# Patient Record
Sex: FEMALE | Race: WHITE | NOT HISPANIC OR LATINO
[De-identification: names, ages, dates, MRNs, and addresses within clinical notes are randomized per-mention and may not be internally consistent; named-entity substitution may affect disease eponyms.]

---

## 2017-01-09 ENCOUNTER — APPOINTMENT (OUTPATIENT)
Dept: CV DIAGNOSITCS | Facility: HOSPITAL | Age: 77
End: 2017-01-09

## 2017-01-10 ENCOUNTER — OUTPATIENT (OUTPATIENT)
Dept: OUTPATIENT SERVICES | Facility: HOSPITAL | Age: 77
LOS: 1 days | End: 2017-01-10
Payer: COMMERCIAL

## 2017-01-10 ENCOUNTER — APPOINTMENT (OUTPATIENT)
Dept: CV DIAGNOSITCS | Facility: HOSPITAL | Age: 77
End: 2017-01-10

## 2017-01-10 DIAGNOSIS — I05.9 RHEUMATIC MITRAL VALVE DISEASE, UNSPECIFIED: ICD-10-CM

## 2017-01-10 PROCEDURE — 93306 TTE W/DOPPLER COMPLETE: CPT | Mod: 26

## 2017-01-10 PROCEDURE — 93306 TTE W/DOPPLER COMPLETE: CPT

## 2017-01-10 PROCEDURE — 93312 ECHO TRANSESOPHAGEAL: CPT | Mod: 26

## 2017-01-10 PROCEDURE — 93312 ECHO TRANSESOPHAGEAL: CPT

## 2017-02-01 ENCOUNTER — RECORD ABSTRACTING (OUTPATIENT)
Age: 77
End: 2017-02-01

## 2017-02-01 DIAGNOSIS — Z86.79 PERSONAL HISTORY OF OTHER DISEASES OF THE CIRCULATORY SYSTEM: ICD-10-CM

## 2017-02-01 DIAGNOSIS — R53.83 OTHER FATIGUE: ICD-10-CM

## 2017-02-07 ENCOUNTER — APPOINTMENT (OUTPATIENT)
Dept: CARDIOTHORACIC SURGERY | Facility: CLINIC | Age: 77
End: 2017-02-07

## 2017-02-07 VITALS
BODY MASS INDEX: 21.66 KG/M2 | SYSTOLIC BLOOD PRESSURE: 146 MMHG | HEART RATE: 90 BPM | HEIGHT: 65 IN | DIASTOLIC BLOOD PRESSURE: 74 MMHG | RESPIRATION RATE: 16 BRPM | OXYGEN SATURATION: 98 % | WEIGHT: 130 LBS

## 2017-02-07 RX ORDER — FLUOCINONIDE 0.05 %
0.05 GEL (GRAM) TOPICAL
Refills: 0 | Status: DISCONTINUED | COMMUNITY
End: 2017-02-07

## 2017-07-07 ENCOUNTER — APPOINTMENT (OUTPATIENT)
Dept: CARDIOTHORACIC SURGERY | Facility: CLINIC | Age: 77
End: 2017-07-07

## 2017-07-07 VITALS
RESPIRATION RATE: 16 BRPM | OXYGEN SATURATION: 97 % | SYSTOLIC BLOOD PRESSURE: 134 MMHG | WEIGHT: 130 LBS | DIASTOLIC BLOOD PRESSURE: 76 MMHG | BODY MASS INDEX: 21.66 KG/M2 | HEIGHT: 65 IN | HEART RATE: 88 BPM

## 2017-07-20 ENCOUNTER — OUTPATIENT (OUTPATIENT)
Dept: OUTPATIENT SERVICES | Facility: HOSPITAL | Age: 77
LOS: 1 days | End: 2017-07-20
Payer: COMMERCIAL

## 2017-07-20 VITALS
TEMPERATURE: 98 F | HEIGHT: 65 IN | OXYGEN SATURATION: 100 % | DIASTOLIC BLOOD PRESSURE: 76 MMHG | WEIGHT: 111.99 LBS | HEART RATE: 81 BPM | SYSTOLIC BLOOD PRESSURE: 158 MMHG | RESPIRATION RATE: 16 BRPM

## 2017-07-20 DIAGNOSIS — I34.0 NONRHEUMATIC MITRAL (VALVE) INSUFFICIENCY: ICD-10-CM

## 2017-07-20 LAB
ALBUMIN SERPL ELPH-MCNC: 4.5 G/DL — SIGNIFICANT CHANGE UP (ref 3.3–5)
ALP SERPL-CCNC: 68 U/L — SIGNIFICANT CHANGE UP (ref 40–120)
ALT FLD-CCNC: 22 U/L RC — SIGNIFICANT CHANGE UP (ref 10–45)
ANION GAP SERPL CALC-SCNC: 12 MMOL/L — SIGNIFICANT CHANGE UP (ref 5–17)
AST SERPL-CCNC: 32 U/L — SIGNIFICANT CHANGE UP (ref 10–40)
BILIRUB SERPL-MCNC: 1.1 MG/DL — SIGNIFICANT CHANGE UP (ref 0.2–1.2)
BUN SERPL-MCNC: 22 MG/DL — SIGNIFICANT CHANGE UP (ref 7–23)
CALCIUM SERPL-MCNC: 10 MG/DL — SIGNIFICANT CHANGE UP (ref 8.4–10.5)
CHLORIDE SERPL-SCNC: 101 MMOL/L — SIGNIFICANT CHANGE UP (ref 96–108)
CO2 SERPL-SCNC: 27 MMOL/L — SIGNIFICANT CHANGE UP (ref 22–31)
CREAT SERPL-MCNC: 1.03 MG/DL — SIGNIFICANT CHANGE UP (ref 0.5–1.3)
GLUCOSE SERPL-MCNC: 106 MG/DL — HIGH (ref 70–99)
HCT VFR BLD CALC: 44.2 % — SIGNIFICANT CHANGE UP (ref 34.5–45)
HGB BLD-MCNC: 14.7 G/DL — SIGNIFICANT CHANGE UP (ref 11.5–15.5)
MCHC RBC-ENTMCNC: 30 PG — SIGNIFICANT CHANGE UP (ref 27–34)
MCHC RBC-ENTMCNC: 33.2 GM/DL — SIGNIFICANT CHANGE UP (ref 32–36)
MCV RBC AUTO: 90.4 FL — SIGNIFICANT CHANGE UP (ref 80–100)
PLATELET # BLD AUTO: 163 K/UL — SIGNIFICANT CHANGE UP (ref 150–400)
POTASSIUM SERPL-MCNC: 4 MMOL/L — SIGNIFICANT CHANGE UP (ref 3.5–5.3)
POTASSIUM SERPL-SCNC: 4 MMOL/L — SIGNIFICANT CHANGE UP (ref 3.5–5.3)
PROT SERPL-MCNC: 7.9 G/DL — SIGNIFICANT CHANGE UP (ref 6–8.3)
RBC # BLD: 4.89 M/UL — SIGNIFICANT CHANGE UP (ref 3.8–5.2)
RBC # FLD: 12.6 % — SIGNIFICANT CHANGE UP (ref 10.3–14.5)
SODIUM SERPL-SCNC: 140 MMOL/L — SIGNIFICANT CHANGE UP (ref 135–145)
WBC # BLD: 9.6 K/UL — SIGNIFICANT CHANGE UP (ref 3.8–10.5)
WBC # FLD AUTO: 9.6 K/UL — SIGNIFICANT CHANGE UP (ref 3.8–10.5)

## 2017-07-20 PROCEDURE — 93460 R&L HRT ART/VENTRICLE ANGIO: CPT

## 2017-07-20 PROCEDURE — C1887: CPT

## 2017-07-20 PROCEDURE — C1769: CPT

## 2017-07-20 PROCEDURE — C1817: CPT

## 2017-07-20 PROCEDURE — 93460 R&L HRT ART/VENTRICLE ANGIO: CPT | Mod: 26,GC

## 2017-07-20 PROCEDURE — 93010 ELECTROCARDIOGRAM REPORT: CPT

## 2017-07-20 PROCEDURE — 80053 COMPREHEN METABOLIC PANEL: CPT

## 2017-07-20 PROCEDURE — 85027 COMPLETE CBC AUTOMATED: CPT

## 2017-07-20 PROCEDURE — C1894: CPT

## 2017-07-20 PROCEDURE — 93005 ELECTROCARDIOGRAM TRACING: CPT

## 2017-07-20 NOTE — H&P CARDIOLOGY - HISTORY OF PRESENT ILLNESS
75 y/o female with HTN, AS diagnostic cath 2013 revealed CHIQUITA .94 with normal coronaries (not a candidate at that time for surgery) and mild to moderate MR with Rheumatic fever as a child s/p MVR (bovine) 2007 with Dr. Kinney, CKD (unknown stage), pHTN, skin cancer seen and evaluated Dr. Amber Guerrero for shortness of breath with fatigue gradually over the last 6 months to a year.  Pt s/p ANITA revealing there is left ventricular outflow obstruction that maybe partially related to the strut of the bioprosthetic mitral valve, however, more likely is secondary to a subaortic membrane. A 0.3 cm echodensity is seen protruding into the LVOT from the basal septum consistent with a subaortic membrane. The presence of a subaortic membrane would explain the elevated velocity (and gradient) seen in presence of an aortic valve that is not severely stenotic.  Pt referred to Dr. Nirmal CTS for consult and presents today for cardiac cath R & L for further evaluation.  Pt denies symptoms at rest.        < from: Cardiac Cath Lab (10.04.13 @ 16:41) >  VENTRICLES: There were no left ventricular global or regional wall motion  abnormalities. EF calculated by contrast ventriculography was 70 %.  VALVES: AORTIC VALVE: The aortic valve was evaluated by hemodynamic  measurement, left ventriculography, and aortography. The aortic annulus  was normal sized. The aortic valve appeared to be tricuspid. The aortic  valve leaflets exhibited moderate calcification. There was moderate to  severe aortic stenosis. There was trivial (less than 1+) aortic  insufficiency. MITRAL VALVE: The mitral valve was evaluated by hemodynamic0  measurements and left ventriculography. There was mild mitral stenosis.  The mitral valve exhibited mild regurgitation. A bioprosthetic valve was  observed in the mitral position.  CORONARY VESSELS: The coronary circulation is co-dominant.  LM:   --  LM: Normal.  LAD:   --  LAD: Normal.  CX:   --  Circumflex: Normal.  RCA:   --  RCA: Normal.  AORTA: The thoracic aorta appeared normal. The root exhibited normal size.  COMPLICATIONS: There were no complications.  SUMMARY:  HEMODYNAMICS: Hemodynamic assessment demonstrates moderate systemic  hypertension.  DIAGNOSTIC IMPRESSIONS: 72 year old female with a history of Mitral Valve  disease and bioprosthetic MVR who presents for cardiac catheterization due  to chest pain and Aortic Valve stenosis on echocardiogram. Right and left  heart catheterization are performed today, along with Coronary  angiography. Coronary angiography revealed normal vessels with no  signifcant CAD. Right heard catheterization revealed normal Pulmonary  Artery pressures. There is mild to moderate Mitral Valve stenosis, which  is probably appropriate for the bioprosthetic valve. There is moderate to  severe Aortic Stenosis with an Aortic Valve Area of 0.94 square cm. There  is mild Aortic Insufficiency. LV function is preserved with LVEF = 70%.  The patient is not a candidate for Aortic Valve Replacement and does not  require Coronary Revascularization. She had moderate Hypertension with a  25 mmHg discrepancy between the cuff pressure and central Aortic pressure.  The patient will require further titration of antihypertensive medications  to a target systolic BP of 100 mmHg. She will be followed closely as an  outpatient.    < end of copied text >    < from: Transesophageal Echocardiogram (01.10.17 @ 13:02) >  Conclusions:  1. Bioprosthetic mitral valve replacement. Mild-moderate  mitral regurgitation. Peak mitral valve gradient equals 18  mm Hg, mean transmitral valve gradient equals 7 mm Hg (at  HR 70-80 bpm), which is elevated even in the setting of a  bioprosthetic mitral valve replacement.  2. Calcified trileaflet aortic valve with decreased  opening. Peak transaortic valve gradient equals 52 mm Hg,  mean transaortic valve gradient equals 31 mm Hg. 2D  planimetered aortic valve area is 1.8 cm2 consistent with  mild aortic stenosis.  Mild-moderate aortic regurgitation.  3. Peak left ventricular outflow tract gradient equals 10  mm Hg, mean gradient is equal to 5 mm Hg. There is left  ventricular outflow obstruction that maybe partially  related to the strut of the bioprosthetic mitral valve,  however, more likely is secondary to a subaortic membrane.  A 0.3 cm echodensity is seen protruding into the LVOT from  the basal septum consistent with a subaortic membrane. The  presence of a subaortic membrane would explain the elevated  velocity (and gradient) seen in presence of an aortic valve  that is not severely stenotic.    < end of copied text >

## 2017-07-20 NOTE — H&P CARDIOLOGY - PMH
Aortic stenosis    Fibroid    HTN (hypertension), benign    Mitral valve disease    Ovarian cyst    Rheumatic fever  at 11 years of age

## 2017-07-24 ENCOUNTER — RECORD ABSTRACTING (OUTPATIENT)
Age: 77
End: 2017-07-24

## 2017-08-09 ENCOUNTER — TRANSCRIPTION ENCOUNTER (OUTPATIENT)
Age: 77
End: 2017-08-09

## 2017-08-10 ENCOUNTER — OUTPATIENT (OUTPATIENT)
Dept: OUTPATIENT SERVICES | Facility: HOSPITAL | Age: 77
LOS: 1 days | End: 2017-08-10
Payer: COMMERCIAL

## 2017-08-10 ENCOUNTER — APPOINTMENT (OUTPATIENT)
Dept: PULMONOLOGY | Facility: CLINIC | Age: 77
End: 2017-08-10
Payer: COMMERCIAL

## 2017-08-10 VITALS
DIASTOLIC BLOOD PRESSURE: 70 MMHG | HEART RATE: 80 BPM | TEMPERATURE: 97 F | HEIGHT: 64 IN | WEIGHT: 114.64 LBS | SYSTOLIC BLOOD PRESSURE: 128 MMHG | RESPIRATION RATE: 16 BRPM

## 2017-08-10 DIAGNOSIS — Z01.818 ENCOUNTER FOR OTHER PREPROCEDURAL EXAMINATION: ICD-10-CM

## 2017-08-10 DIAGNOSIS — R06.02 SHORTNESS OF BREATH: ICD-10-CM

## 2017-08-10 DIAGNOSIS — I42.2 OTHER HYPERTROPHIC CARDIOMYOPATHY: ICD-10-CM

## 2017-08-10 DIAGNOSIS — C44.91 BASAL CELL CARCINOMA OF SKIN, UNSPECIFIED: Chronic | ICD-10-CM

## 2017-08-10 LAB
ALBUMIN SERPL ELPH-MCNC: 4.5 G/DL — SIGNIFICANT CHANGE UP (ref 3.3–5.2)
ALP SERPL-CCNC: 66 U/L — SIGNIFICANT CHANGE UP (ref 40–120)
ALT FLD-CCNC: 20 U/L — SIGNIFICANT CHANGE UP
ANION GAP SERPL CALC-SCNC: 15 MMOL/L — SIGNIFICANT CHANGE UP (ref 5–17)
APPEARANCE UR: CLEAR — SIGNIFICANT CHANGE UP
APTT BLD: 28.4 SEC — SIGNIFICANT CHANGE UP (ref 27.5–37.4)
AST SERPL-CCNC: 27 U/L — SIGNIFICANT CHANGE UP
BILIRUB SERPL-MCNC: 1 MG/DL — SIGNIFICANT CHANGE UP (ref 0.4–2)
BILIRUB UR-MCNC: NEGATIVE — SIGNIFICANT CHANGE UP
BLD GP AB SCN SERPL QL: SIGNIFICANT CHANGE UP
BUN SERPL-MCNC: 18 MG/DL — SIGNIFICANT CHANGE UP (ref 8–20)
CALCIUM SERPL-MCNC: 9.8 MG/DL — SIGNIFICANT CHANGE UP (ref 8.6–10.2)
CHLORIDE SERPL-SCNC: 97 MMOL/L — LOW (ref 98–107)
CO2 SERPL-SCNC: 26 MMOL/L — SIGNIFICANT CHANGE UP (ref 22–29)
COLOR SPEC: YELLOW — SIGNIFICANT CHANGE UP
CREAT SERPL-MCNC: 0.83 MG/DL — SIGNIFICANT CHANGE UP (ref 0.5–1.3)
DIFF PNL FLD: ABNORMAL
EPI CELLS # UR: SIGNIFICANT CHANGE UP
GLUCOSE SERPL-MCNC: 105 MG/DL — SIGNIFICANT CHANGE UP (ref 70–115)
GLUCOSE UR QL: NEGATIVE MG/DL — SIGNIFICANT CHANGE UP
HBA1C BLD-MCNC: 5.2 % — SIGNIFICANT CHANGE UP (ref 4–5.6)
HCT VFR BLD CALC: 42 % — SIGNIFICANT CHANGE UP (ref 37–47)
HGB BLD-MCNC: 13.8 G/DL — SIGNIFICANT CHANGE UP (ref 12–16)
INR BLD: 1.03 RATIO — SIGNIFICANT CHANGE UP (ref 0.88–1.16)
KETONES UR-MCNC: NEGATIVE — SIGNIFICANT CHANGE UP
LEUKOCYTE ESTERASE UR-ACNC: ABNORMAL
MCHC RBC-ENTMCNC: 28.7 PG — SIGNIFICANT CHANGE UP (ref 27–31)
MCHC RBC-ENTMCNC: 32.9 G/DL — SIGNIFICANT CHANGE UP (ref 32–36)
MCV RBC AUTO: 87.3 FL — SIGNIFICANT CHANGE UP (ref 81–99)
MRSA PCR RESULT.: SIGNIFICANT CHANGE UP
NITRITE UR-MCNC: NEGATIVE — SIGNIFICANT CHANGE UP
NT-PROBNP SERPL-SCNC: 1160 PG/ML — HIGH (ref 0–300)
PH UR: 7 — SIGNIFICANT CHANGE UP (ref 5–8)
PLATELET # BLD AUTO: 243 K/UL — SIGNIFICANT CHANGE UP (ref 150–400)
POTASSIUM SERPL-MCNC: 4.8 MMOL/L — SIGNIFICANT CHANGE UP (ref 3.5–5.3)
POTASSIUM SERPL-SCNC: 4.8 MMOL/L — SIGNIFICANT CHANGE UP (ref 3.5–5.3)
PREALB SERPL-MCNC: 23 MG/DL — SIGNIFICANT CHANGE UP (ref 18–38)
PROT SERPL-MCNC: 7.8 G/DL — SIGNIFICANT CHANGE UP (ref 6.6–8.7)
PROT UR-MCNC: NEGATIVE MG/DL — SIGNIFICANT CHANGE UP
PROTHROM AB SERPL-ACNC: 11.3 SEC — SIGNIFICANT CHANGE UP (ref 9.8–12.7)
RBC # BLD: 4.81 M/UL — SIGNIFICANT CHANGE UP (ref 4.4–5.2)
RBC # FLD: 14.4 % — SIGNIFICANT CHANGE UP (ref 11–15.6)
S AUREUS DNA NOSE QL NAA+PROBE: SIGNIFICANT CHANGE UP
SODIUM SERPL-SCNC: 138 MMOL/L — SIGNIFICANT CHANGE UP (ref 135–145)
SP GR SPEC: 1 — LOW (ref 1.01–1.02)
T3 SERPL-MCNC: 102 NG/DL — SIGNIFICANT CHANGE UP (ref 80–200)
T4 AB SER-ACNC: 7.6 UG/DL — SIGNIFICANT CHANGE UP (ref 4.5–12)
TSH SERPL-MCNC: 0.98 UIU/ML — SIGNIFICANT CHANGE UP (ref 0.27–4.2)
TYPE + AB SCN PNL BLD: SIGNIFICANT CHANGE UP
UROBILINOGEN FLD QL: NEGATIVE MG/DL — SIGNIFICANT CHANGE UP
WBC # BLD: 9 K/UL — SIGNIFICANT CHANGE UP (ref 4.8–10.8)
WBC # FLD AUTO: 9 K/UL — SIGNIFICANT CHANGE UP (ref 4.8–10.8)
WBC UR QL: SIGNIFICANT CHANGE UP

## 2017-08-10 PROCEDURE — 93306 TTE W/DOPPLER COMPLETE: CPT

## 2017-08-10 PROCEDURE — 94727 GAS DIL/WSHOT DETER LNG VOL: CPT

## 2017-08-10 PROCEDURE — 94664 DEMO&/EVAL PT USE INHALER: CPT | Mod: 59

## 2017-08-10 PROCEDURE — 93880 EXTRACRANIAL BILAT STUDY: CPT | Mod: 26

## 2017-08-10 PROCEDURE — 94060 EVALUATION OF WHEEZING: CPT

## 2017-08-10 PROCEDURE — 85018 HEMOGLOBIN: CPT | Mod: QW

## 2017-08-10 PROCEDURE — 71020: CPT | Mod: 26

## 2017-08-10 PROCEDURE — 93880 EXTRACRANIAL BILAT STUDY: CPT

## 2017-08-10 PROCEDURE — 93010 ELECTROCARDIOGRAM REPORT: CPT

## 2017-08-10 PROCEDURE — 94729 DIFFUSING CAPACITY: CPT

## 2017-08-10 PROCEDURE — 93306 TTE W/DOPPLER COMPLETE: CPT | Mod: 26

## 2017-08-10 RX ORDER — CEFUROXIME AXETIL 250 MG
1500 TABLET ORAL ONCE
Qty: 0 | Refills: 0 | Status: DISCONTINUED | OUTPATIENT
Start: 2017-08-24 | End: 2017-08-24

## 2017-08-10 NOTE — H&P PST ADULT - NEGATIVE BREAST SYMPTOMS
no breast lump L/no breast tenderness L/no nipple discharge L/no breast lump R/no breast tenderness R/no nipple discharge R

## 2017-08-10 NOTE — H&P PST ADULT - PMH
A-fib  2007 coverted 1/2008  Aortic stenosis    BCC (basal cell carcinoma of skin)    Fibroid    HTN (hypertension), benign    Mitral valve disease    Ovarian cyst    Rheumatic fever  at 11 years of age

## 2017-08-10 NOTE — H&P PST ADULT - NEGATIVE FEMALE-SPECIFIC SYMPTOMS
no spotting/no irregular menses/no amenorrhea/no vaginal discharge/no menorrhagia/no abnormal vaginal bleeding/no pelvic pain/not applicable/no dysmenorrhea

## 2017-08-10 NOTE — H&P PST ADULT - RS GEN PE MLT RESP DETAILS PC
no wheezes/no rales/breath sounds equal/airway patent/no chest wall tenderness/good air movement/clear to auscultation bilaterally/no intercostal retractions/normal/respirations non-labored/no rhonchi/no subcutaneous emphysema

## 2017-08-10 NOTE — H&P PST ADULT - NEGATIVE ENMT SYMPTOMS
no nasal obstruction/no post-nasal discharge/no sinus symptoms/no hearing difficulty/no recurrent cold sores/no gum bleeding/no abnormal taste sensation/no throat pain/no dry mouth/no nose bleeds/no tinnitus/no nasal congestion/no ear pain/no dysphagia/no vertigo/no nasal discharge

## 2017-08-10 NOTE — H&P PST ADULT - NEGATIVE SKIN SYMPTOMS
no itching/no change in size/color of mole/no pitted nails/no hair loss/no tumor/no brittle nails/no dryness/no rash

## 2017-08-10 NOTE — H&P PST ADULT - NEGATIVE MUSCULOSKELETAL SYMPTOMS
no joint swelling/no myalgia/no back pain/no leg pain L/no arm pain L/no arm pain R/no leg pain R/no stiffness/no arthralgia/no muscle cramps/no muscle weakness/no arthritis/no neck pain

## 2017-08-10 NOTE — H&P PST ADULT - NEGATIVE GENERAL GENITOURINARY SYMPTOMS
normal urinary frequency/no flank pain R/no dysuria/no urinary hesitancy/normal libido/no renal colic/no urine discoloration/no hematuria/no flank pain L/no incontinence/no gas in urine/no bladder infections

## 2017-08-10 NOTE — H&P PST ADULT - NEGATIVE GASTROINTESTINAL SYMPTOMS
no abdominal pain/no steatorrhea/no constipation/no hematochezia/no hiccoughs/no diarrhea/no jaundice/no change in bowel habits/no nausea/no melena/no vomiting/no flatulence

## 2017-08-10 NOTE — H&P PST ADULT - NEGATIVE NEUROLOGICAL SYMPTOMS
no paresthesias/no focal seizures/no transient paralysis/no weakness/no headache/no confusion/no vertigo/no syncope/no generalized seizures/no tremors/no difficulty walking/no loss of sensation/no loss of consciousness/no hemiparesis/no facial palsy

## 2017-08-10 NOTE — H&P PST ADULT - GASTROINTESTINAL DETAILS
nontender/no guarding/no masses palpable/bowel sounds normal/normal/soft/no rigidity/no bruit/no organomegaly/no distention/no rebound tenderness

## 2017-08-10 NOTE — H&P PST ADULT - NEGATIVE OPHTHALMOLOGIC SYMPTOMS
no loss of vision R/no pain L/no discharge L/no pain R/no irritation L/no lacrimation L/no diplopia/no photophobia/no blurred vision L/no blurred vision R/no scleral injection L/no scleral injection R/no lacrimation R/no discharge R/no irritation R/no loss of vision L

## 2017-08-10 NOTE — H&P PST ADULT - NEUROLOGICAL DETAILS
sensation intact/deep reflexes intact/alert and oriented x 3/normal strength/cranial nerves intact/superficial reflexes intact/responds to pain/responds to verbal commands/no spontaneous movement

## 2017-08-10 NOTE — H&P PST ADULT - HISTORY OF PRESENT ILLNESS
77 y/o female with CAD and previous mitral valve replacement in 2007 now has new on set SOBOE and fatigue , angiogram showed sever subvalvular stenosis and hypertrophied septum patient now presents for reoperative sternotomy septal myomectomy possible mitral valve repair

## 2017-08-10 NOTE — PATIENT PROFILE ADULT. - PSH
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

## 2017-08-10 NOTE — H&P PST ADULT - NEGATIVE GENERAL SYMPTOMS
no fatigue/no polydipsia/no polyphagia/no polyuria/no malaise/no chills/no weight gain/no weight loss/no fever/no sweating/no anorexia

## 2017-08-10 NOTE — H&P PST ADULT - NEGATIVE CARDIOVASCULAR SYMPTOMS
no claudication/no orthopnea/no dyspnea on exertion/no chest pain/no paroxysmal nocturnal dyspnea/no peripheral edema

## 2017-08-10 NOTE — H&P PST ADULT - NSANTHOSAYNRD_GEN_A_CORE
No. STACY screening performed.  STOP BANG Legend: 0-2 = LOW Risk; 3-4 = INTERMEDIATE Risk; 5-8 = HIGH Risk

## 2017-08-10 NOTE — H&P PST ADULT - NEGATIVE MALE-SPECIFIC SYMPTOMS
Normal vision: sees adequately in most situations; can see medication labels, newsprint not applicable

## 2017-08-10 NOTE — H&P PST ADULT - MUSCULOSKELETAL
details… detailed exam ROM intact/no joint swelling/no calf tenderness/no joint warmth/normal strength/no joint erythema/normal

## 2017-08-10 NOTE — H&P PST ADULT - NEGATIVE PSYCHIATRIC SYMPTOMS
no suicidal ideation/no agitation/no memory loss/no mood swings/no visual hallucinations/no insomnia/no paranoia/no hyperactivity/no anxiety/no auditory hallucinations/no depression

## 2017-08-11 LAB
CULTURE RESULTS: SIGNIFICANT CHANGE UP
SPECIMEN SOURCE: SIGNIFICANT CHANGE UP

## 2017-08-24 ENCOUNTER — APPOINTMENT (OUTPATIENT)
Dept: THORACIC SURGERY | Facility: HOSPITAL | Age: 77
End: 2017-08-24

## 2017-08-24 ENCOUNTER — APPOINTMENT (OUTPATIENT)
Dept: CARDIOTHORACIC SURGERY | Facility: HOSPITAL | Age: 77
End: 2017-08-24
Payer: COMMERCIAL

## 2017-08-24 ENCOUNTER — RESULT REVIEW (OUTPATIENT)
Age: 77
End: 2017-08-24

## 2017-08-24 ENCOUNTER — INPATIENT (INPATIENT)
Facility: HOSPITAL | Age: 77
LOS: 8 days | Discharge: HOME | DRG: 220 | End: 2017-09-02
Attending: THORACIC SURGERY (CARDIOTHORACIC VASCULAR SURGERY) | Admitting: THORACIC SURGERY (CARDIOTHORACIC VASCULAR SURGERY)
Payer: COMMERCIAL

## 2017-08-24 VITALS
SYSTOLIC BLOOD PRESSURE: 152 MMHG | DIASTOLIC BLOOD PRESSURE: 64 MMHG | HEART RATE: 74 BPM | WEIGHT: 114.64 LBS | OXYGEN SATURATION: 98 % | TEMPERATURE: 98 F | HEIGHT: 64 IN | RESPIRATION RATE: 16 BRPM

## 2017-08-24 DIAGNOSIS — I42.2 OTHER HYPERTROPHIC CARDIOMYOPATHY: ICD-10-CM

## 2017-08-24 DIAGNOSIS — C44.91 BASAL CELL CARCINOMA OF SKIN, UNSPECIFIED: Chronic | ICD-10-CM

## 2017-08-24 LAB
ABO RH CONFIRMATION: SIGNIFICANT CHANGE UP
ALBUMIN SERPL ELPH-MCNC: 2.9 G/DL — LOW (ref 3.3–5.2)
ALP SERPL-CCNC: 43 U/L — SIGNIFICANT CHANGE UP (ref 40–120)
ALT FLD-CCNC: 23 U/L — SIGNIFICANT CHANGE UP
ANION GAP SERPL CALC-SCNC: 15 MMOL/L — SIGNIFICANT CHANGE UP (ref 5–17)
APTT BLD: 37.8 SEC — HIGH (ref 27.5–37.4)
AST SERPL-CCNC: 67 U/L — HIGH
BASE EXCESS BLDV CALC-SCNC: -5.7 MMOL/L — LOW (ref -3–3)
BILIRUB SERPL-MCNC: 1.9 MG/DL — SIGNIFICANT CHANGE UP (ref 0.4–2)
BUN SERPL-MCNC: 11 MG/DL — SIGNIFICANT CHANGE UP (ref 8–20)
CA-I SERPL-SCNC: 1.11 MMOL/L — LOW (ref 1.12–1.3)
CALCIUM SERPL-MCNC: 7.7 MG/DL — LOW (ref 8.6–10.2)
CHLORIDE BLDV-SCNC: 116 MMOL/L — HIGH (ref 98–106)
CHLORIDE SERPL-SCNC: 109 MMOL/L — HIGH (ref 98–107)
CK MB CFR SERPL CALC: 65 NG/ML — HIGH (ref 0–6.7)
CK SERPL-CCNC: 446 U/L — HIGH (ref 25–170)
CO2 SERPL-SCNC: 21 MMOL/L — LOW (ref 22–29)
CREAT SERPL-MCNC: 0.55 MG/DL — SIGNIFICANT CHANGE UP (ref 0.5–1.3)
GAS PNL BLDA: SIGNIFICANT CHANGE UP
GAS PNL BLDV: 142 MMOL/L — SIGNIFICANT CHANGE UP (ref 136–146)
GAS PNL BLDV: SIGNIFICANT CHANGE UP
GAS PNL BLDV: SIGNIFICANT CHANGE UP
GLUCOSE BLDV-MCNC: 110 MG/DL — HIGH (ref 70–99)
GLUCOSE SERPL-MCNC: 113 MG/DL — SIGNIFICANT CHANGE UP (ref 70–115)
HCO3 BLDV-SCNC: 19 MMOL/L — LOW (ref 20–26)
HCT VFR BLD CALC: 39.8 % — SIGNIFICANT CHANGE UP (ref 37–47)
HCT VFR BLDA CALC: 34 — LOW (ref 39–50)
HGB BLD CALC-MCNC: 10.9 G/DL — LOW (ref 11.5–15.5)
HGB BLD-MCNC: 13.5 G/DL — SIGNIFICANT CHANGE UP (ref 12–16)
INR BLD: 1.32 RATIO — HIGH (ref 0.88–1.16)
LACTATE BLDV-MCNC: 2.7 MMOL/L — HIGH (ref 0.5–2)
MAGNESIUM SERPL-MCNC: 2.9 MG/DL — HIGH (ref 1.6–2.6)
MCHC RBC-ENTMCNC: 28.7 PG — SIGNIFICANT CHANGE UP (ref 27–31)
MCHC RBC-ENTMCNC: 33.9 G/DL — SIGNIFICANT CHANGE UP (ref 32–36)
MCV RBC AUTO: 84.7 FL — SIGNIFICANT CHANGE UP (ref 81–99)
OTHER CELLS CSF MANUAL: 12 ML/DL — LOW (ref 18–22)
PCO2 BLDV: 50 MMHG — SIGNIFICANT CHANGE UP (ref 35–50)
PH BLDV: 7.25 — LOW (ref 7.35–7.45)
PLATELET # BLD AUTO: 102 K/UL — LOW (ref 150–400)
PO2 BLDV: 43 MMHG — SIGNIFICANT CHANGE UP (ref 25–45)
POTASSIUM BLDV-SCNC: 4.2 MMOL/L — SIGNIFICANT CHANGE UP (ref 3.4–4.5)
POTASSIUM SERPL-MCNC: 3.6 MMOL/L — SIGNIFICANT CHANGE UP (ref 3.5–5.3)
POTASSIUM SERPL-SCNC: 3.6 MMOL/L — SIGNIFICANT CHANGE UP (ref 3.5–5.3)
PROT SERPL-MCNC: 4.8 G/DL — LOW (ref 6.6–8.7)
PROTHROM AB SERPL-ACNC: 14.6 SEC — HIGH (ref 9.8–12.7)
RBC # BLD: 4.7 M/UL — SIGNIFICANT CHANGE UP (ref 4.4–5.2)
RBC # FLD: 14.2 % — SIGNIFICANT CHANGE UP (ref 11–15.6)
SAO2 % BLDV: 78 % — SIGNIFICANT CHANGE UP (ref 67–88)
SODIUM SERPL-SCNC: 145 MMOL/L — SIGNIFICANT CHANGE UP (ref 135–145)
TROPONIN T SERPL-MCNC: 2.32 NG/ML — HIGH (ref 0–0.06)
WBC # BLD: 16.6 K/UL — HIGH (ref 4.8–10.8)
WBC # FLD AUTO: 16.6 K/UL — HIGH (ref 4.8–10.8)

## 2017-08-24 PROCEDURE — 93010 ELECTROCARDIOGRAM REPORT: CPT

## 2017-08-24 PROCEDURE — 85730 THROMBOPLASTIN TIME PARTIAL: CPT

## 2017-08-24 PROCEDURE — 84443 ASSAY THYROID STIM HORMONE: CPT

## 2017-08-24 PROCEDURE — G0463: CPT

## 2017-08-24 PROCEDURE — 88300 SURGICAL PATH GROSS: CPT | Mod: 26,59

## 2017-08-24 PROCEDURE — 87086 URINE CULTURE/COLONY COUNT: CPT

## 2017-08-24 PROCEDURE — 84134 ASSAY OF PREALBUMIN: CPT

## 2017-08-24 PROCEDURE — 86920 COMPATIBILITY TEST SPIN: CPT

## 2017-08-24 PROCEDURE — 93005 ELECTROCARDIOGRAM TRACING: CPT

## 2017-08-24 PROCEDURE — 33530 CORONARY ARTERY BYPASS/REOP: CPT

## 2017-08-24 PROCEDURE — 88307 TISSUE EXAM BY PATHOLOGIST: CPT | Mod: 26

## 2017-08-24 PROCEDURE — 83880 ASSAY OF NATRIURETIC PEPTIDE: CPT

## 2017-08-24 PROCEDURE — 87641 MR-STAPH DNA AMP PROBE: CPT

## 2017-08-24 PROCEDURE — 86901 BLOOD TYPING SEROLOGIC RH(D): CPT

## 2017-08-24 PROCEDURE — 85027 COMPLETE CBC AUTOMATED: CPT

## 2017-08-24 PROCEDURE — 83036 HEMOGLOBIN GLYCOSYLATED A1C: CPT

## 2017-08-24 PROCEDURE — 88305 TISSUE EXAM BY PATHOLOGIST: CPT | Mod: 26

## 2017-08-24 PROCEDURE — 85610 PROTHROMBIN TIME: CPT

## 2017-08-24 PROCEDURE — 84480 ASSAY TRIIODOTHYRONINE (T3): CPT

## 2017-08-24 PROCEDURE — 80053 COMPREHEN METABOLIC PANEL: CPT

## 2017-08-24 PROCEDURE — 71046 X-RAY EXAM CHEST 2 VIEWS: CPT

## 2017-08-24 PROCEDURE — 33430 REPLACEMENT OF MITRAL VALVE: CPT

## 2017-08-24 PROCEDURE — 84436 ASSAY OF TOTAL THYROXINE: CPT

## 2017-08-24 PROCEDURE — 86900 BLOOD TYPING SEROLOGIC ABO: CPT

## 2017-08-24 PROCEDURE — 86850 RBC ANTIBODY SCREEN: CPT

## 2017-08-24 PROCEDURE — 36415 COLL VENOUS BLD VENIPUNCTURE: CPT

## 2017-08-24 PROCEDURE — 71010: CPT | Mod: 26

## 2017-08-24 PROCEDURE — 33530 CORONARY ARTERY BYPASS/REOP: CPT | Mod: AS

## 2017-08-24 PROCEDURE — 81001 URINALYSIS AUTO W/SCOPE: CPT

## 2017-08-24 PROCEDURE — 87640 STAPH A DNA AMP PROBE: CPT

## 2017-08-24 PROCEDURE — 33430 REPLACEMENT OF MITRAL VALVE: CPT | Mod: AS

## 2017-08-24 RX ORDER — SODIUM CHLORIDE 9 MG/ML
500 INJECTION, SOLUTION INTRAVENOUS ONCE
Qty: 0 | Refills: 0 | Status: COMPLETED | OUTPATIENT
Start: 2017-08-24 | End: 2017-08-24

## 2017-08-24 RX ORDER — VANCOMYCIN HCL 1 G
1000 VIAL (EA) INTRAVENOUS EVERY 12 HOURS
Qty: 0 | Refills: 0 | Status: COMPLETED | OUTPATIENT
Start: 2017-08-24 | End: 2017-08-26

## 2017-08-24 RX ORDER — VASOPRESSIN 20 [USP'U]/ML
0.02 INJECTION INTRAVENOUS
Qty: 100 | Refills: 0 | Status: DISCONTINUED | OUTPATIENT
Start: 2017-08-24 | End: 2017-08-26

## 2017-08-24 RX ORDER — INSULIN HUMAN 100 [IU]/ML
1.5 INJECTION, SOLUTION SUBCUTANEOUS ONCE
Qty: 0 | Refills: 0 | Status: COMPLETED | OUTPATIENT
Start: 2017-08-24 | End: 2017-08-24

## 2017-08-24 RX ORDER — ACETAMINOPHEN 500 MG
1000 TABLET ORAL ONCE
Qty: 0 | Refills: 0 | Status: COMPLETED | OUTPATIENT
Start: 2017-08-24 | End: 2017-08-25

## 2017-08-24 RX ORDER — ALBUMIN HUMAN 25 %
250 VIAL (ML) INTRAVENOUS ONCE
Qty: 0 | Refills: 0 | Status: COMPLETED | OUTPATIENT
Start: 2017-08-24 | End: 2017-08-24

## 2017-08-24 RX ORDER — ALBUMIN HUMAN 25 %
250 VIAL (ML) INTRAVENOUS
Qty: 0 | Refills: 0 | Status: COMPLETED | OUTPATIENT
Start: 2017-08-24 | End: 2017-08-24

## 2017-08-24 RX ORDER — SODIUM CHLORIDE 9 MG/ML
1000 INJECTION INTRAMUSCULAR; INTRAVENOUS; SUBCUTANEOUS
Qty: 0 | Refills: 0 | Status: DISCONTINUED | OUTPATIENT
Start: 2017-08-24 | End: 2017-08-28

## 2017-08-24 RX ORDER — NOREPINEPHRINE BITARTRATE/D5W 8 MG/250ML
0.16 PLASTIC BAG, INJECTION (ML) INTRAVENOUS
Qty: 8 | Refills: 0 | Status: DISCONTINUED | OUTPATIENT
Start: 2017-08-24 | End: 2017-08-26

## 2017-08-24 RX ORDER — SODIUM CHLORIDE 9 MG/ML
1000 INJECTION, SOLUTION INTRAVENOUS ONCE
Qty: 0 | Refills: 0 | Status: COMPLETED | OUTPATIENT
Start: 2017-08-24 | End: 2017-08-24

## 2017-08-24 RX ORDER — SODIUM CHLORIDE 9 MG/ML
3 INJECTION INTRAMUSCULAR; INTRAVENOUS; SUBCUTANEOUS EVERY 8 HOURS
Qty: 0 | Refills: 0 | Status: DISCONTINUED | OUTPATIENT
Start: 2017-08-24 | End: 2017-08-24

## 2017-08-24 RX ORDER — POTASSIUM CHLORIDE 20 MEQ
10 PACKET (EA) ORAL
Qty: 0 | Refills: 0 | Status: DISCONTINUED | OUTPATIENT
Start: 2017-08-24 | End: 2017-08-25

## 2017-08-24 RX ORDER — SODIUM BICARBONATE 1 MEQ/ML
50 SYRINGE (ML) INTRAVENOUS ONCE
Qty: 0 | Refills: 0 | Status: COMPLETED | OUTPATIENT
Start: 2017-08-24 | End: 2017-08-24

## 2017-08-24 RX ORDER — CHLORHEXIDINE GLUCONATE 213 G/1000ML
15 SOLUTION TOPICAL
Qty: 0 | Refills: 0 | Status: DISCONTINUED | OUTPATIENT
Start: 2017-08-24 | End: 2017-08-25

## 2017-08-24 RX ORDER — ONDANSETRON 8 MG/1
4 TABLET, FILM COATED ORAL EVERY 6 HOURS
Qty: 0 | Refills: 0 | Status: COMPLETED | OUTPATIENT
Start: 2017-08-24 | End: 2017-08-25

## 2017-08-24 RX ORDER — PANTOPRAZOLE SODIUM 20 MG/1
40 TABLET, DELAYED RELEASE ORAL ONCE
Qty: 0 | Refills: 0 | Status: COMPLETED | OUTPATIENT
Start: 2017-08-24 | End: 2017-08-24

## 2017-08-24 RX ORDER — POTASSIUM CHLORIDE 20 MEQ
10 PACKET (EA) ORAL ONCE
Qty: 0 | Refills: 0 | Status: DISCONTINUED | OUTPATIENT
Start: 2017-08-24 | End: 2017-08-25

## 2017-08-24 RX ORDER — POTASSIUM CHLORIDE 20 MEQ
20 PACKET (EA) ORAL
Qty: 0 | Refills: 0 | Status: COMPLETED | OUTPATIENT
Start: 2017-08-24 | End: 2017-08-24

## 2017-08-24 RX ORDER — ASPIRIN/CALCIUM CARB/MAGNESIUM 324 MG
325 TABLET ORAL DAILY
Qty: 0 | Refills: 0 | Status: DISCONTINUED | OUTPATIENT
Start: 2017-08-25 | End: 2017-08-26

## 2017-08-24 RX ORDER — DOCUSATE SODIUM 100 MG
100 CAPSULE ORAL THREE TIMES A DAY
Qty: 0 | Refills: 0 | Status: DISCONTINUED | OUTPATIENT
Start: 2017-08-24 | End: 2017-09-02

## 2017-08-24 RX ORDER — SODIUM CHLORIDE 9 MG/ML
500 INJECTION, SOLUTION INTRAVENOUS ONCE
Qty: 0 | Refills: 0 | Status: COMPLETED | OUTPATIENT
Start: 2017-08-24

## 2017-08-24 RX ORDER — INSULIN HUMAN 100 [IU]/ML
2 INJECTION, SOLUTION SUBCUTANEOUS
Qty: 250 | Refills: 0 | Status: DISCONTINUED | OUTPATIENT
Start: 2017-08-24 | End: 2017-08-25

## 2017-08-24 RX ORDER — CEFUROXIME AXETIL 250 MG
1500 TABLET ORAL EVERY 8 HOURS
Qty: 0 | Refills: 0 | Status: COMPLETED | OUTPATIENT
Start: 2017-08-24 | End: 2017-08-25

## 2017-08-24 RX ORDER — FENTANYL CITRATE 50 UG/ML
50 INJECTION INTRAVENOUS
Qty: 0 | Refills: 0 | Status: DISCONTINUED | OUTPATIENT
Start: 2017-08-24 | End: 2017-08-25

## 2017-08-24 RX ADMIN — CHLORHEXIDINE GLUCONATE 15 MILLILITER(S): 213 SOLUTION TOPICAL at 18:01

## 2017-08-24 RX ADMIN — Medication 50 MILLIEQUIVALENT(S): at 23:15

## 2017-08-24 RX ADMIN — SODIUM CHLORIDE 2000 MILLILITER(S): 9 INJECTION, SOLUTION INTRAVENOUS at 20:20

## 2017-08-24 RX ADMIN — Medication 125 MILLILITER(S): at 21:48

## 2017-08-24 RX ADMIN — INSULIN HUMAN 2 UNIT(S)/HR: 100 INJECTION, SOLUTION SUBCUTANEOUS at 20:39

## 2017-08-24 RX ADMIN — SODIUM CHLORIDE 2000 MILLILITER(S): 9 INJECTION, SOLUTION INTRAVENOUS at 14:23

## 2017-08-24 RX ADMIN — INSULIN HUMAN 1.5 UNIT(S): 100 INJECTION, SOLUTION SUBCUTANEOUS at 21:18

## 2017-08-24 RX ADMIN — VASOPRESSIN 1.2 UNIT(S)/MIN: 20 INJECTION INTRAVENOUS at 16:26

## 2017-08-24 RX ADMIN — Medication 125 MILLILITER(S): at 16:25

## 2017-08-24 RX ADMIN — Medication 100 MILLIEQUIVALENT(S): at 15:16

## 2017-08-24 RX ADMIN — SODIUM CHLORIDE 1000 MILLILITER(S): 9 INJECTION, SOLUTION INTRAVENOUS at 21:24

## 2017-08-24 RX ADMIN — SODIUM CHLORIDE 2000 MILLILITER(S): 9 INJECTION, SOLUTION INTRAVENOUS at 16:26

## 2017-08-24 RX ADMIN — Medication 125 MILLILITER(S): at 22:04

## 2017-08-24 RX ADMIN — PANTOPRAZOLE SODIUM 40 MILLIGRAM(S): 20 TABLET, DELAYED RELEASE ORAL at 15:14

## 2017-08-24 RX ADMIN — SODIUM CHLORIDE 2000 MILLILITER(S): 9 INJECTION, SOLUTION INTRAVENOUS at 15:46

## 2017-08-24 RX ADMIN — Medication 16 MICROGRAM(S)/KG/MIN: at 16:26

## 2017-08-24 RX ADMIN — Medication 250 MILLIGRAM(S): at 19:52

## 2017-08-24 RX ADMIN — Medication 100 MILLIGRAM(S): at 21:47

## 2017-08-24 RX ADMIN — Medication 100 MILLIEQUIVALENT(S): at 15:46

## 2017-08-24 RX ADMIN — Medication 100 MILLIGRAM(S): at 18:26

## 2017-08-24 RX ADMIN — Medication 100 MILLIEQUIVALENT(S): at 16:50

## 2017-08-24 NOTE — CONSULT NOTE ADULT - ASSESSMENT
Oropharyngeal bleed s/p Open heart surgery. Likely ANITA trauma/laceration. Minimal active bleeding currently  -Rec Observation.   -Ice to neck. ice chips/icewater after extubation  -Call if bleeding reoccurs with extubation

## 2017-08-24 NOTE — CHART NOTE - NSCHARTNOTEFT_GEN_A_CORE
On arrival to CTICU, patient was noted to have bloody secretions when suctioning the mouth. Anesthesiologist Dr. Rey at the bedside used laryngoscope to visualize the posterior pharynx. An approximately 1cm circular abrasion was noticed with erythema and mild oozing without active protuberant bleeding. Dr. Kinney was made aware. ENT was promptly called. Pending consult. At this time, there is no active bleeding. Will follow with ENT plan.

## 2017-08-24 NOTE — BRIEF OPERATIVE NOTE - PROCEDURE
Mitral valve replacement  08/24/2017  Re-do MVR, septal myomectomy, bovine pericardial patch repair of interatrial septum  Active  [id number] Mitral valve replacement  08/24/2017  Re-do MVR, septal myomectomy, bovine pericardial patch repair of interatrial septum  Active  Dudley Ricci

## 2017-08-24 NOTE — CONSULT NOTE ADULT - SUBJECTIVE AND OBJECTIVE BOX
76f s/p surgery today with oozing from oropharynx post op Open heart with ANITA.  Mild persistent oozing. ENT consulted for evaluation.  Gen: Intubate. Awake. Follows commands.  Nose: NGT right nostril. no bleeding.  OC. ETT Right oral commisure. Dark blood in oropharynx suctioned. Laceration appears to be on left oropharyx. No significant bleeding/welling up of blood.

## 2017-08-25 DIAGNOSIS — S11.21XA LACERATION WITHOUT FOREIGN BODY OF PHARYNX AND CERVICAL ESOPHAGUS, INITIAL ENCOUNTER: ICD-10-CM

## 2017-08-25 DIAGNOSIS — I05.9 RHEUMATIC MITRAL VALVE DISEASE, UNSPECIFIED: ICD-10-CM

## 2017-08-25 DIAGNOSIS — I48.91 UNSPECIFIED ATRIAL FIBRILLATION: ICD-10-CM

## 2017-08-25 LAB
ALBUMIN SERPL ELPH-MCNC: 3.2 G/DL — LOW (ref 3.3–5.2)
ALBUMIN SERPL ELPH-MCNC: 3.8 G/DL — SIGNIFICANT CHANGE UP (ref 3.3–5.2)
ALP SERPL-CCNC: 31 U/L — LOW (ref 40–120)
ALP SERPL-CCNC: 34 U/L — LOW (ref 40–120)
ALT FLD-CCNC: 23 U/L — SIGNIFICANT CHANGE UP
ALT FLD-CCNC: 31 U/L — SIGNIFICANT CHANGE UP
AMYLASE P1 CFR SERPL: 127 U/L — SIGNIFICANT CHANGE UP (ref 36–128)
ANION GAP SERPL CALC-SCNC: 17 MMOL/L — SIGNIFICANT CHANGE UP (ref 5–17)
ANION GAP SERPL CALC-SCNC: 17 MMOL/L — SIGNIFICANT CHANGE UP (ref 5–17)
AST SERPL-CCNC: 71 U/L — HIGH
AST SERPL-CCNC: 86 U/L — HIGH
BILIRUB SERPL-MCNC: 2.7 MG/DL — HIGH (ref 0.4–2)
BILIRUB SERPL-MCNC: 3 MG/DL — HIGH (ref 0.4–2)
BUN SERPL-MCNC: 14 MG/DL — SIGNIFICANT CHANGE UP (ref 8–20)
BUN SERPL-MCNC: 17 MG/DL — SIGNIFICANT CHANGE UP (ref 8–20)
CALCIUM SERPL-MCNC: 7.5 MG/DL — LOW (ref 8.6–10.2)
CALCIUM SERPL-MCNC: 7.6 MG/DL — LOW (ref 8.6–10.2)
CHLORIDE SERPL-SCNC: 108 MMOL/L — HIGH (ref 98–107)
CHLORIDE SERPL-SCNC: 111 MMOL/L — HIGH (ref 98–107)
CK MB CFR SERPL CALC: 35.9 NG/ML — HIGH (ref 0–6.7)
CK MB CFR SERPL CALC: 37.6 NG/ML — HIGH (ref 0–6.7)
CK SERPL-CCNC: 501 U/L — HIGH (ref 25–170)
CK SERPL-CCNC: 588 U/L — HIGH (ref 25–170)
CO2 SERPL-SCNC: 18 MMOL/L — LOW (ref 22–29)
CO2 SERPL-SCNC: 19 MMOL/L — LOW (ref 22–29)
CREAT SERPL-MCNC: 0.96 MG/DL — SIGNIFICANT CHANGE UP (ref 0.5–1.3)
CREAT SERPL-MCNC: 1.05 MG/DL — SIGNIFICANT CHANGE UP (ref 0.5–1.3)
GAS PNL BLDA: SIGNIFICANT CHANGE UP
GLUCOSE SERPL-MCNC: 141 MG/DL — HIGH (ref 70–115)
GLUCOSE SERPL-MCNC: 144 MG/DL — HIGH (ref 70–115)
HCT VFR BLD CALC: 31.3 % — LOW (ref 37–47)
HCT VFR BLD CALC: 32 % — LOW (ref 37–47)
HGB BLD-MCNC: 10.2 G/DL — LOW (ref 12–16)
HGB BLD-MCNC: 10.6 G/DL — LOW (ref 12–16)
LACTATE SERPL-SCNC: 2.8 MMOL/L — HIGH (ref 0.5–2)
LIDOCAIN IGE QN: 29 U/L — SIGNIFICANT CHANGE UP (ref 22–51)
MAGNESIUM SERPL-MCNC: 2 MG/DL — SIGNIFICANT CHANGE UP (ref 1.6–2.6)
MAGNESIUM SERPL-MCNC: 3.5 MG/DL — HIGH (ref 1.6–2.6)
MCHC RBC-ENTMCNC: 28.4 PG — SIGNIFICANT CHANGE UP (ref 27–31)
MCHC RBC-ENTMCNC: 28.7 PG — SIGNIFICANT CHANGE UP (ref 27–31)
MCHC RBC-ENTMCNC: 32.6 G/DL — SIGNIFICANT CHANGE UP (ref 32–36)
MCHC RBC-ENTMCNC: 33.1 G/DL — SIGNIFICANT CHANGE UP (ref 32–36)
MCV RBC AUTO: 86.7 FL — SIGNIFICANT CHANGE UP (ref 81–99)
MCV RBC AUTO: 87.2 FL — SIGNIFICANT CHANGE UP (ref 81–99)
PHOSPHATE SERPL-MCNC: 3.8 MG/DL — SIGNIFICANT CHANGE UP (ref 2.4–4.7)
PLATELET # BLD AUTO: 97 K/UL — LOW (ref 150–400)
PLATELET # BLD AUTO: 99 K/UL — LOW (ref 150–400)
POTASSIUM SERPL-MCNC: 5 MMOL/L — SIGNIFICANT CHANGE UP (ref 3.5–5.3)
POTASSIUM SERPL-MCNC: 5.3 MMOL/L — SIGNIFICANT CHANGE UP (ref 3.5–5.3)
POTASSIUM SERPL-SCNC: 5 MMOL/L — SIGNIFICANT CHANGE UP (ref 3.5–5.3)
POTASSIUM SERPL-SCNC: 5.3 MMOL/L — SIGNIFICANT CHANGE UP (ref 3.5–5.3)
PROT SERPL-MCNC: 4.4 G/DL — LOW (ref 6.6–8.7)
PROT SERPL-MCNC: 5.1 G/DL — LOW (ref 6.6–8.7)
RBC # BLD: 3.59 M/UL — LOW (ref 4.4–5.2)
RBC # BLD: 3.69 M/UL — LOW (ref 4.4–5.2)
RBC # FLD: 15.3 % — SIGNIFICANT CHANGE UP (ref 11–15.6)
RBC # FLD: 15.7 % — HIGH (ref 11–15.6)
SODIUM SERPL-SCNC: 143 MMOL/L — SIGNIFICANT CHANGE UP (ref 135–145)
SODIUM SERPL-SCNC: 147 MMOL/L — HIGH (ref 135–145)
TROPONIN T SERPL-MCNC: 1.03 NG/ML — HIGH (ref 0–0.06)
TROPONIN T SERPL-MCNC: 1.63 NG/ML — HIGH (ref 0–0.06)
WBC # BLD: 13.9 K/UL — HIGH (ref 4.8–10.8)
WBC # BLD: 16.3 K/UL — HIGH (ref 4.8–10.8)
WBC # FLD AUTO: 13.9 K/UL — HIGH (ref 4.8–10.8)
WBC # FLD AUTO: 16.3 K/UL — HIGH (ref 4.8–10.8)

## 2017-08-25 PROCEDURE — 93010 ELECTROCARDIOGRAM REPORT: CPT

## 2017-08-25 PROCEDURE — 71010: CPT | Mod: 26

## 2017-08-25 RX ORDER — DEXTROSE 50 % IN WATER 50 %
12.5 SYRINGE (ML) INTRAVENOUS ONCE
Qty: 0 | Refills: 0 | Status: DISCONTINUED | OUTPATIENT
Start: 2017-08-25 | End: 2017-09-02

## 2017-08-25 RX ORDER — SODIUM CHLORIDE 9 MG/ML
1000 INJECTION, SOLUTION INTRAVENOUS
Qty: 0 | Refills: 0 | Status: DISCONTINUED | OUTPATIENT
Start: 2017-08-25 | End: 2017-08-27

## 2017-08-25 RX ORDER — ACETAMINOPHEN 500 MG
1000 TABLET ORAL ONCE
Qty: 0 | Refills: 0 | Status: COMPLETED | OUTPATIENT
Start: 2017-08-25 | End: 2017-08-25

## 2017-08-25 RX ORDER — ENOXAPARIN SODIUM 100 MG/ML
40 INJECTION SUBCUTANEOUS DAILY
Qty: 0 | Refills: 0 | Status: DISCONTINUED | OUTPATIENT
Start: 2017-08-25 | End: 2017-08-26

## 2017-08-25 RX ORDER — KETOROLAC TROMETHAMINE 30 MG/ML
30 SYRINGE (ML) INJECTION ONCE
Qty: 0 | Refills: 0 | Status: DISCONTINUED | OUTPATIENT
Start: 2017-08-25 | End: 2017-08-25

## 2017-08-25 RX ORDER — POTASSIUM CHLORIDE 20 MEQ
10 PACKET (EA) ORAL ONCE
Qty: 0 | Refills: 0 | Status: COMPLETED | OUTPATIENT
Start: 2017-08-25 | End: 2017-08-25

## 2017-08-25 RX ORDER — INSULIN LISPRO 100/ML
VIAL (ML) SUBCUTANEOUS
Qty: 0 | Refills: 0 | Status: DISCONTINUED | OUTPATIENT
Start: 2017-08-25 | End: 2017-08-28

## 2017-08-25 RX ORDER — GLUCAGON INJECTION, SOLUTION 0.5 MG/.1ML
1 INJECTION, SOLUTION SUBCUTANEOUS ONCE
Qty: 0 | Refills: 0 | Status: DISCONTINUED | OUTPATIENT
Start: 2017-08-25 | End: 2017-08-27

## 2017-08-25 RX ORDER — MAGNESIUM SULFATE 500 MG/ML
2 VIAL (ML) INJECTION ONCE
Qty: 0 | Refills: 0 | Status: COMPLETED | OUTPATIENT
Start: 2017-08-25 | End: 2017-08-25

## 2017-08-25 RX ORDER — ALBUMIN HUMAN 25 %
250 VIAL (ML) INTRAVENOUS ONCE
Qty: 0 | Refills: 0 | Status: COMPLETED | OUTPATIENT
Start: 2017-08-25 | End: 2017-08-25

## 2017-08-25 RX ORDER — AMIODARONE HYDROCHLORIDE 400 MG/1
150 TABLET ORAL ONCE
Qty: 0 | Refills: 0 | Status: COMPLETED | OUTPATIENT
Start: 2017-08-25 | End: 2017-08-25

## 2017-08-25 RX ORDER — METOCLOPRAMIDE HCL 10 MG
10 TABLET ORAL ONCE
Qty: 0 | Refills: 0 | Status: COMPLETED | OUTPATIENT
Start: 2017-08-25 | End: 2017-08-25

## 2017-08-25 RX ORDER — FENTANYL CITRATE 50 UG/ML
12.5 INJECTION INTRAVENOUS
Qty: 0 | Refills: 0 | Status: DISCONTINUED | OUTPATIENT
Start: 2017-08-25 | End: 2017-08-25

## 2017-08-25 RX ORDER — DEXTROSE 50 % IN WATER 50 %
25 SYRINGE (ML) INTRAVENOUS ONCE
Qty: 0 | Refills: 0 | Status: DISCONTINUED | OUTPATIENT
Start: 2017-08-25 | End: 2017-09-02

## 2017-08-25 RX ORDER — SODIUM BICARBONATE 1 MEQ/ML
50 SYRINGE (ML) INTRAVENOUS ONCE
Qty: 0 | Refills: 0 | Status: COMPLETED | OUTPATIENT
Start: 2017-08-25 | End: 2017-08-25

## 2017-08-25 RX ORDER — PANTOPRAZOLE SODIUM 20 MG/1
40 TABLET, DELAYED RELEASE ORAL
Qty: 0 | Refills: 0 | Status: DISCONTINUED | OUTPATIENT
Start: 2017-08-25 | End: 2017-08-26

## 2017-08-25 RX ORDER — DEXTROSE 50 % IN WATER 50 %
1 SYRINGE (ML) INTRAVENOUS ONCE
Qty: 0 | Refills: 0 | Status: DISCONTINUED | OUTPATIENT
Start: 2017-08-25 | End: 2017-08-27

## 2017-08-25 RX ADMIN — FENTANYL CITRATE 12.5 MICROGRAM(S): 50 INJECTION INTRAVENOUS at 02:13

## 2017-08-25 RX ADMIN — AMIODARONE HYDROCHLORIDE 618 MILLIGRAM(S): 400 TABLET ORAL at 16:16

## 2017-08-25 RX ADMIN — ONDANSETRON 4 MILLIGRAM(S): 8 TABLET, FILM COATED ORAL at 22:15

## 2017-08-25 RX ADMIN — Medication 30 MILLIGRAM(S): at 07:14

## 2017-08-25 RX ADMIN — Medication 100 MILLIGRAM(S): at 05:32

## 2017-08-25 RX ADMIN — Medication 2: at 16:35

## 2017-08-25 RX ADMIN — Medication 500 MILLILITER(S): at 12:11

## 2017-08-25 RX ADMIN — Medication 10 MILLIGRAM(S): at 08:33

## 2017-08-25 RX ADMIN — FENTANYL CITRATE 12.5 MICROGRAM(S): 50 INJECTION INTRAVENOUS at 02:12

## 2017-08-25 RX ADMIN — Medication 100 MILLIGRAM(S): at 22:00

## 2017-08-25 RX ADMIN — Medication 400 MILLIGRAM(S): at 08:33

## 2017-08-25 RX ADMIN — FENTANYL CITRATE 12.5 MICROGRAM(S): 50 INJECTION INTRAVENOUS at 02:05

## 2017-08-25 RX ADMIN — Medication 100 MILLIGRAM(S): at 14:30

## 2017-08-25 RX ADMIN — Medication 30 MILLIGRAM(S): at 06:27

## 2017-08-25 RX ADMIN — Medication 50 MILLIEQUIVALENT(S): at 15:40

## 2017-08-25 RX ADMIN — Medication 250 MILLIGRAM(S): at 07:27

## 2017-08-25 RX ADMIN — Medication 1000 MILLIGRAM(S): at 19:00

## 2017-08-25 RX ADMIN — AMIODARONE HYDROCHLORIDE 618 MILLIGRAM(S): 400 TABLET ORAL at 11:50

## 2017-08-25 RX ADMIN — ONDANSETRON 4 MILLIGRAM(S): 8 TABLET, FILM COATED ORAL at 12:16

## 2017-08-25 RX ADMIN — ENOXAPARIN SODIUM 40 MILLIGRAM(S): 100 INJECTION SUBCUTANEOUS at 12:16

## 2017-08-25 RX ADMIN — Medication 400 MILLIGRAM(S): at 02:41

## 2017-08-25 RX ADMIN — Medication 125 MILLILITER(S): at 15:40

## 2017-08-25 RX ADMIN — Medication 400 MILLIGRAM(S): at 18:34

## 2017-08-25 RX ADMIN — Medication 1000 MILLIGRAM(S): at 09:33

## 2017-08-25 RX ADMIN — Medication 1000 MILLIGRAM(S): at 03:15

## 2017-08-25 RX ADMIN — Medication 250 MILLIGRAM(S): at 20:00

## 2017-08-25 RX ADMIN — ONDANSETRON 4 MILLIGRAM(S): 8 TABLET, FILM COATED ORAL at 00:40

## 2017-08-25 RX ADMIN — Medication 50 GRAM(S): at 11:49

## 2017-08-25 RX ADMIN — ONDANSETRON 4 MILLIGRAM(S): 8 TABLET, FILM COATED ORAL at 05:53

## 2017-08-25 RX ADMIN — Medication 325 MILLIGRAM(S): at 12:16

## 2017-08-25 RX ADMIN — FENTANYL CITRATE 12.5 MICROGRAM(S): 50 INJECTION INTRAVENOUS at 02:00

## 2017-08-25 RX ADMIN — Medication 100 MILLIEQUIVALENT(S): at 00:08

## 2017-08-25 NOTE — PROGRESS NOTE ADULT - SUBJECTIVE AND OBJECTIVE BOX
CARDIAC ANESTHESIA POST-OP EVALUATION    76y Female POSTOP DAY 1 S/P MVR Myomectomy    MENTAL STATUS: Patient participation [ X ] Awake     [  ] Arousable     [  ] Sedated    AIRWAY PATENCY: [ X ] Satisfactory  [  ] Other:     Vital Signs Last 24 Hrs  T(C): 37.3 (25 Aug 2017 08:00), Max: 38 (24 Aug 2017 21:00)  T(F): 99.1 (25 Aug 2017 08:00), Max: 100.4 (24 Aug 2017 21:00)  HR: 73 (25 Aug 2017 08:00) (61 - 89)  BP: 93/46 (25 Aug 2017 06:00) (93/46 - 102/53)  BP(mean): 66 (25 Aug 2017 06:00) (66 - 70)  RR: 18 (25 Aug 2017 08:00) (4 - 38)  SpO2: 98% (25 Aug 2017 08:00) (95% - 100%)  I&O's Summary    24 Aug 2017 07:01  -  25 Aug 2017 07:00  --------------------------------------------------------  IN: 5498 mL / OUT: 2695 mL / NET: 2803 mL    25 Aug 2017 07:01  -  25 Aug 2017 08:27  --------------------------------------------------------  IN: 280 mL / OUT: 65 mL / NET: 215 mL          NAUSEA/ VOMITTING:  [  ] NONE  [ X ] IMPROVING CONTROL [  ] OTHER     PAIN: [ X ] CONTROLLED WITH CURRENT REGIMEN  [  ] OTHER    [ X ] NO APPARENT ANESTHESIA COMPLICATIONS      Comments:  Posterior pharynx evaluated by ENT.  Small posterior laceration noted, no active bleeding, no interventions needed.  Asymptomatic.

## 2017-08-25 NOTE — PROGRESS NOTE ADULT - SUBJECTIVE AND OBJECTIVE BOX
76y Female s/p Mitral valve replacement, myomectomy and bovine patch of atrial septum      Subjective:  Patient has no acute complaints    T(C): 37.2 (08-25-17 @ 04:00), Max: 38 (08-24-17 @ 21:00)  HR: 78 (08-25-17 @ 04:30) (61 - 89)  BP: 102/53 (08-24-17 @ 14:30) (102/53 - 152/64)  ABP: 111/51 (08-25-17 @ 04:30) (91/47 - 132/59)  ABP(mean): 72 (08-25-17 @ 04:30) (59 - 89)  RR: 11 (08-25-17 @ 04:30) (4 - 38)  SpO2: 96% (08-25-17 @ 04:30) (95% - 100%)    CVP(mm Hg): 14 (08-25-17 @ 04:30) (6 - 28)  CO: 3.5 (08-25-17 @ 04:00) (3.5 - 3.9)  CI: 2.3 (08-25-17 @ 04:00) (2.3 - 2.5)          08-25    147<H>  |  111<H>  |  14.0  ----------------------------<  141<H>  5.3   |  19.0<L>  |  0.96    Ca    7.5<L>      25 Aug 2017 03:24  Mg     2.0     08-25    TPro  4.4<L>  /  Alb  3.2<L>  /  TBili  3.0<H>  /  DBili  x   /  AST  71<H>  /  ALT  23  /  AlkPhos  31<L>  08-25                               10.6   16.3  )-----------( 99       ( 25 Aug 2017 03:24 )             32.0        PT/INR - ( 24 Aug 2017 14:08 )   PT: 14.6 sec;   INR: 1.32 ratio         PTT - ( 24 Aug 2017 14:08 )  PTT:37.8 sec  ABG - ( 25 Aug 2017 01:46 )  pH: 7.30  /  pCO2: 45    /  pO2: 75    / HCO3: 21    / Base Excess: -4.6  /  SaO2: 96                   CARDIAC MARKERS ( 25 Aug 2017 03:24 )   U/L / CKMB37.6 ng/mL / Troponin T1.63 ng/mL /  CARDIAC MARKERS ( 24 Aug 2017 14:08 )   U/L / CKMB65.0 ng/mL / Troponin T2.32 ng/mL /        CAPILLARY BLOOD GLUCOSE  124 (25 Aug 2017 04:00)  117 (25 Aug 2017 02:00)  120 (25 Aug 2017 01:00)  117 (25 Aug 2017 00:00)  119 (24 Aug 2017 23:00)  134 (24 Aug 2017 22:00)  122 (24 Aug 2017 21:00)  151 (24 Aug 2017 20:00)  130 (24 Aug 2017 19:00)  122 (24 Aug 2017 18:00)  110 (24 Aug 2017 17:00)  77 (24 Aug 2017 16:15)  78 (24 Aug 2017 15:15)                CXR: < from: Xray Chest 1 View AP/PA. (08.24.17 @ 13:30) >  Impression:  Postoperative changes    < end of copied text >      I&O's Detail    24 Aug 2017 07:01  -  25 Aug 2017 04:47  --------------------------------------------------------  IN:    Albumin 5%  - 250 mL: 1000 mL    insulin Infusion: 4 mL    Lactated Ringers IV Bolus: 2000 mL    Lactated Ringers IV Bolus: 1000 mL    norepinephrine Infusion: 260 mL    sodium chloride 0.9%.: 75 mL    sodium chloride 0.9%.: 150 mL    Solution: 350 mL    Solution: 100 mL    Solution: 100 mL    Solution: 250 mL    vasopressin Infusion: 68 mL  Total IN: 5357 mL    OUT:    Chest Tube: 490 mL    Chest Tube: 520 mL    Chest Tube: 470 mL    Indwelling Catheter - Urethral: 895 mL  Total OUT: 2375 mL    Total NET: 2982 mL          MEDICATIONS  (STANDING):  aspirin enteric coated 325 milliGRAM(s) Oral daily  docusate sodium 100 milliGRAM(s) Oral three times a day  insulin Infusion 2 Unit(s)/Hr (2 mL/Hr) IV Continuous <Continuous>  sodium chloride 0.9%. @15cc  vasopressin Infusion 0.02 Unit(s)/Min (1.2 mL/Hr) IV Continuous <Continuous>  norepinephrine Infusion 0.164 MICROgram(s)/kG/Min (16 mL/Hr) IV Continuous <Continuous>  vancomycin  IVPB 1000 milliGRAM(s) IV Intermittent every 12 hours  cefuroxime  IVPB 1500 milliGRAM(s) IV Intermittent every 8 hours    MEDICATIONS  (PRN):  acetaminophen  IVPB. 1000 milliGRAM(s) IV Intermittent once PRN Mild Pain (1 - 3)  ondansetron Injectable 4 milliGRAM(s) IV Push every 6 hours PRN Nausea and/or Vomiting      Physical Exam  Neuro: A+O x 3, non-focal, speech clear and intact  Pulm: CTA, equal bilaterally  CV: RRR, +S1S2  Abd: soft, NT, ND, +BS  Ext: NEFF x 4, no edema

## 2017-08-25 NOTE — PROGRESS NOTE ADULT - ASSESSMENT
Patient is a 76 year old female with PMH of afib, BCC, HTN, AI, rheumatic fever, MR, ovarian cyst, fibroid and hypertrophic cardiomyopathy.  Patient is now s/p a re-op MVR with myomectomy and bovine patch of atrial septum.    Patient extubated and pressors decreasing with adequate hemodynamics

## 2017-08-25 NOTE — PROGRESS NOTE ADULT - PROBLEM SELECTOR PLAN 3
normal sinus rhythm currently.  continue monitoring and start beta blocker as tolerated when off of pressors.

## 2017-08-26 DIAGNOSIS — T88.59XA OTHER COMPLICATIONS OF ANESTHESIA, INITIAL ENCOUNTER: ICD-10-CM

## 2017-08-26 DIAGNOSIS — D69.6 THROMBOCYTOPENIA, UNSPECIFIED: ICD-10-CM

## 2017-08-26 DIAGNOSIS — R74.0 NONSPECIFIC ELEVATION OF LEVELS OF TRANSAMINASE AND LACTIC ACID DEHYDROGENASE [LDH]: ICD-10-CM

## 2017-08-26 DIAGNOSIS — R79.89 OTHER SPECIFIED ABNORMAL FINDINGS OF BLOOD CHEMISTRY: ICD-10-CM

## 2017-08-26 LAB
ALBUMIN SERPL ELPH-MCNC: 3.6 G/DL — SIGNIFICANT CHANGE UP (ref 3.3–5.2)
ALP SERPL-CCNC: 38 U/L — LOW (ref 40–120)
ALT FLD-CCNC: 142 U/L — HIGH
AMYLASE P1 CFR SERPL: 145 U/L — HIGH (ref 36–128)
ANION GAP SERPL CALC-SCNC: 14 MMOL/L — SIGNIFICANT CHANGE UP (ref 5–17)
APTT BLD: 43.2 SEC — HIGH (ref 27.5–37.4)
AST SERPL-CCNC: 236 U/L — HIGH
BILIRUB SERPL-MCNC: 2.6 MG/DL — HIGH (ref 0.4–2)
BUN SERPL-MCNC: 21 MG/DL — HIGH (ref 8–20)
CALCIUM SERPL-MCNC: 7.6 MG/DL — LOW (ref 8.6–10.2)
CHLORIDE SERPL-SCNC: 109 MMOL/L — HIGH (ref 98–107)
CK MB CFR SERPL CALC: 29 NG/ML — HIGH (ref 0–6.7)
CK SERPL-CCNC: 581 U/L — HIGH (ref 25–170)
CO2 SERPL-SCNC: 21 MMOL/L — LOW (ref 22–29)
CREAT SERPL-MCNC: 1.01 MG/DL — SIGNIFICANT CHANGE UP (ref 0.5–1.3)
GAS PNL BLDA: SIGNIFICANT CHANGE UP
GLUCOSE SERPL-MCNC: 122 MG/DL — HIGH (ref 70–115)
HCT VFR BLD CALC: 34.3 % — LOW (ref 37–47)
HGB BLD-MCNC: 11.5 G/DL — LOW (ref 12–16)
INR BLD: 1.54 RATIO — HIGH (ref 0.88–1.16)
LIDOCAIN IGE QN: 24 U/L — SIGNIFICANT CHANGE UP (ref 22–51)
MAGNESIUM SERPL-MCNC: 2.9 MG/DL — HIGH (ref 1.6–2.6)
MCHC RBC-ENTMCNC: 29.3 PG — SIGNIFICANT CHANGE UP (ref 27–31)
MCHC RBC-ENTMCNC: 33.5 G/DL — SIGNIFICANT CHANGE UP (ref 32–36)
MCV RBC AUTO: 87.3 FL — SIGNIFICANT CHANGE UP (ref 81–99)
PLATELET # BLD AUTO: 73 K/UL — LOW (ref 150–400)
POTASSIUM SERPL-MCNC: 4.6 MMOL/L — SIGNIFICANT CHANGE UP (ref 3.5–5.3)
POTASSIUM SERPL-SCNC: 4.6 MMOL/L — SIGNIFICANT CHANGE UP (ref 3.5–5.3)
PROT SERPL-MCNC: 5.2 G/DL — LOW (ref 6.6–8.7)
PROTHROM AB SERPL-ACNC: 17.1 SEC — HIGH (ref 9.8–12.7)
RBC # BLD: 3.93 M/UL — LOW (ref 4.4–5.2)
RBC # FLD: 15.8 % — HIGH (ref 11–15.6)
SODIUM SERPL-SCNC: 144 MMOL/L — SIGNIFICANT CHANGE UP (ref 135–145)
TROPONIN T SERPL-MCNC: 0.76 NG/ML — HIGH (ref 0–0.06)
WBC # BLD: 10.2 K/UL — SIGNIFICANT CHANGE UP (ref 4.8–10.8)
WBC # FLD AUTO: 10.2 K/UL — SIGNIFICANT CHANGE UP (ref 4.8–10.8)

## 2017-08-26 PROCEDURE — 93010 ELECTROCARDIOGRAM REPORT: CPT

## 2017-08-26 PROCEDURE — 74000: CPT | Mod: 26

## 2017-08-26 PROCEDURE — 74177 CT ABD & PELVIS W/CONTRAST: CPT | Mod: 26

## 2017-08-26 PROCEDURE — 71010: CPT | Mod: 26

## 2017-08-26 PROCEDURE — 76700 US EXAM ABDOM COMPLETE: CPT | Mod: 26

## 2017-08-26 PROCEDURE — 99222 1ST HOSP IP/OBS MODERATE 55: CPT

## 2017-08-26 RX ORDER — METOCLOPRAMIDE HCL 10 MG
5 TABLET ORAL ONCE
Qty: 0 | Refills: 0 | Status: COMPLETED | OUTPATIENT
Start: 2017-08-26 | End: 2017-08-26

## 2017-08-26 RX ORDER — SODIUM CHLORIDE 9 MG/ML
1000 INJECTION, SOLUTION INTRAVENOUS
Qty: 0 | Refills: 0 | Status: DISCONTINUED | OUTPATIENT
Start: 2017-08-26 | End: 2017-08-27

## 2017-08-26 RX ORDER — PANTOPRAZOLE SODIUM 20 MG/1
40 TABLET, DELAYED RELEASE ORAL DAILY
Qty: 0 | Refills: 0 | Status: DISCONTINUED | OUTPATIENT
Start: 2017-08-27 | End: 2017-08-27

## 2017-08-26 RX ORDER — WARFARIN SODIUM 2.5 MG/1
2 TABLET ORAL ONCE
Qty: 0 | Refills: 0 | Status: COMPLETED | OUTPATIENT
Start: 2017-08-26 | End: 2017-08-26

## 2017-08-26 RX ORDER — PSYLLIUM SEED (WITH DEXTROSE)
1 POWDER (GRAM) ORAL
Qty: 0 | Refills: 0 | Status: DISCONTINUED | OUTPATIENT
Start: 2017-08-26 | End: 2017-09-02

## 2017-08-26 RX ORDER — POLYETHYLENE GLYCOL 3350 17 G/17G
17 POWDER, FOR SOLUTION ORAL DAILY
Qty: 0 | Refills: 0 | Status: DISCONTINUED | OUTPATIENT
Start: 2017-08-26 | End: 2017-08-27

## 2017-08-26 RX ORDER — ONDANSETRON 8 MG/1
4 TABLET, FILM COATED ORAL ONCE
Qty: 0 | Refills: 0 | Status: COMPLETED | OUTPATIENT
Start: 2017-08-26 | End: 2017-08-26

## 2017-08-26 RX ORDER — SENNA PLUS 8.6 MG/1
2 TABLET ORAL AT BEDTIME
Qty: 0 | Refills: 0 | Status: DISCONTINUED | OUTPATIENT
Start: 2017-08-26 | End: 2017-09-02

## 2017-08-26 RX ORDER — PANTOPRAZOLE SODIUM 20 MG/1
20 TABLET, DELAYED RELEASE ORAL ONCE
Qty: 0 | Refills: 0 | Status: COMPLETED | OUTPATIENT
Start: 2017-08-26 | End: 2017-08-26

## 2017-08-26 RX ORDER — SODIUM CHLORIDE 9 MG/ML
250 INJECTION, SOLUTION INTRAVENOUS ONCE
Qty: 0 | Refills: 0 | Status: COMPLETED | OUTPATIENT
Start: 2017-08-26 | End: 2017-08-26

## 2017-08-26 RX ADMIN — POLYETHYLENE GLYCOL 3350 17 GRAM(S): 17 POWDER, FOR SOLUTION ORAL at 09:52

## 2017-08-26 RX ADMIN — SODIUM CHLORIDE 1000 MILLILITER(S): 9 INJECTION, SOLUTION INTRAVENOUS at 10:07

## 2017-08-26 RX ADMIN — Medication 100 MILLIGRAM(S): at 22:01

## 2017-08-26 RX ADMIN — ONDANSETRON 4 MILLIGRAM(S): 8 TABLET, FILM COATED ORAL at 14:30

## 2017-08-26 RX ADMIN — SENNA PLUS 2 TABLET(S): 8.6 TABLET ORAL at 22:01

## 2017-08-26 RX ADMIN — Medication 1 PACKET(S): at 19:33

## 2017-08-26 RX ADMIN — Medication 5 MILLIGRAM(S): at 08:30

## 2017-08-26 RX ADMIN — Medication 10 MILLIGRAM(S): at 19:33

## 2017-08-26 RX ADMIN — WARFARIN SODIUM 2 MILLIGRAM(S): 2.5 TABLET ORAL at 22:01

## 2017-08-26 RX ADMIN — PANTOPRAZOLE SODIUM 20 MILLIGRAM(S): 20 TABLET, DELAYED RELEASE ORAL at 08:29

## 2017-08-26 RX ADMIN — Medication 100 MILLIGRAM(S): at 14:07

## 2017-08-26 RX ADMIN — Medication 100 MILLIGRAM(S): at 06:38

## 2017-08-26 RX ADMIN — SODIUM CHLORIDE 50 MILLILITER(S): 9 INJECTION, SOLUTION INTRAVENOUS at 14:38

## 2017-08-26 RX ADMIN — Medication 250 MILLIGRAM(S): at 08:30

## 2017-08-26 NOTE — CONSULT NOTE ADULT - PROBLEM SELECTOR RECOMMENDATION 9
for the present would just continue zofran and add pantoprazole. for the present would just continue zofran as needed and pantoprazole

## 2017-08-26 NOTE — PROGRESS NOTE ADULT - ASSESSMENT
Patient is a 76 year old female with PMH of afib, BCC, HTN, AI, rheumatic fever, MR, ovarian cyst, fibroid and hypertrophic cardiomyopathy.  Patient is now s/p a re-op MVR with myomectomy and bovine patch of atrial septum.    Patient received 1U PRCB for anemia w/ compromised hemodynamics, after receiving blood, patient is off pressors.

## 2017-08-26 NOTE — CONSULT NOTE ADULT - SUBJECTIVE AND OBJECTIVE BOX
Patient is a 76y old  Female who presents with a chief complaint of "I am having a valve replacement".       HPI:  77 y/o female with CAD and previous mitral valve replacement in 2007 now has new on set SOBOE and fatigue , angiogram showed sever subvalvular stenosis and hypertrophied septum patient now presents for reoperative sternotomy septal myomectomy possible mitral valve repair. LFTs pre op on 8/10 wer all normal. She was admitted 2 days ago and underwent septal myotomy, bovine pericardial patch, repair of intratrial septum and MVR. Post op she had some bleeding from a laceration in the back of her throat. Today she has nausea with no vomiting. On 8/24 post op transaminases were slightly elevated and have increased. The bili yesterday was up to three but today is down to 2.6. She denies any abdominal pain. Her mother had colon cancer and she underwent a sceening colonoscopy just 11/2 years ago. There is no rectal bleeding or melena. She denies heavy alcohol use.      REVIEW OF SYSTEMS:    CONSTITUTIONAL: No fever, weight loss, or fatigue  EYES: No eye pain, visual disturbances, or discharge  ENMT:  No difficulty hearing, tinnitus, vertigo; No sinus or throat pains  RESPIRATORY: No cough, wheezing, chills or hemoptysis; mild SOB  CARDIOVASCULAR: There ispostop chest wall pain  GASTROINTESTINAL: as agbove  NEUROLOGICAL: No headaches, memory loss, loss of strength, numbness, or tremors  SKIN: No itching, burning, rashes, or lesions   LYMPH NODES: No enlarged glands  MUSCULOSKELETAL: No joint pain or swelling; No muscle, back, or extremity pain  PSYCHIATRIC: No depression, anxiety, mood swings, or difficulty sleeping  HEME/LYMPH: No easy bruising, or bleeding gums  ALLERY AND IMMUNOLOGIC: No hives or eczema      PAST MEDICAL & SURGICAL HISTORY:  A-fib: 2007 coverted 1/2008  BCC (basal cell carcinoma of skin)  HTN (hypertension), benign  Aortic stenosis  Mitral valve disease  Rheumatic fever: at 11 years of age  Ovarian cyst  Fibroid  BCC (basal cell carcinoma): removed from right neck  S/P cardiac catheterization: 2007  S/P tonsillectomy  S/P MVR (mitral valve replacement): 2007 - bovine  S/P hysterectomy with oophorectomy      FAMILY HISTORY:  Family history of early CAD (Father)  Family history of colon cancer in mother (Mother)  Family history of heart attack (Mother)  Family history of breast cancer in mother (Mother)      SOCIAL HISTORY:  Smoking Status: [ ] Current, [ ] Former, [ ] Never  Pack Years:  Alcohol Use:social at most    MEDICATIONS:  MEDICATIONS  (STANDING):  docusate sodium 100 milliGRAM(s) Oral three times a day  sodium chloride 0.9%. 1000 milliLiter(s) (10 mL/Hr) IV Continuous <Continuous>  sodium chloride 0.9%. 1000 milliLiter(s) (5 mL/Hr) IV Continuous <Continuous>  insulin lispro (HumaLOG) corrective regimen sliding scale   SubCutaneous Before meals and at bedtime  dextrose 5%. 1000 milliLiter(s) (50 mL/Hr) IV Continuous <Continuous>  dextrose 50% Injectable 12.5 Gram(s) IV Push once  dextrose 50% Injectable 25 Gram(s) IV Push once  dextrose 50% Injectable 25 Gram(s) IV Push once  polyethylene glycol 3350 17 Gram(s) Oral daily  senna 2 Tablet(s) Oral at bedtime  warfarin 2 milliGRAM(s) Oral once  dextrose 5%. 1000 milliLiter(s) (50 mL/Hr) IV Continuous <Continuous>    MEDICATIONS  (PRN):  dextrose Gel 1 Dose(s) Oral once PRN Blood Glucose LESS THAN 70 milliGRAM(s)/deciliter  glucagon  Injectable 1 milliGRAM(s) IntraMuscular once PRN Glucose LESS THAN 70 milligrams/deciliter      Allergies    adhesives (Urticaria)  No Known Drug Allergies    Intolerances    [facility] PT (Unknown)      Vital Signs Last 24 Hrs  T(C): 37.2 (26 Aug 2017 13:00), Max: 37.2 (25 Aug 2017 23:00)  T(F): 99 (26 Aug 2017 13:00), Max: 99 (25 Aug 2017 23:00)  HR: 77 (26 Aug 2017 15:00) (60 - 83)  BP: 138/62 (26 Aug 2017 15:00) (126/61 - 146/61)  BP(mean): 89 (26 Aug 2017 15:00) (86 - 93)  RR: 9 (26 Aug 2017 15:00) (9 - 23)  SpO2: 100% (26 Aug 2017 15:00) (95% - 100%)    08-25 @ 07:01  -  08-26 @ 07:00  --------------------------------------------------------  IN: 2607.5 mL / OUT: 1410 mL / NET: 1197.5 mL    08-26 @ 07:01  - 08-26 @ 15:39  --------------------------------------------------------  IN: 865 mL / OUT: 285 mL / NET: 580 mL          PHYSICAL EXAM:    General: Well developed; well nourished; in no acute distress, sitting up in chair  HEENT: MMM, conjunctiva and sclera clear  Lungs: Clear  Heart: Rhythm Regular  Gastrointestinal: Soft, non-tender non-distended; Normal bowel sounds; No rebound or guarding; No Organomegaly & No Masses  Extremities: Normal range of motion, No clubbing, cyanosis or edema  Neurological: Alert and oriented x3, Non-focal  Skin: Warm and dry. No obvious rash      LABS:                        11.5   10.2  )-----------( 73       ( 26 Aug 2017 00:25 )             34.3     08-26    144  |  109<H>  |  21.0<H>  ----------------------------<  122<H>  4.6   |  21.0<L>  |  1.01    Ca    7.6<L>      26 Aug 2017 00:25  Phos  3.8     08-25  Mg     2.9     08-26    TPro  5.2<L>  /  Alb  3.6  /  TBili  2.6<H>  /  DBili  x   /  AST  236<H>  /  ALT  142<H>  /  AlkPhos  38<L>  08-26

## 2017-08-26 NOTE — CONSULT NOTE ADULT - PROBLEM SELECTOR RECOMMENDATION 2
suggest repeat LFTs-if they don't start coming down in 1-2 days could do bedside sono but doubt any underlying significant pathology.

## 2017-08-26 NOTE — PROGRESS NOTE ADULT - SUBJECTIVE AND OBJECTIVE BOX
76y Female s/p Mitral valve replacement      Subjective:  Patient complains of nausea which is well controlled with IV zofran.  Denies abdominal pain    T(C): 37.2 (08-26-17 @ 01:00), Max: 37.3 (08-25-17 @ 07:00)  HR: 66 (08-26-17 @ 02:00) (60 - 82) now back in sinus rhythm, but in a-fib earlier  BP: 93/45 (08-25-17 @ 09:00) (93/45 - 93/46)  ABP: 113/57 (08-26-17 @ 02:00) (92/45 - 142/82)  ABP(mean): 79 (08-26-17 @ 02:00) (58 - 92)  RR: 16 (08-26-17 @ 02:00) (9 - 23)  SpO2: 99% (08-26-17 @ 02:00) (96% - 100%)  CVP(mm Hg): 4 (08-26-17 @ 02:00) (3 - 16)  CO: 3.3 (08-26-17 @ 02:00) (2.8 - 3.9)  CI: 2.2 (08-26-17 @ 02:00) (1.8 - 2.6)          08-26    144  |  109<H>  |  21.0<H>  ----------------------------<  122<H>  4.6   |  21.0<L>  |  1.01    Ca    7.6<L>      26 Aug 2017 00:25  Phos  3.8     08-25  Mg     2.9     08-26    TPro  5.2<L>  /  Alb  3.6  /  TBili  2.6<H>  /  DBili  x   /  AST  236<H>  /  ALT  142<H>  /  AlkPhos  38<L>  08-26                               11.5   10.2  )-----------( 73       ( 26 Aug 2017 00:25 )             34.3   PT/INR - ( 26 Aug 2017 00:25 )   PT: 17.1 sec;   INR: 1.54 ratio         PTT - ( 26 Aug 2017 00:25 )  PTT:43.2 sec  ABG - ( 26 Aug 2017 01:38 )  pH: 7.35  /  pCO2: 40    /  pO2: 98    / HCO3: 22    / Base Excess: -3.4  /  SaO2: 98                   CARDIAC MARKERS ( 26 Aug 2017 00:25 )   U/L / CKMB29.0 ng/mL / Troponin T0.76 ng/mL /  CARDIAC MARKERS ( 25 Aug 2017 12:58 )   U/L / CKMB35.9 ng/mL / Troponin T1.03 ng/mL /  CARDIAC MARKERS ( 25 Aug 2017 03:24 )   U/L / CKMB37.6 ng/mL / Troponin T1.63 ng/mL /        CAPILLARY BLOOD GLUCOSE  163 (25 Aug 2017 17:00)  137 (25 Aug 2017 12:31)  105 (25 Aug 2017 08:00)  109 (25 Aug 2017 06:00)  124 (25 Aug 2017 04:00)               CXR:   < from: Xray Chest 1 View AP/PA. (08.25.17 @ 04:07) >  Extubation and removal of nasogastric tube, otherwise stable.    < end of copied text >      I&O's Detail    24 Aug 2017 07:01  -  25 Aug 2017 07:00  --------------------------------------------------------  IN:    Albumin 5%  - 250 mL: 1000 mL    insulin Infusion: 4 mL    Lactated Ringers IV Bolus: 2000 mL    Lactated Ringers IV Bolus: 1000 mL    norepinephrine Infusion: 288 mL    sodium chloride 0.9%.: 90 mL    sodium chloride 0.9%.: 180 mL    Solution: 250 mL    Solution: 350 mL    Solution: 100 mL    Solution: 150 mL    vasopressin Infusion: 86 mL  Total IN: 5498 mL    OUT:    Chest Tube: 550 mL    Chest Tube: 590 mL    Chest Tube: 550 mL    Indwelling Catheter - Urethral: 1005 mL  Total OUT: 2695 mL    Total NET: 2803 mL      25 Aug 2017 07:01  -  26 Aug 2017 02:49  --------------------------------------------------------  IN:    Albumin 5%  - 250 mL: 500 mL    norepinephrine Infusion: 160 mL    Oral Fluid: 240 mL    Packed Red Blood Cells: 320 mL    sodium chloride 0.9%.: 180 mL    sodium chloride 0.9%.: 90 mL    Solution: 50 mL    Solution: 200 mL    Solution: 200 mL    Solution: 500 mL    Solution: 50 mL    vasopressin Infusion: 82.5 mL  Total IN: 2572.5 mL    OUT:    Chest Tube: 50 mL    Chest Tube: 280 mL    Chest Tube: 240 mL    Indwelling Catheter - Urethral: 575 mL  Total OUT: 1145 mL    Total NET: 1427.5 mL          MEDICATIONS  (STANDING):  aspirin enteric coated 325 milliGRAM(s) Oral daily  docusate sodium 100 milliGRAM(s) Oral three times a day  sodium chloride 0.9%. @15cc/hr  vasopressin Infusion 0.02 Unit(s)/Min (1.2 mL/Hr) IV Continuous <Continuous>  norepinephrine Infusion 0.164 MICROgram(s)/kG/Min (16 mL/Hr) IV Continuous <Continuous>  vancomycin  IVPB 1000 milliGRAM(s) IV Intermittent every 12 hours  enoxaparin Injectable 40 milliGRAM(s) SubCutaneous daily  insulin lispro (HumaLOG) corrective regimen sliding scale   SubCutaneous Before meals and at bedtime  pantoprazole    Tablet 40 milliGRAM(s) Oral before breakfast    MEDICATIONS  (PRN):  dextrose Gel 1 Dose(s) Oral once PRN Blood Glucose LESS THAN 70 milliGRAM(s)/deciliter  glucagon  Injectable 1 milliGRAM(s) IntraMuscular once PRN Glucose LESS THAN 70 milligrams/deciliter      Physical Exam  Neuro: A+O x 3, non-focal, speech clear and intact  Pulm: CTA, equal bilaterally  CV: RRR, +S1S2  Abd: Soft, nontender, nondistended. No peritoneal signs appreciated on exam.   Ext: NEFF x 4, no edema  Inc: CHANDU C/D/I/stable w/ mamtag    -----------------------------------------------------------------------------------------------------------------------------------------------------------------------------

## 2017-08-26 NOTE — CONSULT NOTE ADULT - ASSESSMENT
The patient does not have chronic liver disease as her LFTs were normal before surgery. There is no abdominal pain so I highly doubt biliary tract disease such as cholecystitis. Most likely she has either mild post op cholestasis and /or some passive congestion of her liver post op. In any event the bili is coming down and I anticipate that with time so will the transaminases. The nausea is very nonspecific and of unclear etiology but not surprizing given her extensive surgery just 2 days ago.

## 2017-08-27 LAB
ALBUMIN SERPL ELPH-MCNC: 3.5 G/DL — SIGNIFICANT CHANGE UP (ref 3.3–5.2)
ALP SERPL-CCNC: 44 U/L — SIGNIFICANT CHANGE UP (ref 40–120)
ALT FLD-CCNC: 116 U/L — HIGH
AMYLASE P1 CFR SERPL: 212 U/L — HIGH (ref 36–128)
ANION GAP SERPL CALC-SCNC: 11 MMOL/L — SIGNIFICANT CHANGE UP (ref 5–17)
APTT BLD: 35.7 SEC — SIGNIFICANT CHANGE UP (ref 27.5–37.4)
AST SERPL-CCNC: 127 U/L — HIGH
BILIRUB DIRECT SERPL-MCNC: 0.7 MG/DL — HIGH (ref 0–0.3)
BILIRUB INDIRECT FLD-MCNC: 1.3 MG/DL — HIGH (ref 0.2–1)
BILIRUB SERPL-MCNC: 2 MG/DL — SIGNIFICANT CHANGE UP (ref 0.4–2)
BUN SERPL-MCNC: 22 MG/DL — HIGH (ref 8–20)
CALCIUM SERPL-MCNC: 8.2 MG/DL — LOW (ref 8.6–10.2)
CHLORIDE SERPL-SCNC: 105 MMOL/L — SIGNIFICANT CHANGE UP (ref 98–107)
CHOLEST SERPL-MCNC: 79 MG/DL — LOW (ref 110–199)
CO2 SERPL-SCNC: 24 MMOL/L — SIGNIFICANT CHANGE UP (ref 22–29)
CREAT SERPL-MCNC: 0.91 MG/DL — SIGNIFICANT CHANGE UP (ref 0.5–1.3)
GLUCOSE SERPL-MCNC: 132 MG/DL — HIGH (ref 70–115)
HCT VFR BLD CALC: 33.1 % — LOW (ref 37–47)
HDLC SERPL-MCNC: 32 MG/DL — LOW
HGB BLD-MCNC: 11.2 G/DL — LOW (ref 12–16)
INR BLD: 1.13 RATIO — SIGNIFICANT CHANGE UP (ref 0.88–1.16)
LIDOCAIN IGE QN: 48 U/L — SIGNIFICANT CHANGE UP (ref 22–51)
LIPID PNL WITH DIRECT LDL SERPL: 34 MG/DL — SIGNIFICANT CHANGE UP
MAGNESIUM SERPL-MCNC: 2.6 MG/DL — SIGNIFICANT CHANGE UP (ref 1.6–2.6)
MCHC RBC-ENTMCNC: 29.7 PG — SIGNIFICANT CHANGE UP (ref 27–31)
MCHC RBC-ENTMCNC: 33.8 G/DL — SIGNIFICANT CHANGE UP (ref 32–36)
MCV RBC AUTO: 87.8 FL — SIGNIFICANT CHANGE UP (ref 81–99)
PF4 HEPARIN CMPLX AB SER-ACNC: NEGATIVE — SIGNIFICANT CHANGE UP
PF4 HEPARIN CMPLX AB SERPL QL IA: 0.16 ABS — SIGNIFICANT CHANGE UP
PLATELET # BLD AUTO: 101 K/UL — LOW (ref 150–400)
POTASSIUM SERPL-MCNC: 4.2 MMOL/L — SIGNIFICANT CHANGE UP (ref 3.5–5.3)
POTASSIUM SERPL-SCNC: 4.2 MMOL/L — SIGNIFICANT CHANGE UP (ref 3.5–5.3)
PROT SERPL-MCNC: 5.2 G/DL — LOW (ref 6.6–8.7)
PROTHROM AB SERPL-ACNC: 12.5 SEC — SIGNIFICANT CHANGE UP (ref 9.8–12.7)
RBC # BLD: 3.77 M/UL — LOW (ref 4.4–5.2)
RBC # FLD: 16.2 % — HIGH (ref 11–15.6)
SODIUM SERPL-SCNC: 140 MMOL/L — SIGNIFICANT CHANGE UP (ref 135–145)
TOTAL CHOLESTEROL/HDL RATIO MEASUREMENT: 2 RATIO — LOW (ref 3.3–7.1)
TRIGL SERPL-MCNC: 65 MG/DL — SIGNIFICANT CHANGE UP (ref 10–200)
WBC # BLD: 15.7 K/UL — HIGH (ref 4.8–10.8)
WBC # FLD AUTO: 15.7 K/UL — HIGH (ref 4.8–10.8)

## 2017-08-27 PROCEDURE — 71010: CPT | Mod: 26,76

## 2017-08-27 PROCEDURE — 93010 ELECTROCARDIOGRAM REPORT: CPT | Mod: 77

## 2017-08-27 PROCEDURE — 93010 ELECTROCARDIOGRAM REPORT: CPT

## 2017-08-27 PROCEDURE — 99232 SBSQ HOSP IP/OBS MODERATE 35: CPT

## 2017-08-27 RX ORDER — METOPROLOL TARTRATE 50 MG
25 TABLET ORAL EVERY 12 HOURS
Qty: 0 | Refills: 0 | Status: DISCONTINUED | OUTPATIENT
Start: 2017-08-27 | End: 2017-08-29

## 2017-08-27 RX ORDER — PANTOPRAZOLE SODIUM 20 MG/1
40 TABLET, DELAYED RELEASE ORAL
Qty: 0 | Refills: 0 | Status: DISCONTINUED | OUTPATIENT
Start: 2017-08-27 | End: 2017-09-02

## 2017-08-27 RX ORDER — METOPROLOL TARTRATE 50 MG
5 TABLET ORAL ONCE
Qty: 0 | Refills: 0 | Status: COMPLETED | OUTPATIENT
Start: 2017-08-27 | End: 2017-08-27

## 2017-08-27 RX ORDER — AMIODARONE HYDROCHLORIDE 400 MG/1
200 TABLET ORAL DAILY
Qty: 0 | Refills: 0 | Status: DISCONTINUED | OUTPATIENT
Start: 2017-08-27 | End: 2017-08-30

## 2017-08-27 RX ORDER — WARFARIN SODIUM 2.5 MG/1
2 TABLET ORAL ONCE
Qty: 0 | Refills: 0 | Status: COMPLETED | OUTPATIENT
Start: 2017-08-27 | End: 2017-08-27

## 2017-08-27 RX ORDER — POLYETHYLENE GLYCOL 3350 17 G/17G
17 POWDER, FOR SOLUTION ORAL DAILY
Qty: 0 | Refills: 0 | Status: DISCONTINUED | OUTPATIENT
Start: 2017-08-27 | End: 2017-09-02

## 2017-08-27 RX ADMIN — Medication 25 MILLIGRAM(S): at 06:51

## 2017-08-27 RX ADMIN — Medication 5 MILLIGRAM(S): at 05:48

## 2017-08-27 RX ADMIN — Medication 1 PACKET(S): at 06:52

## 2017-08-27 RX ADMIN — PANTOPRAZOLE SODIUM 40 MILLIGRAM(S): 20 TABLET, DELAYED RELEASE ORAL at 13:12

## 2017-08-27 RX ADMIN — SENNA PLUS 2 TABLET(S): 8.6 TABLET ORAL at 21:13

## 2017-08-27 RX ADMIN — WARFARIN SODIUM 2 MILLIGRAM(S): 2.5 TABLET ORAL at 13:12

## 2017-08-27 RX ADMIN — Medication 1 PACKET(S): at 17:39

## 2017-08-27 RX ADMIN — Medication 100 MILLIGRAM(S): at 13:12

## 2017-08-27 RX ADMIN — AMIODARONE HYDROCHLORIDE 200 MILLIGRAM(S): 400 TABLET ORAL at 15:01

## 2017-08-27 RX ADMIN — Medication 100 MILLIGRAM(S): at 06:51

## 2017-08-27 RX ADMIN — Medication 100 MILLIGRAM(S): at 21:13

## 2017-08-27 RX ADMIN — Medication 25 MILLIGRAM(S): at 17:39

## 2017-08-27 NOTE — PROGRESS NOTE ADULT - SUBJECTIVE AND OBJECTIVE BOX
Pt seen and examined f/u nausea and elevated LFTs  This morning she feels much better with little if any nausea and tolerated some food. The LFTs are now coming down and only mildly elevated. The sonogram showed a normal hepatobiliary system. +BM.    REVIEW OF SYSTEMS:    CONSTITUTIONAL: No fever, weight loss, or fatigue  EYES: No eye pain, visual disturbances, or discharge  ENMT:  No difficulty hearing, tinnitus, vertigo; No sinus or throat pain  RESPIRATORY: No cough, wheezing, chills or hemoptysis; No shortness of breath  CARDIOVASCULAR: No chest pain, palpitations, dizziness, or leg swelling  GASTROINTESTINAL: No abdominal or epigastric pain. No nausea, vomiting, or hematemesis; No diarrhea or constipation. No melena or hematochezia.    MEDICATIONS:  MEDICATIONS  (STANDING):  docusate sodium 100 milliGRAM(s) Oral three times a day  sodium chloride 0.9%. 1000 milliLiter(s) (10 mL/Hr) IV Continuous <Continuous>  sodium chloride 0.9%. 1000 milliLiter(s) (5 mL/Hr) IV Continuous <Continuous>  insulin lispro (HumaLOG) corrective regimen sliding scale   SubCutaneous Before meals and at bedtime  dextrose 5%. 1000 milliLiter(s) (50 mL/Hr) IV Continuous <Continuous>  dextrose 50% Injectable 12.5 Gram(s) IV Push once  dextrose 50% Injectable 25 Gram(s) IV Push once  dextrose 50% Injectable 25 Gram(s) IV Push once  polyethylene glycol 3350 17 Gram(s) Oral daily  senna 2 Tablet(s) Oral at bedtime  pantoprazole  Injectable 40 milliGRAM(s) IV Push daily  psyllium Powder 1 Packet(s) Oral two times a day  bisacodyl Suppository 10 milliGRAM(s) Rectal daily  metoprolol 25 milliGRAM(s) Oral every 12 hours    MEDICATIONS  (PRN):  dextrose Gel 1 Dose(s) Oral once PRN Blood Glucose LESS THAN 70 milliGRAM(s)/deciliter  glucagon  Injectable 1 milliGRAM(s) IntraMuscular once PRN Glucose LESS THAN 70 milligrams/deciliter      Allergies    adhesives (Urticaria)  No Known Drug Allergies    Intolerances    [facility] PT (Unknown)      Vital Signs Last 24 Hrs  T(C): 36.9 (27 Aug 2017 08:00), Max: 37.5 (26 Aug 2017 17:00)  T(F): 98.4 (27 Aug 2017 08:00), Max: 99.5 (26 Aug 2017 17:00)  HR: 67 (27 Aug 2017 10:00) (67 - 118)  BP: 115/56 (27 Aug 2017 10:00) (101/70 - 157/70)  BP(mean): 78 (27 Aug 2017 10:00) (77 - 100)  RR: 11 (27 Aug 2017 10:00) (9 - 24)  SpO2: 94% (27 Aug 2017 10:00) (93% - 100%)    08-26 @ 07:01  -  08-27 @ 07:00  --------------------------------------------------------  IN: 1630 mL / OUT: 960 mL / NET: 670 mL    08-27 @ 07:01 - 08-27 @ 10:29  --------------------------------------------------------  IN: 415 mL / OUT: 50 mL / NET: 365 mL        PHYSICAL EXAM:    General: Well developed; well nourished; in no acute distress, right sided chest tube present  HEENT: MMM, conjunctiva and sclera clear  Gastrointestinal:Abdomen: Soft non-tender non-distended; Normal bowel sounds; No hepatosplenomegaly  Extremities: no cyanosis, clubbing or edema.  Skin: Warm and dry. No obvious rash    LABS:  ABG - ( 26 Aug 2017 01:38 )  pH: 7.35  /  pCO2: 40    /  pO2: 98    / HCO3: 22    / Base Excess: -3.4  /  SaO2: 98                  CBC Full  -  ( 27 Aug 2017 04:20 )  WBC Count : 15.7 K/uL  Hemoglobin : 11.2 g/dL  Hematocrit : 33.1 %  Platelet Count - Automated : 101 K/uL  Mean Cell Volume : 87.8 fl  Mean Cell Hemoglobin : 29.7 pg  Mean Cell Hemoglobin Concentration : 33.8 g/dL  Auto Neutrophil # : x  Auto Lymphocyte # : x  Auto Monocyte # : x  Auto Eosinophil # : x  Auto Basophil # : x  Auto Neutrophil % : x  Auto Lymphocyte % : x  Auto Monocyte % : x  Auto Eosinophil % : x  Auto Basophil % : x    08-27    140  |  105  |  22.0<H>  ----------------------------<  132<H>  4.2   |  24.0  |  0.91    Ca    8.2<L>      27 Aug 2017 04:20  Phos  3.8     08-25  Mg     2.6     08-27    TPro  5.2<L>  /  Alb  3.5  /  TBili  2.0  /  DBili  0.7<H>  /  AST  127<H>  /  ALT  116<H>  /  AlkPhos  44  08-27    PT/INR - ( 27 Aug 2017 04:20 )   PT: 12.5 sec;   INR: 1.13 ratio         PTT - ( 27 Aug 2017 04:20 )  PTT:35.7 sec                  RADIOLOGY & ADDITIONAL STUDIES (The following images were personally reviewed):

## 2017-08-27 NOTE — PROGRESS NOTE ADULT - PROBLEM SELECTOR PLAN 2
probably due to mild passive congestion of liver and resolving as well. Cat scan done and result pending but highly doubt significant GI pathology.

## 2017-08-27 NOTE — PROGRESS NOTE ADULT - PROBLEM SELECTOR PLAN 3
normal sinus rhythm currently.  continue monitoring presently consider starting lopressor in AM  Continue coumadin check INR in AM.

## 2017-08-27 NOTE — PROGRESS NOTE ADULT - SUBJECTIVE AND OBJECTIVE BOX
Subjective: Pt resting in bed comfortably with no acute events overnight. During shift pt with first bowel movement since OHS on the 24th. Pt reports improvement in Nausea.     T(C): 37.2 (08-26-17 @ 19:15), Max: 37.5 (08-26-17 @ 17:00)  HR: 77 (08-27-17 @ 01:00) (66 - 85)  BP: 157/68 (08-27-17 @ 01:00) (121/60 - 157/68)  ABP: 127/83 (08-26-17 @ 12:15) (100/52 - 140/59)  ABP(mean): 104 (08-26-17 @ 12:15) (72 - 110)  RR: 12 (08-27-17 @ 01:00) (9 - 24)  SpO2: 100% (08-27-17 @ 01:00) (95% - 100%)  Wt(kg): --  CVP(mm Hg): 7 (08-27-17 @ 01:00) (-1 - 21)  CO: 3 (08-26-17 @ 10:00) (3 - 3.6)  CI: 1.9 (08-26-17 @ 10:00) (1.9 - 2.3)  PA: --      08-26    144  |  109<H>  |  21.0<H>  ----------------------------<  122<H>  4.6   |  21.0<L>  |  1.01    Ca    7.6<L>      26 Aug 2017 00:25  Phos  3.8     08-25  Mg     2.9     08-26    TPro  5.2<L>  /  Alb  3.6  /  TBili  2.6<H>  /  DBili  x   /  AST  236<H>  /  ALT  142<H>  /  AlkPhos  38<L>  08-26                               11.5   10.2  )-----------( 73       ( 26 Aug 2017 00:25 )             34.3        PT/INR - ( 26 Aug 2017 00:25 )   PT: 17.1 sec;   INR: 1.54 ratio         PTT - ( 26 Aug 2017 00:25 )  PTT:43.2 sec  ABG - ( 26 Aug 2017 01:38 )  pH: 7.35  /  pCO2: 40    /  pO2: 98    / HCO3: 22    / Base Excess: -3.4  /  SaO2: 98                                   CAPILLARY BLOOD GLUCOSE  114 (26 Aug 2017 17:00)  115 (26 Aug 2017 12:00)  95 (26 Aug 2017 08:00)           CXR: Pending in AM.     I&O's Detail    25 Aug 2017 07:01  -  26 Aug 2017 07:00  --------------------------------------------------------  IN:    Albumin 5%  - 250 mL: 500 mL    norepinephrine Infusion: 160 mL    Oral Fluid: 240 mL    Packed Red Blood Cells: 320 mL    sodium chloride 0.9%.: 190 mL    sodium chloride 0.9%.: 115 mL    Solution: 50 mL    Solution: 500 mL    Solution: 50 mL    Solution: 200 mL    Solution: 200 mL    vasopressin Infusion: 82.5 mL  Total IN: 2607.5 mL    OUT:    Chest Tube: 50 mL    Chest Tube: 260 mL    Chest Tube: 365 mL    Indwelling Catheter - Urethral: 735 mL  Total OUT: 1410 mL    Total NET: 1197.5 mL      26 Aug 2017 07:01  -  27 Aug 2017 01:27  --------------------------------------------------------  IN:    dextrose 5%.: 180 mL    Lactated Ringers IV Bolus: 250 mL    Oral Fluid: 300 mL    sodium chloride 0.9%.: 75 mL    Solution: 250 mL  Total IN: 1055 mL    OUT:    Chest Tube: 200 mL    Indwelling Catheter - Urethral: 555 mL  Total OUT: 755 mL    Total NET: 300 mL        Drug Dosing Weight  Height (cm): 162.56 (24 Aug 2017 06:33)  Weight (kg): 52 (24 Aug 2017 06:33)  BMI (kg/m2): 19.7 (24 Aug 2017 06:33)  BSA (m2): 1.54 (24 Aug 2017 06:33)    MEDICATIONS  (STANDING):  docusate sodium 100 milliGRAM(s) Oral three times a day  sodium chloride 0.9%. 1000 milliLiter(s) (10 mL/Hr) IV Continuous <Continuous>  sodium chloride 0.9%. 1000 milliLiter(s) (5 mL/Hr) IV Continuous <Continuous>  insulin lispro (HumaLOG) corrective regimen sliding scale   SubCutaneous Before meals and at bedtime  dextrose 5%. 1000 milliLiter(s) (50 mL/Hr) IV Continuous <Continuous>  dextrose 50% Injectable 12.5 Gram(s) IV Push once  dextrose 50% Injectable 25 Gram(s) IV Push once  dextrose 50% Injectable 25 Gram(s) IV Push once  polyethylene glycol 3350 17 Gram(s) Oral daily  senna 2 Tablet(s) Oral at bedtime  dextrose 5%. 1000 milliLiter(s) (50 mL/Hr) IV Continuous <Continuous>  pantoprazole  Injectable 40 milliGRAM(s) IV Push daily  psyllium Powder 1 Packet(s) Oral two times a day  bisacodyl Suppository 10 milliGRAM(s) Rectal daily    MEDICATIONS  (PRN):  dextrose Gel 1 Dose(s) Oral once PRN Blood Glucose LESS THAN 70 milliGRAM(s)/deciliter  glucagon  Injectable 1 milliGRAM(s) IntraMuscular once PRN Glucose LESS THAN 70 milligrams/deciliter      Physical Exam  Neuro: AAOx3 in NAD  Pulm: CTA b/l  CV: Irreg / Irreg, positive S1/S2  Abd: NT/ND, positive bowel sounds  Extremities: DP 2+, no pitting edema   Incision(s): sternal wound c/d/i

## 2017-08-27 NOTE — PROGRESS NOTE ADULT - ASSESSMENT
Patient is a 76 year old female with PMH of afib, BCC, HTN, AI, rheumatic fever, MR, ovarian cyst, fibroid and hypertrophic cardiomyopathy.  Patient is now s/p a re-op MVR with myomectomy and bovine patch of atrial septum 8/24.    Patient received 1U PRCB for anemia w/ compromised hemodynamics, after receiving blood, patient is off pressors.  8/26 Pt evaluated by GI for continued nausea and increase in LFT's, which is believed to be mild post op cholestasis and/or passive congestion of liver. CT scan prelim read pending.

## 2017-08-28 DIAGNOSIS — I50.9 HEART FAILURE, UNSPECIFIED: ICD-10-CM

## 2017-08-28 DIAGNOSIS — Z29.9 ENCOUNTER FOR PROPHYLACTIC MEASURES, UNSPECIFIED: ICD-10-CM

## 2017-08-28 DIAGNOSIS — R11.2 NAUSEA WITH VOMITING, UNSPECIFIED: ICD-10-CM

## 2017-08-28 LAB
ALBUMIN SERPL ELPH-MCNC: 3.1 G/DL — LOW (ref 3.3–5.2)
ALP SERPL-CCNC: 64 U/L — SIGNIFICANT CHANGE UP (ref 40–120)
ALT FLD-CCNC: 141 U/L — HIGH
AMYLASE P1 CFR SERPL: 169 U/L — HIGH (ref 36–128)
ANION GAP SERPL CALC-SCNC: 13 MMOL/L — SIGNIFICANT CHANGE UP (ref 5–17)
AST SERPL-CCNC: 125 U/L — HIGH
BILIRUB DIRECT SERPL-MCNC: 0.7 MG/DL — HIGH (ref 0–0.3)
BILIRUB INDIRECT FLD-MCNC: 1.4 MG/DL — HIGH (ref 0.2–1)
BILIRUB SERPL-MCNC: 2.1 MG/DL — HIGH (ref 0.4–2)
BUN SERPL-MCNC: 15 MG/DL — SIGNIFICANT CHANGE UP (ref 8–20)
CALCIUM SERPL-MCNC: 8 MG/DL — LOW (ref 8.6–10.2)
CHLORIDE SERPL-SCNC: 103 MMOL/L — SIGNIFICANT CHANGE UP (ref 98–107)
CO2 SERPL-SCNC: 24 MMOL/L — SIGNIFICANT CHANGE UP (ref 22–29)
CREAT SERPL-MCNC: 0.65 MG/DL — SIGNIFICANT CHANGE UP (ref 0.5–1.3)
GLUCOSE SERPL-MCNC: 106 MG/DL — SIGNIFICANT CHANGE UP (ref 70–115)
HCT VFR BLD CALC: 32.2 % — LOW (ref 37–47)
HGB BLD-MCNC: 10.7 G/DL — LOW (ref 12–16)
INR BLD: 1.29 RATIO — HIGH (ref 0.88–1.16)
LIDOCAIN IGE QN: 123 U/L — HIGH (ref 22–51)
MAGNESIUM SERPL-MCNC: 2 MG/DL — SIGNIFICANT CHANGE UP (ref 1.6–2.6)
MCHC RBC-ENTMCNC: 29.2 PG — SIGNIFICANT CHANGE UP (ref 27–31)
MCHC RBC-ENTMCNC: 33.2 G/DL — SIGNIFICANT CHANGE UP (ref 32–36)
MCV RBC AUTO: 87.7 FL — SIGNIFICANT CHANGE UP (ref 81–99)
PLATELET # BLD AUTO: 85 K/UL — LOW (ref 150–400)
POTASSIUM SERPL-MCNC: 3.9 MMOL/L — SIGNIFICANT CHANGE UP (ref 3.5–5.3)
POTASSIUM SERPL-SCNC: 3.9 MMOL/L — SIGNIFICANT CHANGE UP (ref 3.5–5.3)
PROT SERPL-MCNC: 4.9 G/DL — LOW (ref 6.6–8.7)
PROTHROM AB SERPL-ACNC: 14.3 SEC — HIGH (ref 9.8–12.7)
RBC # BLD: 3.67 M/UL — LOW (ref 4.4–5.2)
RBC # FLD: 16.1 % — HIGH (ref 11–15.6)
SODIUM SERPL-SCNC: 140 MMOL/L — SIGNIFICANT CHANGE UP (ref 135–145)
WBC # BLD: 11.5 K/UL — HIGH (ref 4.8–10.8)
WBC # FLD AUTO: 11.5 K/UL — HIGH (ref 4.8–10.8)

## 2017-08-28 PROCEDURE — 99232 SBSQ HOSP IP/OBS MODERATE 35: CPT

## 2017-08-28 PROCEDURE — 71010: CPT | Mod: 26,76

## 2017-08-28 PROCEDURE — 93010 ELECTROCARDIOGRAM REPORT: CPT

## 2017-08-28 RX ORDER — SODIUM CHLORIDE 9 MG/ML
3 INJECTION INTRAMUSCULAR; INTRAVENOUS; SUBCUTANEOUS EVERY 8 HOURS
Qty: 0 | Refills: 0 | Status: DISCONTINUED | OUTPATIENT
Start: 2017-08-28 | End: 2017-09-02

## 2017-08-28 RX ORDER — POTASSIUM CHLORIDE 20 MEQ
10 PACKET (EA) ORAL
Qty: 0 | Refills: 0 | Status: COMPLETED | OUTPATIENT
Start: 2017-08-28 | End: 2017-08-28

## 2017-08-28 RX ORDER — METOPROLOL TARTRATE 50 MG
5 TABLET ORAL ONCE
Qty: 0 | Refills: 0 | Status: COMPLETED | OUTPATIENT
Start: 2017-08-28 | End: 2017-08-28

## 2017-08-28 RX ORDER — MAGNESIUM SULFATE 500 MG/ML
2 VIAL (ML) INJECTION ONCE
Qty: 0 | Refills: 0 | Status: COMPLETED | OUTPATIENT
Start: 2017-08-28 | End: 2017-08-28

## 2017-08-28 RX ORDER — WARFARIN SODIUM 2.5 MG/1
2.5 TABLET ORAL ONCE
Qty: 0 | Refills: 0 | Status: COMPLETED | OUTPATIENT
Start: 2017-08-28 | End: 2017-08-28

## 2017-08-28 RX ORDER — METOPROLOL TARTRATE 50 MG
5 TABLET ORAL
Qty: 0 | Refills: 0 | Status: COMPLETED | OUTPATIENT
Start: 2017-08-28 | End: 2017-08-28

## 2017-08-28 RX ADMIN — Medication 25 MILLIGRAM(S): at 17:03

## 2017-08-28 RX ADMIN — Medication 100 MILLIGRAM(S): at 21:56

## 2017-08-28 RX ADMIN — Medication 5 MILLIGRAM(S): at 17:40

## 2017-08-28 RX ADMIN — WARFARIN SODIUM 2.5 MILLIGRAM(S): 2.5 TABLET ORAL at 21:55

## 2017-08-28 RX ADMIN — PANTOPRAZOLE SODIUM 40 MILLIGRAM(S): 20 TABLET, DELAYED RELEASE ORAL at 06:45

## 2017-08-28 RX ADMIN — Medication 50 GRAM(S): at 23:35

## 2017-08-28 RX ADMIN — Medication 1 PACKET(S): at 06:45

## 2017-08-28 RX ADMIN — Medication 5 MILLIGRAM(S): at 23:25

## 2017-08-28 RX ADMIN — POLYETHYLENE GLYCOL 3350 17 GRAM(S): 17 POWDER, FOR SOLUTION ORAL at 17:03

## 2017-08-28 RX ADMIN — Medication 50 MILLIEQUIVALENT(S): at 05:00

## 2017-08-28 RX ADMIN — SODIUM CHLORIDE 3 MILLILITER(S): 9 INJECTION INTRAMUSCULAR; INTRAVENOUS; SUBCUTANEOUS at 14:45

## 2017-08-28 RX ADMIN — Medication 5 MILLIGRAM(S): at 23:22

## 2017-08-28 RX ADMIN — AMIODARONE HYDROCHLORIDE 200 MILLIGRAM(S): 400 TABLET ORAL at 06:44

## 2017-08-28 RX ADMIN — Medication 100 MILLIGRAM(S): at 06:44

## 2017-08-28 RX ADMIN — Medication 100 MILLIGRAM(S): at 14:42

## 2017-08-28 RX ADMIN — Medication 5 MILLIGRAM(S): at 23:14

## 2017-08-28 RX ADMIN — Medication 50 MILLIEQUIVALENT(S): at 06:43

## 2017-08-28 RX ADMIN — SODIUM CHLORIDE 3 MILLILITER(S): 9 INJECTION INTRAMUSCULAR; INTRAVENOUS; SUBCUTANEOUS at 22:02

## 2017-08-28 RX ADMIN — SENNA PLUS 2 TABLET(S): 8.6 TABLET ORAL at 21:56

## 2017-08-28 RX ADMIN — Medication 25 MILLIGRAM(S): at 06:44

## 2017-08-28 NOTE — CHART NOTE - NSCHARTNOTEFT_GEN_A_CORE
Patient seen and evaluated bedside.  Chart reviewed.    Assessment made to remove right pleural tube as the tube did not put anything out on this shift.    Chest xray reviewed.  stable apical PTX noted on right; chest tube on water seal for > 10 hours; without airleak.    Chest tube put back on suction to remove tube; significant for serosang drainage; monitored output for 15 minutes; 250 cc fluid drained; decision made to leave chest tube in at this time on suction and to reassess in the afternoon.  After redressing the site; 320 cc in total was noted to have drained from the chest tube.    Patient currently hemodynamically stable.  TO discuss with surgeon in the AM.

## 2017-08-28 NOTE — PROGRESS NOTE ADULT - PROBLEM SELECTOR PLAN 8
s/p pigtail   consider d.c on am secondary to low tube output  small stable ptx noted on CXR, currently stable on water seal with right chest tube from OR   consider d.c on am secondary to low tube output  small stable ptx noted on CXR, currently stable on waterseal

## 2017-08-28 NOTE — PROGRESS NOTE ADULT - SUBJECTIVE AND OBJECTIVE BOX
Pt seen and examined. CT and sono results noted. + fatty liver with underdistention of stomach. Mild gastric wall thickening likely secondary to gastric underdistention rather than gastritis. She has had no further nausea or vomiting and her liver chemistries are essentially unchanged from yesterday.     MEDICATIONS:  MEDICATIONS  (STANDING):  docusate sodium 100 milliGRAM(s) Oral three times a day  dextrose 50% Injectable 12.5 Gram(s) IV Push once  dextrose 50% Injectable 25 Gram(s) IV Push once  dextrose 50% Injectable 25 Gram(s) IV Push once  senna 2 Tablet(s) Oral at bedtime  psyllium Powder 1 Packet(s) Oral two times a day  metoprolol 25 milliGRAM(s) Oral every 12 hours  pantoprazole    Tablet 40 milliGRAM(s) Oral before breakfast  amiodarone    Tablet 200 milliGRAM(s) Oral daily  warfarin 2.5 milliGRAM(s) Oral once  sodium chloride 0.9% lock flush 3 milliLiter(s) IV Push every 8 hours    MEDICATIONS  (PRN):  polyethylene glycol 3350 17 Gram(s) Oral daily PRN Constipation  bisacodyl Suppository 10 milliGRAM(s) Rectal daily PRN Constipation      Allergies    adhesives (Urticaria)  No Known Drug Allergies    Intolerances    [facility] PT (Unknown)      Vital Signs Last 24 Hrs  T(C): 36.7 (28 Aug 2017 08:00), Max: 37.1 (27 Aug 2017 16:00)  T(F): 98.1 (28 Aug 2017 08:00), Max: 98.7 (27 Aug 2017 16:00)  HR: 71 (28 Aug 2017 08:00) (67 - 84)  BP: 133/63 (28 Aug 2017 08:00) (92/59 - 147/87)  BP(mean): 90 (28 Aug 2017 08:00) (71 - 96)  RR: 16 (28 Aug 2017 08:00) (10 - 25)  SpO2: 96% (28 Aug 2017 08:00) (94% - 98%)    08-27 @ 07:01  -  08-28 @ 07:00  --------------------------------------------------------  IN: 1195 mL / OUT: 1090 mL / NET: 105 mL        PHYSICAL EXAM:    General: Well developed; well nourished; in no acute distress  HEENT: MMM, conjunctiva pink and sclera anicteric.  Gastrointestinal: Abdomen: Soft non-tender non-distended; Normal bowel sounds; No hepatosplenomegaly  Skin: Warm and dry. No obvious rash  Neuro: Pt. a + o x 3.    LABS:      CBC Full  -  ( 28 Aug 2017 03:58 )  WBC Count : 11.5 K/uL  Hemoglobin : 10.7 g/dL  Hematocrit : 32.2 %  Platelet Count - Automated : 85 K/uL  Mean Cell Volume : 87.7 fl  Mean Cell Hemoglobin : 29.2 pg  Mean Cell Hemoglobin Concentration : 33.2 g/dL  Auto Neutrophil # : x  Auto Lymphocyte # : x  Auto Monocyte # : x  Auto Eosinophil # : x  Auto Basophil # : x  Auto Neutrophil % : x  Auto Lymphocyte % : x  Auto Monocyte % : x  Auto Eosinophil % : x  Auto Basophil % : x    08-28    140  |  103  |  15.0  ----------------------------<  106  3.9   |  24.0  |  0.65    Ca    8.0<L>      28 Aug 2017 03:58  Mg     2.0     08-28    TPro  4.9<L>  /  Alb  3.1<L>  /  TBili  2.1<H>  /  DBili  0.7<H>  /  AST  125<H>  /  ALT  141<H>  /  AlkPhos  64  08-28    PT/INR - ( 28 Aug 2017 03:58 )   PT: 14.3 sec;   INR: 1.29 ratio         PTT - ( 27 Aug 2017 04:20 )  PTT:35.7 sec                  RADIOLOGY & ADDITIONAL STUDIES (The following images were personally reviewed):

## 2017-08-28 NOTE — PROGRESS NOTE ADULT - PROBLEM SELECTOR PLAN 1
s/p myomectomy  restart Lopressor 25 BID today   currenlty stable off of pressors and ionotropes   ambulate as tolerated, encourage IS and deep breathing   maintain SCD boots   deintensify in AM   consider floor transfer   to discuss with team in am   continue ICU care at this time and continue to ostomize patient

## 2017-08-28 NOTE — PROGRESS NOTE ADULT - PROBLEM SELECTOR PLAN 3
normal sinus rhythm currently  follow up EKG   continue amio and lopressor   Continue coumadin check INR in AM.  patient education

## 2017-08-28 NOTE — PROGRESS NOTE ADULT - PROBLEM SELECTOR PLAN 2
s/p mitral valve replacement.  as above continue coumadin   restart ASA when appropriate   continue to optimize medications

## 2017-08-28 NOTE — PROGRESS NOTE ADULT - SUBJECTIVE AND OBJECTIVE BOX
Subjective:    T(C): 37.1 (08-27-17 @ 16:00), Max: 37.2 (08-27-17 @ 06:00)  HR: 72 (08-28-17 @ 01:00) (67 - 118)  BP: 131/62 (08-28-17 @ 01:00) (101/70 - 147/87)  RR: 23 (08-28-17 @ 01:00) (11 - 25)  SpO2: 97% (08-28-17 @ 01:00) (93% - 98%)  CVP(mm Hg): 19 (08-28-17 @ 01:00) (4 - 25)    Physical Examination; no acute distress   Neuro: alert and oriented, responds to commands, without focal defects   Pulm: clear anteriorly, equal lung expansion, without ronchi, wheezing or rales   Cardiac: +S1S2 RRR without murmur rub or gallop   GI: soft nontender without guarding   Ext: +pulses trace edema, moves all spontaneously and to command     08-27    140  |  105  |  22.0<H>  ----------------------------<  132<H>  4.2   |  24.0  |  0.91    Ca    8.2<L>      27 Aug 2017 04:20  Mg     2.6     08-27    TPro  5.2<L>  /  Alb  3.5  /  TBili  2.0  /  DBili  0.7<H>  /  AST  127<H>  /  ALT  116<H>  /  AlkPhos  44  08-27                        11.2   15.7  )-----------( 101      ( 27 Aug 2017 04:20 )             33.1     PT/INR - ( 27 Aug 2017 04:20 )   PT: 12.5 sec;   INR: 1.13 ratio    PTT - ( 27 Aug 2017 04:20 )  PTT:35.7 sec        CAPILLARY BLOOD GLUCOSE  106 (27 Aug 2017 17:00)  107 (27 Aug 2017 12:00)  124 (27 Aug 2017 08:00)         CXR:  < from: Xray Chest 1 View AP/PA. (08.27.17 @ 15:42) >    Single frontal view of the chest demonstrates small bilateral apical   pneumothorax, unchanged. Right-sided chest tube. Left-sided central   venous catheter. Small bilateral pleural effusions. Mediastinal   sternotomy wires. The cardiomediastinal silhouette is normal. No acute   osseous abnormalities. Overlying EKG leads and wires are noted.    IMPRESSION: Small bilateral apical pneumothoraces, unchanged.    < end of copied text >      I&O's Detail    26 Aug 2017 07:01  -  27 Aug 2017 07:00  --------------------------------------------------------  IN:    dextrose 5%.: 720 mL    Lactated Ringers IV Bolus: 250 mL    Oral Fluid: 300 mL    sodium chloride 0.9%.: 110 mL    Solution: 250 mL  Total IN: 1630 mL    OUT:    Chest Tube: 240 mL    Indwelling Catheter - Urethral: 720 mL  Total OUT: 960 mL    Total NET: 670 mL      27 Aug 2017 07:01  -  28 Aug 2017 03:18  --------------------------------------------------------  IN:    dextrose 5%.: 100 mL    Oral Fluid: 990 mL    sodium chloride 0.9%.: 90 mL  Total IN: 1180 mL    OUT:    Chest Tube: 70 mL    Voided: 700 mL  Total OUT: 770 mL    Total NET: 410 mL      Drug Dosing Weight  Height (cm): 162.56 (24 Aug 2017 06:33)  Weight (kg): 52 (24 Aug 2017 06:33)  BMI (kg/m2): 19.7 (24 Aug 2017 06:33)  BSA (m2): 1.54 (24 Aug 2017 06:33)    MEDICATIONS  (STANDING):  docusate sodium 100 milliGRAM(s) Oral three times a day  sodium chloride 0.9%. 1000 milliLiter(s) (10 mL/Hr) IV Continuous <Continuous>  sodium chloride 0.9%. 1000 milliLiter(s) (5 mL/Hr) IV Continuous <Continuous>  insulin lispro (HumaLOG) corrective regimen sliding scale   SubCutaneous Before meals and at bedtime  dextrose 50% Injectable 12.5 Gram(s) IV Push once  dextrose 50% Injectable 25 Gram(s) IV Push once  dextrose 50% Injectable 25 Gram(s) IV Push once  senna 2 Tablet(s) Oral at bedtime  psyllium Powder 1 Packet(s) Oral two times a day  metoprolol 25 milliGRAM(s) Oral every 12 hours  pantoprazole    Tablet 40 milliGRAM(s) Oral before breakfast  amiodarone    Tablet 200 milliGRAM(s) Oral daily    MEDICATIONS  (PRN):  polyethylene glycol 3350 17 Gram(s) Oral daily PRN Constipation  bisacodyl Suppository 10 milliGRAM(s) Rectal daily PRN Constipation        PAST MEDICAL & SURGICAL HISTORY:  A-fib: 2007 coverted 1/2008  BCC (basal cell carcinoma of skin)  HTN (hypertension), benign  Aortic stenosis  Mitral valve disease  Rheumatic fever: at 11 years of age  Ovarian cyst  Fibroid  BCC (basal cell carcinoma): removed from right neck  S/P cardiac catheterization: 2007  S/P tonsillectomy  S/P MVR (mitral valve replacement): 2007 - bovine  S/P hysterectomy with oophorectomy

## 2017-08-28 NOTE — PROGRESS NOTE ADULT - ASSESSMENT
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    Post operative course complicated by anemia requiring transfusions, atrial fibrillation, metabolic acidosis requiring resuscitation, hypotension requiring resuscitation thrombocytopenia currently noted to be HIT negative, increased LFTs/Amylase in the setting of persistent post operative N/V requiring hydration and NPO status currently resolving,

## 2017-08-29 LAB
ALBUMIN SERPL ELPH-MCNC: 3.2 G/DL — LOW (ref 3.3–5.2)
ALP SERPL-CCNC: 81 U/L — SIGNIFICANT CHANGE UP (ref 40–120)
ALT FLD-CCNC: 240 U/L — HIGH
AMYLASE P1 CFR SERPL: 183 U/L — HIGH (ref 36–128)
ANION GAP SERPL CALC-SCNC: 10 MMOL/L — SIGNIFICANT CHANGE UP (ref 5–17)
APTT BLD: 27.5 SEC — SIGNIFICANT CHANGE UP (ref 27.5–37.4)
AST SERPL-CCNC: 190 U/L — HIGH
BILIRUB SERPL-MCNC: 2.3 MG/DL — HIGH (ref 0.4–2)
BUN SERPL-MCNC: 13 MG/DL — SIGNIFICANT CHANGE UP (ref 8–20)
CALCIUM SERPL-MCNC: 8 MG/DL — LOW (ref 8.6–10.2)
CHLORIDE SERPL-SCNC: 102 MMOL/L — SIGNIFICANT CHANGE UP (ref 98–107)
CO2 SERPL-SCNC: 25 MMOL/L — SIGNIFICANT CHANGE UP (ref 22–29)
CREAT SERPL-MCNC: 0.51 MG/DL — SIGNIFICANT CHANGE UP (ref 0.5–1.3)
GLUCOSE SERPL-MCNC: 101 MG/DL — SIGNIFICANT CHANGE UP (ref 70–115)
HCT VFR BLD CALC: 32.6 % — LOW (ref 37–47)
HGB BLD-MCNC: 10.9 G/DL — LOW (ref 12–16)
INR BLD: 1.25 RATIO — HIGH (ref 0.88–1.16)
LIDOCAIN IGE QN: 178 U/L — HIGH (ref 22–51)
MAGNESIUM SERPL-MCNC: 2.3 MG/DL — SIGNIFICANT CHANGE UP (ref 1.8–2.6)
MCHC RBC-ENTMCNC: 28.9 PG — SIGNIFICANT CHANGE UP (ref 27–31)
MCHC RBC-ENTMCNC: 33.4 G/DL — SIGNIFICANT CHANGE UP (ref 32–36)
MCV RBC AUTO: 86.5 FL — SIGNIFICANT CHANGE UP (ref 81–99)
PLATELET # BLD AUTO: 96 K/UL — LOW (ref 150–400)
POTASSIUM SERPL-MCNC: 3.6 MMOL/L — SIGNIFICANT CHANGE UP (ref 3.5–5.3)
POTASSIUM SERPL-SCNC: 3.6 MMOL/L — SIGNIFICANT CHANGE UP (ref 3.5–5.3)
PROT SERPL-MCNC: 4.9 G/DL — LOW (ref 6.6–8.7)
PROTHROM AB SERPL-ACNC: 13.8 SEC — HIGH (ref 9.8–12.7)
RBC # BLD: 3.77 M/UL — LOW (ref 4.4–5.2)
RBC # FLD: 15.7 % — HIGH (ref 11–15.6)
SODIUM SERPL-SCNC: 137 MMOL/L — SIGNIFICANT CHANGE UP (ref 135–145)
SRA INTERP SER-IMP: SIGNIFICANT CHANGE UP
WBC # BLD: 9.5 K/UL — SIGNIFICANT CHANGE UP (ref 4.8–10.8)
WBC # FLD AUTO: 9.5 K/UL — SIGNIFICANT CHANGE UP (ref 4.8–10.8)

## 2017-08-29 PROCEDURE — 99233 SBSQ HOSP IP/OBS HIGH 50: CPT

## 2017-08-29 PROCEDURE — 93010 ELECTROCARDIOGRAM REPORT: CPT

## 2017-08-29 PROCEDURE — 71010: CPT | Mod: 26,76

## 2017-08-29 RX ORDER — DIGOXIN 250 MCG
0.25 TABLET ORAL ONCE
Qty: 0 | Refills: 0 | Status: COMPLETED | OUTPATIENT
Start: 2017-08-29 | End: 2017-08-29

## 2017-08-29 RX ORDER — FUROSEMIDE 40 MG
20 TABLET ORAL ONCE
Qty: 0 | Refills: 0 | Status: COMPLETED | OUTPATIENT
Start: 2017-08-29 | End: 2017-08-29

## 2017-08-29 RX ORDER — METOPROLOL TARTRATE 50 MG
5 TABLET ORAL ONCE
Qty: 0 | Refills: 0 | Status: COMPLETED | OUTPATIENT
Start: 2017-08-29 | End: 2017-08-29

## 2017-08-29 RX ORDER — WARFARIN SODIUM 2.5 MG/1
2.5 TABLET ORAL ONCE
Qty: 0 | Refills: 0 | Status: COMPLETED | OUTPATIENT
Start: 2017-08-29 | End: 2017-08-29

## 2017-08-29 RX ORDER — POTASSIUM CHLORIDE 20 MEQ
40 PACKET (EA) ORAL ONCE
Qty: 0 | Refills: 0 | Status: COMPLETED | OUTPATIENT
Start: 2017-08-29 | End: 2017-08-29

## 2017-08-29 RX ORDER — DIGOXIN 250 MCG
0.5 TABLET ORAL ONCE
Qty: 0 | Refills: 0 | Status: DISCONTINUED | OUTPATIENT
Start: 2017-08-29 | End: 2017-08-29

## 2017-08-29 RX ORDER — METOPROLOL TARTRATE 50 MG
25 TABLET ORAL THREE TIMES A DAY
Qty: 0 | Refills: 0 | Status: DISCONTINUED | OUTPATIENT
Start: 2017-08-29 | End: 2017-08-29

## 2017-08-29 RX ORDER — ASPIRIN/CALCIUM CARB/MAGNESIUM 324 MG
325 TABLET ORAL DAILY
Qty: 0 | Refills: 0 | Status: DISCONTINUED | OUTPATIENT
Start: 2017-08-30 | End: 2017-09-02

## 2017-08-29 RX ORDER — METOPROLOL TARTRATE 50 MG
50 TABLET ORAL
Qty: 0 | Refills: 0 | Status: DISCONTINUED | OUTPATIENT
Start: 2017-08-29 | End: 2017-09-02

## 2017-08-29 RX ORDER — METOPROLOL TARTRATE 50 MG
25 TABLET ORAL ONCE
Qty: 0 | Refills: 0 | Status: COMPLETED | OUTPATIENT
Start: 2017-08-29 | End: 2017-08-29

## 2017-08-29 RX ADMIN — Medication 20 MILLIGRAM(S): at 17:54

## 2017-08-29 RX ADMIN — SODIUM CHLORIDE 3 MILLILITER(S): 9 INJECTION INTRAMUSCULAR; INTRAVENOUS; SUBCUTANEOUS at 22:00

## 2017-08-29 RX ADMIN — Medication 100 MILLIGRAM(S): at 05:18

## 2017-08-29 RX ADMIN — Medication 5 MILLIGRAM(S): at 05:30

## 2017-08-29 RX ADMIN — Medication 5 MILLIGRAM(S): at 05:45

## 2017-08-29 RX ADMIN — AMIODARONE HYDROCHLORIDE 200 MILLIGRAM(S): 400 TABLET ORAL at 05:18

## 2017-08-29 RX ADMIN — Medication 5 MILLIGRAM(S): at 19:17

## 2017-08-29 RX ADMIN — SENNA PLUS 2 TABLET(S): 8.6 TABLET ORAL at 21:59

## 2017-08-29 RX ADMIN — PANTOPRAZOLE SODIUM 40 MILLIGRAM(S): 20 TABLET, DELAYED RELEASE ORAL at 05:20

## 2017-08-29 RX ADMIN — Medication 5 MILLIGRAM(S): at 19:59

## 2017-08-29 RX ADMIN — Medication 100 MILLIGRAM(S): at 14:26

## 2017-08-29 RX ADMIN — Medication 100 MILLIGRAM(S): at 21:59

## 2017-08-29 RX ADMIN — SODIUM CHLORIDE 3 MILLILITER(S): 9 INJECTION INTRAMUSCULAR; INTRAVENOUS; SUBCUTANEOUS at 14:25

## 2017-08-29 RX ADMIN — Medication 25 MILLIGRAM(S): at 21:58

## 2017-08-29 RX ADMIN — SODIUM CHLORIDE 3 MILLILITER(S): 9 INJECTION INTRAMUSCULAR; INTRAVENOUS; SUBCUTANEOUS at 05:19

## 2017-08-29 RX ADMIN — Medication 40 MILLIEQUIVALENT(S): at 05:18

## 2017-08-29 RX ADMIN — Medication 25 MILLIGRAM(S): at 05:18

## 2017-08-29 RX ADMIN — Medication 25 MILLIGRAM(S): at 14:26

## 2017-08-29 RX ADMIN — Medication 5 MILLIGRAM(S): at 09:32

## 2017-08-29 RX ADMIN — WARFARIN SODIUM 2.5 MILLIGRAM(S): 2.5 TABLET ORAL at 22:00

## 2017-08-29 RX ADMIN — Medication 0.25 MILLIGRAM(S): at 21:58

## 2017-08-29 NOTE — PROGRESS NOTE ADULT - SUBJECTIVE AND OBJECTIVE BOX
76y Female s/p Mitral valve replacement      Subjective: Patient has no acute complaints.     T(C): 36.8 (08-28-17 @ 20:00), Max: 36.9 (08-28-17 @ 05:00)  HR: 74 (08-29-17 @ 02:00) (70 - 139), now out of A-fib and in normal sinus rhythm   BP: 116/59 (08-29-17 @ 02:00) (92/59 - 143/65)  RR: 14 (08-29-17 @ 02:00) (10 - 26)  SpO2: 96% (08-29-17 @ 02:00) (94% - 98%)  CVP(mm Hg): 9 (08-28-17 @ 06:00) (9 - 24)      08-28    140  |  103  |  15.0  ----------------------------<  106  3.9   |  24.0  |  0.65    Ca    8.0<L>      28 Aug 2017 03:58  Mg     2.0     08-28    TPro  4.9<L>  /  Alb  3.1<L>  /  TBili  2.1<H>  /  DBili  0.7<H>  /  AST  125<H>  /  ALT  141<H>  /  AlkPhos  64  08-28                               10.7   11.5  )-----------( 85       ( 28 Aug 2017 03:58 )             32.2        PT/INR - ( 28 Aug 2017 03:58 )   PT: 14.3 sec;   INR: 1.29 ratio         PTT - ( 27 Aug 2017 04:20 )  PTT:35.7 sec             CAPILLARY BLOOD GLUCOSE  94 (28 Aug 2017 08:00)               CXR: < from: Xray Chest 1 View AP/PA. (08.28.17 @ 12:21) >  Right lung is relatively clear. Small right apical   pneumothorax is present.    Left pleural effusion unchanged    < end of copied text >      I&O's Detail    27 Aug 2017 07:01  -  28 Aug 2017 07:00  --------------------------------------------------------  IN:    dextrose 5%.: 100 mL    Oral Fluid: 990 mL    sodium chloride 0.9%: 105 mL  Total IN: 1195 mL    OUT:    Chest Tube: 390 mL    Voided: 700 mL  Total OUT: 1090 mL    Total NET: 105 mL      28 Aug 2017 07:01  -  29 Aug 2017 02:54  --------------------------------------------------------  IN:    Oral Fluid: 1060 mL    Solution: 50 mL  Total IN: 1110 mL    OUT:    Chest Tube: 330 mL    Voided: 1200 mL  Total OUT: 1530 mL    Total NET: -420 mL          MEDICATIONS  (STANDING):  docusate sodium 100 milliGRAM(s) Oral three times a day  senna 2 Tablet(s) Oral at bedtime  psyllium Powder 1 Packet(s) Oral two times a day  pantoprazole    Tablet 40 milliGRAM(s) Oral before breakfast  amiodarone    Tablet 200 milliGRAM(s) Oral daily  sodium chloride 0.9% lock flush 3 milliLiter(s) IV Push every 8 hours  metoprolol Injectable 5 milliGRAM(s) IV Push every 15 minutes  metoprolol 25 milliGRAM(s) Oral three times a day    MEDICATIONS  (PRN):  polyethylene glycol 3350 17 Gram(s) Oral daily PRN Constipation  bisacodyl Suppository 10 milliGRAM(s) Rectal daily PRN Constipation      Physical Exam  Neuro: A+O x 3, non-focal, speech clear and intact  Pulm: CTA, equal bilaterally  CV: RRR, +S1S2  Abd: Belly soft, nontender, no roubound tenderness or peritoneal signs  Ext: NEFF x 4, no edema  Inc: MSI C/D/I/stable w/ dsg

## 2017-08-29 NOTE — PROGRESS NOTE ADULT - PROBLEM SELECTOR PLAN 8
with right chest tube from OR   consider d.c on am secondary to low tube output  small stable ptx noted on CXR, currently stable on waterseal

## 2017-08-29 NOTE — PROGRESS NOTE ADULT - PROBLEM SELECTOR PLAN 2
s/p mitral valve replacement.  as above continue coumadin   restart ASA when appropriate - HIT negative, f/u platelet count in AM.   continue to optimize medications

## 2017-08-29 NOTE — PROGRESS NOTE ADULT - PROBLEM SELECTOR PLAN 1
s/p myomectomy  increase Lopressor to 25 TID today   currenlty stable off of pressors and ionotropes   ambulate as tolerated, encourage IS and deep breathing   maintain SCD boots   deintensify in AM   consider floor transfer   to discuss with team in am

## 2017-08-29 NOTE — PROGRESS NOTE ADULT - ASSESSMENT
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    Post operative course complicated by anemia requiring transfusions, atrial fibrillation, metabolic acidosis requiring resuscitation, hypotension requiring resuscitation thrombocytopenia currently noted to be HIT negative, increased LFTs/Amylase in the setting of persistent post operative N/V requiring hydration and NPO status currently resolving - tolerating a consistant carb, low fat diet

## 2017-08-29 NOTE — PROGRESS NOTE ADULT - ASSESSMENT
Mild transaminitis.  Persistent, slightly worse;  without explanation.  All LFT's were normal on admission.  Benign PE.    Probably secondary to passive congestion during surgery or from anesthesia.  Nothing alarming.  Suggest :  Acute Hepatitis profiles, with HepBsAb and Hep B core AB total.  KARLO;  Ferritin.    OK for transfer out of ICU from  POV.  Continue to trend LFT's.

## 2017-08-29 NOTE — PROGRESS NOTE ADULT - SUBJECTIVE AND OBJECTIVE BOX
Patient seen and examined.  POD #5.  Feels very good.  Last chest tube was removed this morning.  Eating well.  Had bowel movement yesterday.  On Metamucil and miralax.  No overt bleeding.  Denies abdominal pain, or Nausea.  Sitting up.  Comfortable.  No fever.    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  No dyspepsia  No abdominal pain.    No Nausea, vomiting.  No Bleeding.  No hematemesis.   No diarrhea.    No hematochesia.  No weight loss, anorexia.  No edema.      MEDICATIONS  (STANDING):  docusate sodium 100 milliGRAM(s) Oral three times a day  dextrose 50% Injectable 12.5 Gram(s) IV Push once  dextrose 50% Injectable 25 Gram(s) IV Push once  dextrose 50% Injectable 25 Gram(s) IV Push once  senna 2 Tablet(s) Oral at bedtime  psyllium Powder 1 Packet(s) Oral two times a day  pantoprazole    Tablet 40 milliGRAM(s) Oral before breakfast  amiodarone    Tablet 200 milliGRAM(s) Oral daily  sodium chloride 0.9% lock flush 3 milliLiter(s) IV Push every 8 hours  metoprolol 25 milliGRAM(s) Oral three times a day  warfarin 2.5 milliGRAM(s) Oral once    MEDICATIONS  (PRN):  polyethylene glycol 3350 17 Gram(s) Oral daily PRN Constipation  bisacodyl Suppository 10 milliGRAM(s) Rectal daily PRN Constipation      Allergies    adhesives (Urticaria)  No Known Drug Allergies    Intolerances    [facility] PT (Unknown)      Vital Signs Last 24 Hrs  T(C): 36.9 (29 Aug 2017 07:00), Max: 36.9 (29 Aug 2017 07:00)  T(F): 98.5 (29 Aug 2017 07:00), Max: 98.5 (29 Aug 2017 07:00)  HR: 98 (29 Aug 2017 08:00) (70 - 140)  BP: 122/58 (29 Aug 2017 08:00) (94/55 - 143/65)  BP(mean): 83 (29 Aug 2017 08:00) (70 - 98)  RR: 18 (29 Aug 2017 08:00) (10 - 26)  SpO2: 95% (29 Aug 2017 08:00) (94% - 98%)    PHYSICAL EXAM:    GENERAL: NAD, well-groomed, well-developed  HEAD:  Atraumatic, Normocephalic  EYES: EOMI, PERRLA, conjunctiva and sclera clear  ENMT: No tonsillar erythema, exudates, or enlargement; Moist mucous membranes, Good dentition, No lesions  NECK: Supple, No JVD, Normal thyroid  CHEST/LUNG: Clear to percussion bilaterally; No rales, rhonchi, wheezing, or rubs  HEART: Regular rate and rhythm; No murmurs, rubs, or gallops.  Sternotomy  dressing in place.   ABDOMEN: Soft, Nontender, Nondistended; Bowel sounds present  EXTREMITIES:  2+ Peripheral Pulses, No clubbing, cyanosis, or edema  LYMPH: No lymphadenopathy noted  SKIN: No rashes or lesions      LABS:                        10.9   9.5   )-----------( 96       ( 29 Aug 2017 04:07 )             32.6     08-29    137  |  102  |  13.0  ----------------------------<  101  3.6   |  25.0  |  0.51    Ca    8.0<L>      29 Aug 2017 04:07  Mg     2.3     08-29    TPro  4.9<L>  /  Alb  3.2<L>  /  TBili  2.3<H>  /  DBili  x   /  AST  190<H>  /  ALT  240<H>  /  AlkPhos  81  08-29    PT/INR - ( 29 Aug 2017 06:40 )   PT: 13.8 sec;   INR: 1.25 ratio         PTT - ( 29 Aug 2017 06:40 )  PTT:27.5 sec         RADIOLOGY & ADDITIONAL STUDIES:  Sono negative.      CT:  negative:  mild fatty liver.  No bile ducts.  Trace ascites.  tics.

## 2017-08-30 ENCOUNTER — TRANSCRIPTION ENCOUNTER (OUTPATIENT)
Age: 77
End: 2017-08-30

## 2017-08-30 DIAGNOSIS — K59.00 CONSTIPATION, UNSPECIFIED: ICD-10-CM

## 2017-08-30 LAB
ALBUMIN SERPL ELPH-MCNC: 3.3 G/DL — SIGNIFICANT CHANGE UP (ref 3.3–5.2)
ALP SERPL-CCNC: 84 U/L — SIGNIFICANT CHANGE UP (ref 40–120)
ALT FLD-CCNC: 208 U/L — HIGH
AMYLASE P1 CFR SERPL: 182 U/L — HIGH (ref 36–128)
ANION GAP SERPL CALC-SCNC: 12 MMOL/L — SIGNIFICANT CHANGE UP (ref 5–17)
AST SERPL-CCNC: 112 U/L — HIGH
BILIRUB SERPL-MCNC: 2.4 MG/DL — HIGH (ref 0.4–2)
BUN SERPL-MCNC: 11 MG/DL — SIGNIFICANT CHANGE UP (ref 8–20)
CALCIUM SERPL-MCNC: 8 MG/DL — LOW (ref 8.6–10.2)
CHLORIDE SERPL-SCNC: 97 MMOL/L — LOW (ref 98–107)
CO2 SERPL-SCNC: 27 MMOL/L — SIGNIFICANT CHANGE UP (ref 22–29)
CREAT SERPL-MCNC: 0.53 MG/DL — SIGNIFICANT CHANGE UP (ref 0.5–1.3)
GLUCOSE SERPL-MCNC: 110 MG/DL — SIGNIFICANT CHANGE UP (ref 70–115)
HCT VFR BLD CALC: 34.9 % — LOW (ref 37–47)
HGB BLD-MCNC: 11.5 G/DL — LOW (ref 12–16)
INR BLD: 1.43 RATIO — HIGH (ref 0.88–1.16)
LIDOCAIN IGE QN: 237 U/L — HIGH (ref 22–51)
MAGNESIUM SERPL-MCNC: 2.3 MG/DL — SIGNIFICANT CHANGE UP (ref 1.6–2.6)
MCHC RBC-ENTMCNC: 28.7 PG — SIGNIFICANT CHANGE UP (ref 27–31)
MCHC RBC-ENTMCNC: 33 G/DL — SIGNIFICANT CHANGE UP (ref 32–36)
MCV RBC AUTO: 87 FL — SIGNIFICANT CHANGE UP (ref 81–99)
PLATELET # BLD AUTO: 127 K/UL — LOW (ref 150–400)
POTASSIUM SERPL-MCNC: 3.3 MMOL/L — LOW (ref 3.5–5.3)
POTASSIUM SERPL-SCNC: 3.3 MMOL/L — LOW (ref 3.5–5.3)
PROT SERPL-MCNC: 5.3 G/DL — LOW (ref 6.6–8.7)
PROTHROM AB SERPL-ACNC: 15.8 SEC — HIGH (ref 9.8–12.7)
RBC # BLD: 4.01 M/UL — LOW (ref 4.4–5.2)
RBC # FLD: 15.6 % — SIGNIFICANT CHANGE UP (ref 11–15.6)
SODIUM SERPL-SCNC: 136 MMOL/L — SIGNIFICANT CHANGE UP (ref 135–145)
SURGICAL PATHOLOGY FINAL REPORT - CH: SIGNIFICANT CHANGE UP
WBC # BLD: 8.8 K/UL — SIGNIFICANT CHANGE UP (ref 4.8–10.8)
WBC # FLD AUTO: 8.8 K/UL — SIGNIFICANT CHANGE UP (ref 4.8–10.8)

## 2017-08-30 PROCEDURE — 99232 SBSQ HOSP IP/OBS MODERATE 35: CPT

## 2017-08-30 PROCEDURE — 93010 ELECTROCARDIOGRAM REPORT: CPT

## 2017-08-30 PROCEDURE — 99223 1ST HOSP IP/OBS HIGH 75: CPT

## 2017-08-30 PROCEDURE — 71010: CPT | Mod: 26

## 2017-08-30 RX ORDER — SODIUM CHLORIDE 9 MG/ML
200 INJECTION INTRAMUSCULAR; INTRAVENOUS; SUBCUTANEOUS ONCE
Qty: 0 | Refills: 0 | Status: COMPLETED | OUTPATIENT
Start: 2017-08-30 | End: 2017-08-30

## 2017-08-30 RX ORDER — POTASSIUM CHLORIDE 20 MEQ
40 PACKET (EA) ORAL ONCE
Qty: 0 | Refills: 0 | Status: COMPLETED | OUTPATIENT
Start: 2017-08-30 | End: 2017-08-30

## 2017-08-30 RX ORDER — AMIODARONE HYDROCHLORIDE 400 MG/1
200 TABLET ORAL ONCE
Qty: 0 | Refills: 0 | Status: COMPLETED | OUTPATIENT
Start: 2017-08-30 | End: 2017-08-30

## 2017-08-30 RX ORDER — METOPROLOL TARTRATE 50 MG
5 TABLET ORAL ONCE
Qty: 0 | Refills: 0 | Status: COMPLETED | OUTPATIENT
Start: 2017-08-30 | End: 2017-08-30

## 2017-08-30 RX ORDER — AMIODARONE HYDROCHLORIDE 400 MG/1
400 TABLET ORAL
Qty: 0 | Refills: 0 | Status: DISCONTINUED | OUTPATIENT
Start: 2017-08-30 | End: 2017-09-01

## 2017-08-30 RX ORDER — MAGNESIUM SULFATE 500 MG/ML
2 VIAL (ML) INJECTION ONCE
Qty: 0 | Refills: 0 | Status: COMPLETED | OUTPATIENT
Start: 2017-08-30 | End: 2017-08-30

## 2017-08-30 RX ORDER — POTASSIUM CHLORIDE 20 MEQ
10 PACKET (EA) ORAL
Qty: 0 | Refills: 0 | Status: COMPLETED | OUTPATIENT
Start: 2017-08-30 | End: 2017-08-30

## 2017-08-30 RX ORDER — WARFARIN SODIUM 2.5 MG/1
2.5 TABLET ORAL ONCE
Qty: 0 | Refills: 0 | Status: COMPLETED | OUTPATIENT
Start: 2017-08-30 | End: 2017-08-30

## 2017-08-30 RX ADMIN — SODIUM CHLORIDE 3 MILLILITER(S): 9 INJECTION INTRAMUSCULAR; INTRAVENOUS; SUBCUTANEOUS at 13:16

## 2017-08-30 RX ADMIN — Medication 100 MILLIGRAM(S): at 15:25

## 2017-08-30 RX ADMIN — Medication 1 PACKET(S): at 05:16

## 2017-08-30 RX ADMIN — SODIUM CHLORIDE 800 MILLILITER(S): 9 INJECTION INTRAMUSCULAR; INTRAVENOUS; SUBCUTANEOUS at 15:49

## 2017-08-30 RX ADMIN — WARFARIN SODIUM 2.5 MILLIGRAM(S): 2.5 TABLET ORAL at 21:30

## 2017-08-30 RX ADMIN — Medication 50 MILLIGRAM(S): at 21:30

## 2017-08-30 RX ADMIN — Medication 100 MILLIEQUIVALENT(S): at 09:30

## 2017-08-30 RX ADMIN — Medication 100 MILLIGRAM(S): at 05:15

## 2017-08-30 RX ADMIN — Medication 50 MILLIGRAM(S): at 05:18

## 2017-08-30 RX ADMIN — AMIODARONE HYDROCHLORIDE 200 MILLIGRAM(S): 400 TABLET ORAL at 08:41

## 2017-08-30 RX ADMIN — Medication 100 MILLIEQUIVALENT(S): at 05:14

## 2017-08-30 RX ADMIN — Medication 40 MILLIEQUIVALENT(S): at 03:59

## 2017-08-30 RX ADMIN — Medication 5 MILLIGRAM(S): at 01:06

## 2017-08-30 RX ADMIN — Medication 325 MILLIGRAM(S): at 12:03

## 2017-08-30 RX ADMIN — Medication 50 GRAM(S): at 01:06

## 2017-08-30 RX ADMIN — Medication 1 PACKET(S): at 17:49

## 2017-08-30 RX ADMIN — SENNA PLUS 2 TABLET(S): 8.6 TABLET ORAL at 21:30

## 2017-08-30 RX ADMIN — Medication 100 MILLIGRAM(S): at 21:30

## 2017-08-30 RX ADMIN — SODIUM CHLORIDE 3 MILLILITER(S): 9 INJECTION INTRAMUSCULAR; INTRAVENOUS; SUBCUTANEOUS at 21:36

## 2017-08-30 RX ADMIN — AMIODARONE HYDROCHLORIDE 200 MILLIGRAM(S): 400 TABLET ORAL at 05:15

## 2017-08-30 RX ADMIN — SODIUM CHLORIDE 3 MILLILITER(S): 9 INJECTION INTRAMUSCULAR; INTRAVENOUS; SUBCUTANEOUS at 05:13

## 2017-08-30 RX ADMIN — PANTOPRAZOLE SODIUM 40 MILLIGRAM(S): 20 TABLET, DELAYED RELEASE ORAL at 05:15

## 2017-08-30 RX ADMIN — AMIODARONE HYDROCHLORIDE 400 MILLIGRAM(S): 400 TABLET ORAL at 17:49

## 2017-08-30 NOTE — DISCHARGE NOTE ADULT - CARE PROVIDER_API CALL
Terry Kinney), Surgery; Thoracic and Cardiac Surgery  [address] [address]  Phone: [phone]  Fax: [phone]

## 2017-08-30 NOTE — DISCHARGE NOTE ADULT - MEDICATION SUMMARY - MEDICATIONS TO TAKE
I will START or STAY ON the medications listed below when I get home from the hospital:    KAYLA Rolling Walker  -- Indication: For Ambulation    Ecotrin Adult Low Strength 81 mg oral delayed release tablet  -- 1 tab(s) by mouth once a day  -- Indication: For Mitral valve disease    amiodarone 200 mg oral tablet  -- 1 tab(s) by mouth once a day  -- Indication: For Atrial fibrillation, unspecified type    warfarin 2 mg oral tablet  -- 1 tab(s) by mouth once a day (at bedtime)  -- Indication: For Atrial fibrillation, unspecified type    metoprolol tartrate 50 mg oral tablet  -- 1 tab(s) by mouth 2 times a day  -- Indication: For Atrial fibrillation, unspecified type    furosemide 40 mg oral tablet  -- 1 tab(s) by mouth once a day  -- Indication: For fluid overload    docusate sodium 100 mg oral capsule  -- 1 cap(s) by mouth 3 times a day  -- Indication: For Constipation, unspecified constipation type    potassium chloride 20 mEq oral tablet, extended release  -- 1 tab(s) by mouth once a day  -- Indication: For with lasix for hypokalemia    tobramycin 0.3% ophthalmic solution  -- 2 drop(s) to each affected eye every 4 hours  Disp 1 bottle  -- Indication: For Conjunctivitis    pantoprazole 40 mg oral delayed release tablet  -- 1 tab(s) by mouth once a day (before a meal)  -- Indication: For Acid reflux

## 2017-08-30 NOTE — DISCHARGE NOTE ADULT - ADDITIONAL INSTRUCTIONS
1. Take ALL of your medications as ordered. Fill your prescriptions the day you are discharged and take according to the schedule you were given. Continue to take a stool softener if you are taking narcotic pain medications. AVOID medications such as ibuprofen or naproxen if you have had bypass surgery. If you have any questions or are unable to fill the prescriptions, please call the office right away at [phone].  2. Shower daily. Clean all incisions daily while showering with warm water and mild soap, pat dry with a clean towel and do not cover with any dressings unless instructed to. No bathing, swimming in a pool or the ocean until instructed by MD.  DO NOT use creams or lotions on the wound.  3. We advise that you do not drive until instructed by MD.   4. You may not return to work until instructed by MD.   5. Please eat a low fat, low cholesterol, low salt diet. (No added/extra salt)  6. Weigh yourself every day in the morning and record it in the weight log in your red folder. Notify the office of any weight gain more than 2-3 pounds in 24 hours.  **Please LIMIT your fluid intake to less than a liter a day.**  7. Continue breathing exercises several times a day. Continue to use your heart pillow.  8. No heavy lifting nothing greater than 5 pounds until cleared by MD.   9. Call / Notify MD any fever greater than 101.0, any drainage from incisions or if they become red, hot or very tender to the touch.  10. Increase activity as tolerated. Walk indoors and/or outdoors at least 3 times a day.

## 2017-08-30 NOTE — DISCHARGE NOTE ADULT - NS AS ACTIVITY OBS
Do not drive or operate machinery/Walking-Indoors allowed/Walking-Outdoors allowed/Do not make important decisions/Showering allowed/Stairs allowed/No Heavy lifting/straining

## 2017-08-30 NOTE — CONSULT NOTE ADULT - SUBJECTIVE AND OBJECTIVE BOX
77 yo female with pmhx basal cell carcinoma right neck s/p excision, HTN, rheumatic fever s/p bovine MVR 2007/Dr Kinney. Pt was having increasing SOB and CALLAHAN. Angiogram revealed severe subvalvular stenosis and hypertrophied septum. Pt admitted for elective reop sternotomy. She is now POD# 6 s/p MVR(t), septal myomectomy and bovine interatrial septal repair with post PAF with RVR. She recall having post op AF after last valve surgery and was on coumadin for 3 months. She denies CVA, TIA. She denies CP, palpitations or syncope. She does feel nauseous this am since IV potassium was started.  She is currently in AF with controlled VR.    ECG: NSR, prolonged AV delay ~280msec, LAFB, QRS ~100msec (increased AV delay and QRS width since admission ECG 160msec/75msec)  TELE:  PAF with RVR up to 160's, NSR, ST.     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  S/P hysterectomy with oophorectomy    MEDICATIONS:  docusate sodium 100 milliGRAM(s) Oral three times a day  dextrose 50% Injectable 12.5 Gram(s) IV Push once  dextrose 50% Injectable 25 Gram(s) IV Push once  dextrose 50% Injectable 25 Gram(s) IV Push once  senna 2 Tablet(s) Oral at bedtime  psyllium Powder 1 Packet(s) Oral two times a day  polyethylene glycol 3350 17 Gram(s) Oral daily PRN  bisacodyl Suppository 10 milliGRAM(s) Rectal daily PRN  pantoprazole    Tablet 40 milliGRAM(s) Oral before breakfast  sodium chloride 0.9% lock flush 3 milliLiter(s) IV Push every 8 hours  aspirin enteric coated 325 milliGRAM(s) Oral daily  metoprolol 50 milliGRAM(s) Oral two times a day  amiodarone    Tablet 400 milliGRAM(s) Oral two times a day  warfarin 2.5 milliGRAM(s) Oral once    Allergies  adhesives (Urticaria)  No Known Drug Allergies    SOCIAL HISTORY: , performs ADLs, denies drugs, ETOH, smoking    FAMILY HISTORY:  Family history of early CAD (Father)  Family history of colon cancer in mother (Mother)  Family history of heart attack (Mother)  Family history of breast cancer in mother (Mother)      Review of Systems:    CONSTITUTIONAL:+fatigue  EYES: No eye pain, visual disturbances, or discharge  ENMT:  No difficulty hearing, tinnitus, vertigo; No sinus or throat pain  NECK:hx of BCC  BREASTS: No pain, masses, or nipple discharge  RESPIRATORY: +SOB +CALLAHAN  CARDIOVASCULAR: +SOB +CALLAHAN  GASTROINTESTINAL: No abdominal or epigastric pain. No nausea, vomiting, or hematemesis; No diarrhea or constipation. No melena or hematochezia.  GENITOURINARY: No dysuria, frequency, hematuria, or incontinence  NEUROLOGICAL: No headaches, memory loss, loss of strength, numbness, or tremors  SKIN: No itching, burning, rashes, or lesions   LYMPH NODES: No enlarged glands  ENDOCRINE: No heat or cold intolerance; No hair loss  MUSCULOSKELETAL: No joint pain or swelling; No muscle, back, or extremity pain  PSYCHIATRIC: No depression, anxiety, mood swings, or difficulty sleeping  HEME/LYMPH: No easy bruising, or bleeding gums  ALLERY AND IMMUNOLOGIC: No hives or eczema    Vital Signs Last 24 Hrs  T(C): 36.7 (30 Aug 2017 10:00), Max: 36.9 (29 Aug 2017 17:24)  T(F): 98.1 (30 Aug 2017 10:00), Max: 98.5 (29 Aug 2017 17:24)  HR: 102 (30 Aug 2017 11:58) (82 - 157)  BP: 80/42 (30 Aug 2017 11:58) (80/42 - 146/90)  BP(mean): 86 (29 Aug 2017 17:15) (86 - 86)  RR: 17 (30 Aug 2017 10:00) (17 - 22)  SpO2: 98% (30 Aug 2017 05:00) (96% - 98%)    Physical Exam:  Constitutional: AAOx3, NAD, flat affect  Neck: supple, No JVD  Cardiovascular: irregularly irregular , midsternal scar healing well  Pulmonary: decreased at bases b/l, unlabored no rales  Abdomen: +BS, soft NTND  Extremities: +1 b/l edema  Neuro: non focal, speech clear, NEFF x 4    LABS:                        11.5   8.8   )-----------( 127      ( 30 Aug 2017 01:55 )             34.9   08-30    136  |  97<L>  |  11.0  ----------------------------<  110  3.3<L>   |  27.0  |  0.53    Ca    8.0<L>      30 Aug 2017 01:55  Mg     2.3     08-30    TPro  5.3<L>  /  Alb  3.3  /  TBili  2.4<H>  /  DBili  x   /  AST  112<H>  /  ALT  208<H>  /  AlkPhos  84  08-30  LIVER FUNCTIONS - ( 30 Aug 2017 01:55 )  Alb: 3.3 g/dL / Pro: 5.3 g/dL / ALK PHOS: 84 U/L / ALT: 208 U/L / AST: 112 U/L / GGT: x           PT/INR - ( 30 Aug 2017 01:55 )   PT: 15.8 sec;   INR: 1.43 ratio    Thyroid Stimulating Hormone, Serum (08.10.17 @ 14:48)    Thyroid Stimulating Hormone, Serum: 0.98 uIU/mL      EXAM:  ECHO TRANSTHORACIC COMP W DOPP    PROCEDURE DATE:  Aug 10 2017   Summary:   1. Technically limited study.   2. Normal left ventricular internal cavity size.   3. Hyperdynamic global left ventricular systolic function.   4. Left ventricular ejection fraction, by visual estimation, is >75%.   5. Mildly dilated right atrium.   6. There appears to be an LVOT obstruction caused by the basal septum   and the MV apparatus. This acts as a subaortic stenosis. CW velocities of   the AV are 3.71 m/s. LVOT velocities are approx 2.8 m/s. In the   differential diagnosis, a true subaortic membrane is a possibility.   7. The aortic valve appears sclerotic, but due to off axis views not   able to determine valve area.   8. Moderate aortic regurgitation.   9. A bioprosthetic valve is present in the mitral position.  10. Moderate mitral valve regurgitation.  11. Mitral valve mean gradient is 6.0 mmHg consistent with moderate   mitral stenosis.  12. Moderate tricuspid regurgitation.  13. Estimated pulmonary artery systolic pressure is 53.7 mmHg assuming a   right atrial pressure of 15 mmHg, which is consistent with moderate   pulmonary hypertension.  14. There is no evidence of pericardial effusion.  15. Consider ANITA for further evaluation.     Mauricio Rojas MD Electronically signed       A/P;77 yo female with pmhx basal cell carcinoma right neck s/p excision, HTN, rheumatic fever s/p bovine MVR 2007/Dr Kinney. Pt was having increasing SOB and CALLAHAN. Angiogram revealed severe subvalvular stenosis and hypertrophied septum. Pt admitted for elective reop sternotomy. She is now POD# 6 s/p MVR(t), septal myomectomy and bovine interatrial septal repair with post PAF with RVR. She is currently in AF with controlled VR.    1. PAF- episodes of RVR (hemodynamically stable and asymptomatic) , rate controlled at this time  amiodarone started on 8/27/17 with a total of 1gram as of this morning.  would recommend increasing amio to full load protocol  continue lopressor   heparin gtt to bridge to full dose coumadin if cleared by [facility]  monitor for bradyarrhythmias   monitor lfts, tfts  keep k>4.0, mg >2.0  daily ECG  will pino Lombardi

## 2017-08-30 NOTE — PROGRESS NOTE ADULT - PROBLEM SELECTOR PLAN 2
s/p mitral valve replacement.  Continue coumadin for MVR  ASA restarted today, continue to trend CBC  continue to optimize medications

## 2017-08-30 NOTE — PROGRESS NOTE ADULT - PROBLEM SELECTOR PLAN 3
accelerated junctional currently, had been in afib/ aflutter overnight on monitor without ECG to record episode  EP consult for further evaluation pending  Current QTc 453  follow up EKG in AM  Continue amio dose as tolerated  Uptitrate lopressor as needed   Continue coumadin with INR check in AM

## 2017-08-30 NOTE — PROGRESS NOTE ADULT - SUBJECTIVE AND OBJECTIVE BOX
Pt seen and examined f/u nausea and elevated LFTs  This morning she feels good and is tolerting a diabetic diet. She had two BMs last nite and feels even better with no nausea, vomiting or abdominal pain.  chest x ray yesterday showed some vascular pulmonary congestion. The bili today is without change and the transaminasea are down. The overal elevations have always been mild.    REVIEW OF SYSTEMS:    CONSTITUTIONAL: No fever, weight loss, or fatigue  EYES: No eye pain, visual disturbances, or discharge  ENMT:  No difficulty hearing, tinnitus, vertigo; No sinus or throat pain  RESPIRATORY: No cough, wheezing, chills or hemoptysis; No shortness of breath  CARDIOVASCULAR: No chest pain, palpitations, dizziness, or leg swelling  GASTROINTESTINAL: No abdominal or epigastric pain. No nausea, vomiting, or hematemesis; No diarrhea or constipation. No melena or hematochezia.    MEDICATIONS:  MEDICATIONS  (STANDING):  docusate sodium 100 milliGRAM(s) Oral three times a day  dextrose 50% Injectable 12.5 Gram(s) IV Push once  dextrose 50% Injectable 25 Gram(s) IV Push once  dextrose 50% Injectable 25 Gram(s) IV Push once  senna 2 Tablet(s) Oral at bedtime  psyllium Powder 1 Packet(s) Oral two times a day  pantoprazole    Tablet 40 milliGRAM(s) Oral before breakfast  amiodarone    Tablet 200 milliGRAM(s) Oral daily  sodium chloride 0.9% lock flush 3 milliLiter(s) IV Push every 8 hours  aspirin enteric coated 325 milliGRAM(s) Oral daily  metoprolol 50 milliGRAM(s) Oral two times a day    MEDICATIONS  (PRN):  polyethylene glycol 3350 17 Gram(s) Oral daily PRN Constipation  bisacodyl Suppository 10 milliGRAM(s) Rectal daily PRN Constipation      Allergies    adhesives (Urticaria)  No Known Drug Allergies    Intolerances    [facility] PT (Unknown)      Vital Signs Last 24 Hrs  T(C): 36.4 (30 Aug 2017 05:00), Max: 36.9 (29 Aug 2017 17:24)  T(F): 97.6 (30 Aug 2017 05:00), Max: 98.5 (29 Aug 2017 17:24)  HR: 102 (30 Aug 2017 05:00) (74 - 157)  BP: 120/62 (30 Aug 2017 05:00) (96/60 - 146/90)  BP(mean): 86 (29 Aug 2017 17:15) (81 - 90)  RR: 18 (30 Aug 2017 05:00) (12 - 23)  SpO2: 98% (30 Aug 2017 05:00) (95% - 98%)    08-29 @ 07:01  -  08-30 @ 07:00  --------------------------------------------------------  IN: 360 mL / OUT: 2200 mL / NET: -1840 mL        PHYSICAL EXAM:    General: Well developed; well nourished; in no acute distress  HEENT: MMM, conjunctiva and sclera clear  Gastrointestinal:Abdomen: Soft non-tender non-distended; Normal bowel sounds; No hepatosplenomegaly  Extremities: no cyanosis, clubbing or edema.  Skin: Warm and dry. No obvious rash    LABS:      CBC Full  -  ( 30 Aug 2017 01:55 )  WBC Count : 8.8 K/uL  Hemoglobin : 11.5 g/dL  Hematocrit : 34.9 %  Platelet Count - Automated : 127 K/uL  Mean Cell Volume : 87.0 fl  Mean Cell Hemoglobin : 28.7 pg  Mean Cell Hemoglobin Concentration : 33.0 g/dL  Auto Neutrophil # : x  Auto Lymphocyte # : x  Auto Monocyte # : x  Auto Eosinophil # : x  Auto Basophil # : x  Auto Neutrophil % : x  Auto Lymphocyte % : x  Auto Monocyte % : x  Auto Eosinophil % : x  Auto Basophil % : x    08-30    136  |  97<L>  |  11.0  ----------------------------<  110  3.3<L>   |  27.0  |  0.53    Ca    8.0<L>      30 Aug 2017 01:55  Mg     2.3     08-30    TPro  5.3<L>  /  Alb  3.3  /  TBili  2.4<H>  /  DBili  x   /  AST  112<H>  /  ALT  208<H>  /  AlkPhos  84  08-30    PT/INR - ( 30 Aug 2017 01:55 )   PT: 15.8 sec;   INR: 1.43 ratio         PTT - ( 29 Aug 2017 06:40 )  PTT:27.5 sec

## 2017-08-30 NOTE — PROGRESS NOTE ADULT - PROBLEM SELECTOR PLAN 1
s/p myomectomy  Continue Lopressor 50mg BID, currently tolerating  Epicardial pacing wires intact and settings reviewed   currenlty stable off of pressors and inotropes   ambulate as tolerated, encourage IS and deep breathing   maintain SCD boots   to discuss with team in am

## 2017-08-30 NOTE — PROGRESS NOTE ADULT - ASSESSMENT
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    Post operative course complicated by anemia requiring transfusions, atrial fibrillation, metabolic acidosis requiring resuscitation, thrombocytopenia currently noted to be HIT negative, increased LFTs/Amylase in the setting of persistent post operative N/V requiring hydration and NPO status currently resolving - tolerating a consistant carb, low fat diet. Patient has continued tachycardia in the setting of afib vs accelerated junction for which Amiodarone was increased to 400mg PO two times a day with EP consult pending.

## 2017-08-30 NOTE — DISCHARGE NOTE ADULT - HOSPITAL COURSE
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      Post operative course complicated by anemia requiring transfusions, atrial fibrillation, metabolic acidosis requiring resuscitation, thrombocytopenia currently noted to be HIT negative, increased LFTs/Amylase in the setting of persistent post operative N/V requiring hydration and NPO status currently resolving - tolerating a consistent carb, low fat diet. Patient has continued tachycardia in the setting of afib vs accelerated junction for which Amiodarone was increased to 400mg PO two times a day with EP consult called > nothing to do but observe. Levaquin started for elevated WBC on 8/31, amiodarone decreased to 200 mg QD 9/1.

## 2017-08-30 NOTE — DISCHARGE NOTE ADULT - PATIENT PORTAL LINK FT
“You can access the [facility] Patient Portal, offered by [facility], by registering with the following website: [email or web address]”

## 2017-08-30 NOTE — PROGRESS NOTE ADULT - PROBLEM SELECTOR PLAN 2
probably due to vascular hepatic congestion but mild. Nothing further to do from GI standpoint. Would simply repeat LFTs as outpatient in 2-3 weeks and she should f/u with me. Will see while in hospital prn your request only.

## 2017-08-30 NOTE — PROGRESS NOTE ADULT - SUBJECTIVE AND OBJECTIVE BOX
Brief summary:  76 year old female POD #6 Mitral valve replacement with myomectomy and bovine patch of the atrial septum.    Overnight events:  Patient in accelerate junction rhythm. Otherwise asymptomatic and without complaints.     Past Medical History:  Other hypertrophic cardiomyopathy  No h/o HF  Family history of early CAD (Father)  Family history of colon cancer in mother (Mother)  Family history of heart attack (Mother)  Family history of breast cancer in mother (Mother)  Handoff  MEWS Score  A-fib  BCC (basal cell carcinoma of skin)  HTN (hypertension), benign  Aortic stenosis  Mitral valve disease  Rheumatic fever  Ovarian cyst  Fibroid  Subvalvular aortic stenosis  Subvalvular aortic stenosis  Nausea and vomiting, intractability of vomiting not specified, unspecified vomiting type  Pleural effusion due to CHF (congestive heart failure)  Need for prophylactic measure  Elevated LFTs  Nausea after anesthesia, initial encounter  Thrombocytopenia  Transaminitis  Laceration of pharynx, initial encounter  Atrial fibrillation, unspecified type  Mitral valve disease  Hypertrophic cardiomyopathy  Mitral valve replacement  BCC (basal cell carcinoma)  S/P cardiac catheterization  S/P tonsillectomy  S/P MVR (mitral valve replacement)  S/P hysterectomy with oophorectomy  S/P hysterectomy  OTHER HYPERTROPHIC CARDIOMYOPA        docusate sodium 100 milliGRAM(s) Oral three times a day  dextrose 50% Injectable 12.5 Gram(s) IV Push once  dextrose 50% Injectable 25 Gram(s) IV Push once  dextrose 50% Injectable 25 Gram(s) IV Push once  senna 2 Tablet(s) Oral at bedtime  psyllium Powder 1 Packet(s) Oral two times a day  polyethylene glycol 3350 17 Gram(s) Oral daily PRN  bisacodyl Suppository 10 milliGRAM(s) Rectal daily PRN  pantoprazole    Tablet 40 milliGRAM(s) Oral before breakfast  sodium chloride 0.9% lock flush 3 milliLiter(s) IV Push every 8 hours  aspirin enteric coated 325 milliGRAM(s) Oral daily  metoprolol 50 milliGRAM(s) Oral two times a day  amiodarone    Tablet 400 milliGRAM(s) Oral two times a day  warfarin 2.5 milliGRAM(s) Oral once  MEDICATIONS  (PRN):  polyethylene glycol 3350 17 Gram(s) Oral daily PRN Constipation  bisacodyl Suppository 10 milliGRAM(s) Rectal daily PRN Constipation      Daily     Daily Weight in k (30 Aug 2017 05:01)                              11.5   8.8   )-----------( 127      ( 30 Aug 2017 01:55 )             34.9       136  |  97<L>  |  11.0  ----------------------------<  110  3.3<L>   |  27.0  |  0.53    Ca    8.0<L>      30 Aug 2017 01:55  Mg     2.3         TPro  5.3<L>  /  Alb  3.3  /  TBili  2.4<H>  /  DBili  x   /  AST  112<H>  /  ALT  208<H>  /  AlkPhos  84        PT/INR - ( 30 Aug 2017 01:55 )   PT: 15.8 sec;   INR: 1.43 ratio         PTT - ( 29 Aug 2017 06:40 )  PTT:27.5 sec      Objective:  T(C): 36.7 (17 @ 10:00), Max: 36.9 (17 @ 17:24)  HR: 82 (17 @ 10:00) (74 - 157)  BP: 108/69 (17 @ 10:00) (96/60 - 146/90)  RR: 17 (17 @ 10:00) (17 - 22)  SpO2: 98% (17 @ 05:00) (96% - 98%)  Wt(kg): --CAPILLARY BLOOD GLUCOSE  91 (30 Aug 2017 08:00)  100 (30 Aug 2017 03:38)      I&O's Summary    29 Aug 2017 07:01  -  30 Aug 2017 07:00  --------------------------------------------------------  IN: 800 mL / OUT: 2400 mL / NET: -1600 mL        Physical Exam  Neuro: A+O x 3, non-focal, speech clear and intact  Pulm: CTA, equal bilaterally  CV: RRR, +S1S2  Abd: soft, NT, ND, +BS  Ext: +DP Pulses b/l, +PT pulses, no edema  Inc: MSI C/D/I/stable w/o dressing and staples removed

## 2017-08-30 NOTE — DISCHARGE NOTE ADULT - PLAN OF CARE
Recover from Surgery Please follow up with Dr. Kinney in 2 weeks. Call the office for an appointment.  Please follow up with your Cardiologist and Primary Care Physician 2-4 weeks from discharge.    Please call the Cardiothoracic Surgery office at [phone] if you are experiencing any shortness of breath, chest pain, fevers or chills, drainage from the incisions, persistent nausea, vomiting or if you have any questions about your medications. If the symptoms are severe, call 911 and go to the nearest hospital. You can also call [phone] for an emergency [facility] ambulance, which will take you to the closest [facility]. Continue anticoagulation (blood thinner) and amiodarone (antiarrhythmia) Please have your PT/INR levels checked on Monday  and then on Thursday and then weekly, please have all blood work fax to Dr. Vasquez  for your coumadin dosage.    Continue all medication as ordered.

## 2017-08-30 NOTE — CONSULT NOTE ADULT - ATTENDING COMMENTS
Tele with episodes of paroxysmal atrial fibrillation and possibly atrial flutter. At baseline, and on ECG yesterday, pt with sinus rhythm with first degree AV block and LAFB. No bradyarrhythmias have been noted. She reports palpitation occasionally during rapid atrial arrhythmias. Now likely Aflutter with HR stable around 100 bpm, with no associated symptoms.   Agree with full amiodarone load for the postoperative period.   Continue anticoagulation with coumadin.  Trend LFTs while on amiodarone, though PO amiodarone unlikely to cause acute transaminitis.   Likely no role for cardioversion as she has been asymptomatic.

## 2017-08-31 DIAGNOSIS — I05.1 RHEUMATIC MITRAL INSUFFICIENCY: ICD-10-CM

## 2017-08-31 DIAGNOSIS — E87.6 HYPOKALEMIA: ICD-10-CM

## 2017-08-31 LAB
ALBUMIN SERPL ELPH-MCNC: 3 G/DL — LOW (ref 3.3–5.2)
ALP SERPL-CCNC: 78 U/L — SIGNIFICANT CHANGE UP (ref 40–120)
ALT FLD-CCNC: 139 U/L — HIGH
AMYLASE P1 CFR SERPL: 225 U/L — HIGH (ref 36–128)
ANION GAP SERPL CALC-SCNC: 15 MMOL/L — SIGNIFICANT CHANGE UP (ref 5–17)
ANISOCYTOSIS BLD QL: SLIGHT — SIGNIFICANT CHANGE UP
APPEARANCE UR: CLEAR — SIGNIFICANT CHANGE UP
AST SERPL-CCNC: 49 U/L — HIGH
BACTERIA # UR AUTO: ABNORMAL
BASOPHILS # BLD AUTO: 0 K/UL — SIGNIFICANT CHANGE UP (ref 0–0.2)
BASOPHILS NFR BLD AUTO: 0.2 % — SIGNIFICANT CHANGE UP (ref 0–2)
BILIRUB SERPL-MCNC: 2 MG/DL — SIGNIFICANT CHANGE UP (ref 0.4–2)
BILIRUB UR-MCNC: NEGATIVE — SIGNIFICANT CHANGE UP
BUN SERPL-MCNC: 15 MG/DL — SIGNIFICANT CHANGE UP (ref 8–20)
BURR CELLS BLD QL SMEAR: PRESENT — SIGNIFICANT CHANGE UP
CALCIUM SERPL-MCNC: 8.6 MG/DL — SIGNIFICANT CHANGE UP (ref 8.6–10.2)
CHLORIDE SERPL-SCNC: 100 MMOL/L — SIGNIFICANT CHANGE UP (ref 98–107)
CO2 SERPL-SCNC: 24 MMOL/L — SIGNIFICANT CHANGE UP (ref 22–29)
COLOR SPEC: YELLOW — SIGNIFICANT CHANGE UP
COMMENT - URINE: SIGNIFICANT CHANGE UP
CREAT SERPL-MCNC: 0.55 MG/DL — SIGNIFICANT CHANGE UP (ref 0.5–1.3)
DIFF PNL FLD: ABNORMAL
EOSINOPHIL # BLD AUTO: 0.2 K/UL — SIGNIFICANT CHANGE UP (ref 0–0.5)
EOSINOPHIL NFR BLD AUTO: 1.3 % — SIGNIFICANT CHANGE UP (ref 0–6)
EPI CELLS # UR: SIGNIFICANT CHANGE UP
GLUCOSE SERPL-MCNC: 102 MG/DL — SIGNIFICANT CHANGE UP (ref 70–115)
GLUCOSE UR QL: NEGATIVE MG/DL — SIGNIFICANT CHANGE UP
HCT VFR BLD CALC: 37.3 % — SIGNIFICANT CHANGE UP (ref 37–47)
HCT VFR BLD CALC: 38.3 % — SIGNIFICANT CHANGE UP (ref 37–47)
HGB BLD-MCNC: 12.3 G/DL — SIGNIFICANT CHANGE UP (ref 12–16)
HGB BLD-MCNC: 12.9 G/DL — SIGNIFICANT CHANGE UP (ref 12–16)
HYALINE CASTS # UR AUTO: ABNORMAL /LPF
INR BLD: 1.71 RATIO — HIGH (ref 0.88–1.16)
KETONES UR-MCNC: NEGATIVE — SIGNIFICANT CHANGE UP
LEUKOCYTE ESTERASE UR-ACNC: ABNORMAL
LIDOCAIN IGE QN: 433 U/L — HIGH (ref 22–51)
LYMPHOCYTES # BLD AUTO: 1.3 K/UL — SIGNIFICANT CHANGE UP (ref 1–4.8)
LYMPHOCYTES # BLD AUTO: 7.4 % — LOW (ref 20–55)
MACROCYTES BLD QL: SLIGHT — SIGNIFICANT CHANGE UP
MAGNESIUM SERPL-MCNC: 1.8 MG/DL — SIGNIFICANT CHANGE UP (ref 1.6–2.6)
MCHC RBC-ENTMCNC: 29.3 PG — SIGNIFICANT CHANGE UP (ref 27–31)
MCHC RBC-ENTMCNC: 29.4 PG — SIGNIFICANT CHANGE UP (ref 27–31)
MCHC RBC-ENTMCNC: 33 G/DL — SIGNIFICANT CHANGE UP (ref 32–36)
MCHC RBC-ENTMCNC: 33.7 G/DL — SIGNIFICANT CHANGE UP (ref 32–36)
MCV RBC AUTO: 87.2 FL — SIGNIFICANT CHANGE UP (ref 81–99)
MCV RBC AUTO: 88.8 FL — SIGNIFICANT CHANGE UP (ref 81–99)
MONOCYTES # BLD AUTO: 2 K/UL — HIGH (ref 0–0.8)
MONOCYTES NFR BLD AUTO: 11.7 % — HIGH (ref 3–10)
NEUTROPHILS # BLD AUTO: 13.4 K/UL — HIGH (ref 1.8–8)
NEUTROPHILS NFR BLD AUTO: 78.5 % — HIGH (ref 37–73)
NITRITE UR-MCNC: NEGATIVE — SIGNIFICANT CHANGE UP
PH UR: 5 — SIGNIFICANT CHANGE UP (ref 5–8)
PLAT MORPH BLD: NORMAL — SIGNIFICANT CHANGE UP
PLATELET # BLD AUTO: 207 K/UL — SIGNIFICANT CHANGE UP (ref 150–400)
PLATELET # BLD AUTO: 263 K/UL — SIGNIFICANT CHANGE UP (ref 150–400)
POIKILOCYTOSIS BLD QL AUTO: SLIGHT — SIGNIFICANT CHANGE UP
POTASSIUM SERPL-MCNC: 4.1 MMOL/L — SIGNIFICANT CHANGE UP (ref 3.5–5.3)
POTASSIUM SERPL-SCNC: 4.1 MMOL/L — SIGNIFICANT CHANGE UP (ref 3.5–5.3)
PROT SERPL-MCNC: 5.3 G/DL — LOW (ref 6.6–8.7)
PROT UR-MCNC: NEGATIVE MG/DL — SIGNIFICANT CHANGE UP
PROTHROM AB SERPL-ACNC: 19 SEC — HIGH (ref 9.8–12.7)
RBC # BLD: 4.2 M/UL — LOW (ref 4.4–5.2)
RBC # BLD: 4.39 M/UL — LOW (ref 4.4–5.2)
RBC # FLD: 15.9 % — HIGH (ref 11–15.6)
RBC # FLD: 16.2 % — HIGH (ref 11–15.6)
RBC BLD AUTO: ABNORMAL
RBC CASTS # UR COMP ASSIST: SIGNIFICANT CHANGE UP /HPF (ref 0–4)
SODIUM SERPL-SCNC: 139 MMOL/L — SIGNIFICANT CHANGE UP (ref 135–145)
SP GR SPEC: 1.01 — SIGNIFICANT CHANGE UP (ref 1.01–1.02)
UROBILINOGEN FLD QL: NEGATIVE MG/DL — SIGNIFICANT CHANGE UP
WBC # BLD: 14.4 K/UL — HIGH (ref 4.8–10.8)
WBC # BLD: 17 K/UL — HIGH (ref 4.8–10.8)
WBC # FLD AUTO: 14.4 K/UL — HIGH (ref 4.8–10.8)
WBC # FLD AUTO: 17 K/UL — HIGH (ref 4.8–10.8)
WBC UR QL: >50

## 2017-08-31 PROCEDURE — 93010 ELECTROCARDIOGRAM REPORT: CPT

## 2017-08-31 RX ORDER — ERTAPENEM SODIUM 1 G/1
INJECTION, POWDER, LYOPHILIZED, FOR SOLUTION INTRAMUSCULAR; INTRAVENOUS
Qty: 0 | Refills: 0 | Status: DISCONTINUED | OUTPATIENT
Start: 2017-08-31 | End: 2017-09-02

## 2017-08-31 RX ORDER — POTASSIUM CHLORIDE 20 MEQ
20 PACKET (EA) ORAL ONCE
Qty: 0 | Refills: 0 | Status: COMPLETED | OUTPATIENT
Start: 2017-08-31 | End: 2017-08-31

## 2017-08-31 RX ORDER — FUROSEMIDE 40 MG
40 TABLET ORAL DAILY
Qty: 0 | Refills: 0 | Status: DISCONTINUED | OUTPATIENT
Start: 2017-08-31 | End: 2017-09-02

## 2017-08-31 RX ORDER — DIPHENHYDRAMINE HCL 50 MG
25 CAPSULE ORAL EVERY 6 HOURS
Qty: 0 | Refills: 0 | Status: DISCONTINUED | OUTPATIENT
Start: 2017-08-31 | End: 2017-09-02

## 2017-08-31 RX ORDER — MAGNESIUM OXIDE 400 MG ORAL TABLET 241.3 MG
400 TABLET ORAL
Qty: 0 | Refills: 0 | Status: COMPLETED | OUTPATIENT
Start: 2017-08-31 | End: 2017-08-31

## 2017-08-31 RX ORDER — POTASSIUM CHLORIDE 20 MEQ
20 PACKET (EA) ORAL DAILY
Qty: 0 | Refills: 0 | Status: DISCONTINUED | OUTPATIENT
Start: 2017-08-31 | End: 2017-09-02

## 2017-08-31 RX ORDER — ERTAPENEM SODIUM 1 G/1
1000 INJECTION, POWDER, LYOPHILIZED, FOR SOLUTION INTRAMUSCULAR; INTRAVENOUS ONCE
Qty: 0 | Refills: 0 | Status: COMPLETED | OUTPATIENT
Start: 2017-08-31 | End: 2017-08-31

## 2017-08-31 RX ORDER — WARFARIN SODIUM 2.5 MG/1
2.5 TABLET ORAL ONCE
Qty: 0 | Refills: 0 | Status: COMPLETED | OUTPATIENT
Start: 2017-08-31 | End: 2017-08-31

## 2017-08-31 RX ORDER — ERTAPENEM SODIUM 1 G/1
1000 INJECTION, POWDER, LYOPHILIZED, FOR SOLUTION INTRAMUSCULAR; INTRAVENOUS EVERY 24 HOURS
Qty: 0 | Refills: 0 | Status: DISCONTINUED | OUTPATIENT
Start: 2017-09-01 | End: 2017-09-02

## 2017-08-31 RX ADMIN — SODIUM CHLORIDE 3 MILLILITER(S): 9 INJECTION INTRAMUSCULAR; INTRAVENOUS; SUBCUTANEOUS at 18:01

## 2017-08-31 RX ADMIN — Medication 50 MILLIGRAM(S): at 18:02

## 2017-08-31 RX ADMIN — Medication 40 MILLIGRAM(S): at 13:41

## 2017-08-31 RX ADMIN — AMIODARONE HYDROCHLORIDE 400 MILLIGRAM(S): 400 TABLET ORAL at 18:02

## 2017-08-31 RX ADMIN — SODIUM CHLORIDE 3 MILLILITER(S): 9 INJECTION INTRAMUSCULAR; INTRAVENOUS; SUBCUTANEOUS at 05:37

## 2017-08-31 RX ADMIN — Medication 20 MILLIEQUIVALENT(S): at 18:02

## 2017-08-31 RX ADMIN — Medication 100 MILLIGRAM(S): at 05:39

## 2017-08-31 RX ADMIN — ERTAPENEM SODIUM 120 MILLIGRAM(S): 1 INJECTION, POWDER, LYOPHILIZED, FOR SOLUTION INTRAMUSCULAR; INTRAVENOUS at 21:32

## 2017-08-31 RX ADMIN — Medication 25 MILLIGRAM(S): at 21:33

## 2017-08-31 RX ADMIN — Medication 100 MILLIGRAM(S): at 13:41

## 2017-08-31 RX ADMIN — Medication 50 MILLIGRAM(S): at 05:38

## 2017-08-31 RX ADMIN — PANTOPRAZOLE SODIUM 40 MILLIGRAM(S): 20 TABLET, DELAYED RELEASE ORAL at 05:38

## 2017-08-31 RX ADMIN — SODIUM CHLORIDE 3 MILLILITER(S): 9 INJECTION INTRAMUSCULAR; INTRAVENOUS; SUBCUTANEOUS at 21:21

## 2017-08-31 RX ADMIN — MAGNESIUM OXIDE 400 MG ORAL TABLET 400 MILLIGRAM(S): 241.3 TABLET ORAL at 18:02

## 2017-08-31 RX ADMIN — MAGNESIUM OXIDE 400 MG ORAL TABLET 400 MILLIGRAM(S): 241.3 TABLET ORAL at 13:00

## 2017-08-31 RX ADMIN — SENNA PLUS 2 TABLET(S): 8.6 TABLET ORAL at 21:32

## 2017-08-31 RX ADMIN — Medication 325 MILLIGRAM(S): at 13:41

## 2017-08-31 RX ADMIN — Medication 100 MILLIGRAM(S): at 21:32

## 2017-08-31 RX ADMIN — WARFARIN SODIUM 2.5 MILLIGRAM(S): 2.5 TABLET ORAL at 22:27

## 2017-08-31 RX ADMIN — AMIODARONE HYDROCHLORIDE 400 MILLIGRAM(S): 400 TABLET ORAL at 05:38

## 2017-08-31 RX ADMIN — Medication 20 MILLIEQUIVALENT(S): at 13:40

## 2017-08-31 NOTE — PROGRESS NOTE ADULT - ASSESSMENT
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    Post operative course complicated by anemia requiring transfusions, atrial fibrillation, metabolic acidosis requiring resuscitation, thrombocytopenia currently noted to be HIT negative, increased LFTs/Amylase in the setting of persistent post operative N/V requiring hydration and NPO status currently resolving - tolerating a consistant carb, low fat diet. 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      Post operative course complicated by anemia requiring transfusions, atrial fibrillation, metabolic acidosis requiring resuscitation, thrombocytopenia currently noted to be HIT negative, increased LFTs/Amylase in the setting of persistent post operative N/V requiring hydration and NPO status currently resolving - tolerating a consistent carb, low fat diet. Patient has continued tachycardia in the setting of afib vs accelerated junction for which Amiodarone was increased to 400mg PO two times a day with EP consult called.

## 2017-08-31 NOTE — PROGRESS NOTE ADULT - PROBLEM SELECTOR PLAN 2
s/p mitral valve replacement.  Continue coumadin for MVR  ASA restarted today, continue to trend CBC  continue to optimize medications s/p myomectomy  Continue Lopressor 50mg BID, currently tolerating  Epicardial pacing wires intact and settings reviewed   currenlty stable off of pressors and inotropes   ambulate as tolerated, encourage IS and deep breathing   maintain SCD boots

## 2017-08-31 NOTE — PROGRESS NOTE ADULT - PROBLEM SELECTOR PLAN 3
accelerated junctional currently, had been in afib/ aflutter overnight on monitor without ECG to record episode  EP consult for further evaluation pending  Current QTc 453  follow up EKG in AM  Continue amio dose as tolerated  Uptitrate lopressor as needed   Continue coumadin with INR check in AM EP consult: monitor for bradyarrhythmias, continue amiodarone load.  Continue amio dose as tolerated  Continue lopressor as needed   Continue coumadin with INR check in AM

## 2017-08-31 NOTE — PROGRESS NOTE ADULT - SUBJECTIVE AND OBJECTIVE BOX
Subjective: "I'm tired today." patient seated in chair, in NAD.    VITAL SIGNS  Vital Signs Last 24 Hrs  T(C): 37.3 (17 @ 11:32), Max: 37.3 (17 @ 11:32)  T(F): 99.1 (17 @ 11:32), Max: 99.1 (17 @ 11:32)  HR: 102 (17 @ 11:32) (69 - 102)  BP: 97/59 (17 @ 11:32) (97/59 - 119/59)  RR: 18 (17 @ 11:32) (18 - 20)  SpO2: 98% (17 @ 09:58) (96% - 98%)  on RA            Telemetry/Alarms:  AF   LVEF: 75    MEDICATIONS  docusate sodium 100 milliGRAM(s) Oral three times a day  dextrose 50% Injectable 12.5 Gram(s) IV Push once  dextrose 50% Injectable 25 Gram(s) IV Push once  dextrose 50% Injectable 25 Gram(s) IV Push once  senna 2 Tablet(s) Oral at bedtime  psyllium Powder 1 Packet(s) Oral two times a day  polyethylene glycol 3350 17 Gram(s) Oral daily PRN  bisacodyl Suppository 10 milliGRAM(s) Rectal daily PRN  pantoprazole    Tablet 40 milliGRAM(s) Oral before breakfast  sodium chloride 0.9% lock flush 3 milliLiter(s) IV Push every 8 hours  aspirin enteric coated 325 milliGRAM(s) Oral daily  metoprolol 50 milliGRAM(s) Oral two times a day  amiodarone    Tablet 400 milliGRAM(s) Oral two times a day  warfarin 2.5 milliGRAM(s) Oral once  furosemide    Tablet 40 milliGRAM(s) Oral daily  potassium chloride    Tablet ER 20 milliEquivalent(s) Oral daily  diphenhydrAMINE   Capsule 25 milliGRAM(s) Oral every 6 hours PRN  magnesium oxide 400 milliGRAM(s) Oral three times a day with meals      PHYSICAL EXAM  General: well nourished, well developed, no acute distress,   Neurology: alert and oriented x 3, nonfocal, no gross deficits  Respiratory: diminished left base  CV: irregular, no murmurs, S1S2 nl  Abdomen: soft, nontender, nondistended, positive bowel sounds, last bowel movement   Extremities: warm, well perfused. no edema. + DP pulses  Incisions: midline sternal incision, c/d/i. sternum stable. Erythema as described below  Skin: erythematous rash noted on upper chest and back which patient describes as itchy. Areas of erythema on chest surrounding and including upper pole of incision, appears to be concentrated in area under prior adhesive.  Epicardial Wires:  DAGMAR EPM VVI 40       @ 07: @ 07:00  --------------------------------------------------------  IN: 240 mL / OUT: 825 mL / NET: -585 mL     @ 07: @ 14:43  --------------------------------------------------------  IN: 240 mL / OUT: 150 mL / NET: 90 mL         Weights:  Daily     Daily Weight in k.2 (31 Aug 2017 01:00)  Admit Wt: Drug Dosing Weight  Height (cm): 162.56 (24 Aug 2017 06:33)  Weight (kg): 52 (24 Aug 2017 06:33)  BMI (kg/m2): 19.7 (24 Aug 2017 06:33)  BSA (m2): 1.54 (24 Aug 2017 06:33)    LABS      139  |  100  |  15.0  ----------------------------<  102  4.1   |  24.0  |  0.55    Ca    8.6      31 Aug 2017 09:11  Mg     1.8         TPro  5.3<L>  /  Alb  3.0<L>  /  TBili  2.0  /  DBili  x   /  AST  49<H>  /  ALT  139<H>  /  AlkPhos  78                                   12.3   14.4  )-----------( 207      ( 31 Aug 2017 09:11 )             37.3          PT/INR - ( 31 Aug 2017 05:29 )   PT: 19.0 sec;   INR: 1.71 ratio           Bilirubin Total, Serum: 2.0 mg/dL ( @ 09:11)    CAPILLARY BLOOD GLUCOSE  119 (30 Aug 2017 17:06)               Today's CXR: < from: Xray Chest 1 View AP/PA. (17 @ 05:08) >  IMPRESSION: Tiny right apical pneumothorax. Small bilateral pleural   effusions.    < end of copied text >     improved pvc      Today's EKG: < from: 12 Lead ECG (17 @ 07:15) >  sinus rhythm first degree av block  Left axis deviation  Inferior infarct , age undetermined  Anteroseptal infarct , age undetermined  Abnormal ECG    < end of copied text >    (Todays EKG appears AJR vs AF)    PAST MEDICAL & SURGICAL HISTORY:  A-fib:  coverted 2008  BCC (basal cell carcinoma of skin)  HTN (hypertension), benign  Aortic stenosis  Mitral valve disease  Rheumatic fever: at 11 years of age  Ovarian cyst  Fibroid  BCC (basal cell carcinoma): removed from right neck  S/P cardiac catheterization:   S/P tonsillectomy  S/P MVR (mitral valve replacement):  - bovine  S/P hysterectomy with oophorectomy

## 2017-08-31 NOTE — PROGRESS NOTE ADULT - PROBLEM SELECTOR PLAN 1
s/p myomectomy  Continue Lopressor 50mg BID, currently tolerating  Epicardial pacing wires intact and settings reviewed   currenlty stable off of pressors and inotropes   ambulate as tolerated, encourage IS and deep breathing   maintain SCD boots   to discuss with team in am s/p mitral valve replacement.  Continue coumadin for MVR/AF  ASA restarted, continue to trend CBC  continue to optimize medications  Plan to cut wires tomorrow  D/C home tomorrow  Discussed with Dr Kinney

## 2017-08-31 NOTE — PROGRESS NOTE ADULT - PROBLEM SELECTOR PLAN 9
Likely secondary to anesthesia  No resolved  Continue metamucil, Miralax, senna, dulcolax PRN Improved. No complaints  Continue metamucil, Miralax, senna, dulcolax PRN

## 2017-09-01 DIAGNOSIS — D72.829 ELEVATED WHITE BLOOD CELL COUNT, UNSPECIFIED: ICD-10-CM

## 2017-09-01 DIAGNOSIS — R60.0 LOCALIZED EDEMA: ICD-10-CM

## 2017-09-01 DIAGNOSIS — H10.33 UNSPECIFIED ACUTE CONJUNCTIVITIS, BILATERAL: ICD-10-CM

## 2017-09-01 LAB
ALBUMIN SERPL ELPH-MCNC: 3 G/DL — LOW (ref 3.3–5.2)
ALP SERPL-CCNC: 69 U/L — SIGNIFICANT CHANGE UP (ref 40–120)
ALT FLD-CCNC: 102 U/L — HIGH
AMYLASE P1 CFR SERPL: 253 U/L — HIGH (ref 36–128)
ANION GAP SERPL CALC-SCNC: 12 MMOL/L — SIGNIFICANT CHANGE UP (ref 5–17)
AST SERPL-CCNC: 33 U/L — HIGH
BILIRUB SERPL-MCNC: 1.6 MG/DL — SIGNIFICANT CHANGE UP (ref 0.4–2)
BUN SERPL-MCNC: 15 MG/DL — SIGNIFICANT CHANGE UP (ref 8–20)
CALCIUM SERPL-MCNC: 8.4 MG/DL — LOW (ref 8.6–10.2)
CHLORIDE SERPL-SCNC: 100 MMOL/L — SIGNIFICANT CHANGE UP (ref 98–107)
CO2 SERPL-SCNC: 27 MMOL/L — SIGNIFICANT CHANGE UP (ref 22–29)
CREAT SERPL-MCNC: 0.69 MG/DL — SIGNIFICANT CHANGE UP (ref 0.5–1.3)
GLUCOSE SERPL-MCNC: 93 MG/DL — SIGNIFICANT CHANGE UP (ref 70–115)
HCT VFR BLD CALC: 35.7 % — LOW (ref 37–47)
HGB BLD-MCNC: 11.5 G/DL — LOW (ref 12–16)
INR BLD: 2 RATIO — HIGH (ref 0.88–1.16)
LIDOCAIN IGE QN: 478 U/L — HIGH (ref 22–51)
MAGNESIUM SERPL-MCNC: 1.7 MG/DL — SIGNIFICANT CHANGE UP (ref 1.6–2.6)
MCHC RBC-ENTMCNC: 28.8 PG — SIGNIFICANT CHANGE UP (ref 27–31)
MCHC RBC-ENTMCNC: 32.2 G/DL — SIGNIFICANT CHANGE UP (ref 32–36)
MCV RBC AUTO: 89.3 FL — SIGNIFICANT CHANGE UP (ref 81–99)
PHOSPHATE SERPL-MCNC: 2.8 MG/DL — SIGNIFICANT CHANGE UP (ref 2.4–4.7)
PLATELET # BLD AUTO: 248 K/UL — SIGNIFICANT CHANGE UP (ref 150–400)
POTASSIUM SERPL-MCNC: 3.7 MMOL/L — SIGNIFICANT CHANGE UP (ref 3.5–5.3)
POTASSIUM SERPL-SCNC: 3.7 MMOL/L — SIGNIFICANT CHANGE UP (ref 3.5–5.3)
PROT SERPL-MCNC: 5.1 G/DL — LOW (ref 6.6–8.7)
PROTHROM AB SERPL-ACNC: 22.3 SEC — HIGH (ref 9.8–12.7)
RBC # BLD: 4 M/UL — LOW (ref 4.4–5.2)
RBC # FLD: 16.1 % — HIGH (ref 11–15.6)
SODIUM SERPL-SCNC: 139 MMOL/L — SIGNIFICANT CHANGE UP (ref 135–145)
WBC # BLD: 12.2 K/UL — HIGH (ref 4.8–10.8)
WBC # FLD AUTO: 12.2 K/UL — HIGH (ref 4.8–10.8)

## 2017-09-01 PROCEDURE — 71010: CPT | Mod: 26

## 2017-09-01 PROCEDURE — 93971 EXTREMITY STUDY: CPT | Mod: 26,LT

## 2017-09-01 PROCEDURE — 93010 ELECTROCARDIOGRAM REPORT: CPT

## 2017-09-01 RX ORDER — WARFARIN SODIUM 2.5 MG/1
2.5 TABLET ORAL ONCE
Qty: 0 | Refills: 0 | Status: COMPLETED | OUTPATIENT
Start: 2017-09-01 | End: 2017-09-01

## 2017-09-01 RX ORDER — MAGNESIUM SULFATE 500 MG/ML
2 VIAL (ML) INJECTION ONCE
Qty: 0 | Refills: 0 | Status: COMPLETED | OUTPATIENT
Start: 2017-09-01 | End: 2017-09-01

## 2017-09-01 RX ORDER — TOBRAMYCIN 0.3 %
2 DROPS OPHTHALMIC (EYE) EVERY 4 HOURS
Qty: 0 | Refills: 0 | Status: DISCONTINUED | OUTPATIENT
Start: 2017-09-01 | End: 2017-09-02

## 2017-09-01 RX ORDER — AMIODARONE HYDROCHLORIDE 400 MG/1
200 TABLET ORAL DAILY
Qty: 0 | Refills: 0 | Status: DISCONTINUED | OUTPATIENT
Start: 2017-09-02 | End: 2017-09-02

## 2017-09-01 RX ORDER — POTASSIUM CHLORIDE 20 MEQ
40 PACKET (EA) ORAL ONCE
Qty: 0 | Refills: 0 | Status: COMPLETED | OUTPATIENT
Start: 2017-09-01 | End: 2017-09-01

## 2017-09-01 RX ADMIN — SODIUM CHLORIDE 3 MILLILITER(S): 9 INJECTION INTRAMUSCULAR; INTRAVENOUS; SUBCUTANEOUS at 21:16

## 2017-09-01 RX ADMIN — Medication 50 MILLIGRAM(S): at 05:46

## 2017-09-01 RX ADMIN — Medication 50 GRAM(S): at 08:53

## 2017-09-01 RX ADMIN — Medication 40 MILLIEQUIVALENT(S): at 08:53

## 2017-09-01 RX ADMIN — SODIUM CHLORIDE 3 MILLILITER(S): 9 INJECTION INTRAMUSCULAR; INTRAVENOUS; SUBCUTANEOUS at 13:56

## 2017-09-01 RX ADMIN — Medication 20 MILLIEQUIVALENT(S): at 11:32

## 2017-09-01 RX ADMIN — Medication 40 MILLIGRAM(S): at 05:46

## 2017-09-01 RX ADMIN — Medication 100 MILLIGRAM(S): at 21:19

## 2017-09-01 RX ADMIN — Medication 100 MILLIGRAM(S): at 05:46

## 2017-09-01 RX ADMIN — Medication 1 PACKET(S): at 17:47

## 2017-09-01 RX ADMIN — Medication 100 MILLIGRAM(S): at 13:56

## 2017-09-01 RX ADMIN — Medication 2 DROP(S): at 17:47

## 2017-09-01 RX ADMIN — SENNA PLUS 2 TABLET(S): 8.6 TABLET ORAL at 21:19

## 2017-09-01 RX ADMIN — Medication 325 MILLIGRAM(S): at 11:28

## 2017-09-01 RX ADMIN — SODIUM CHLORIDE 3 MILLILITER(S): 9 INJECTION INTRAMUSCULAR; INTRAVENOUS; SUBCUTANEOUS at 05:47

## 2017-09-01 RX ADMIN — Medication 2 DROP(S): at 11:28

## 2017-09-01 RX ADMIN — WARFARIN SODIUM 2.5 MILLIGRAM(S): 2.5 TABLET ORAL at 21:18

## 2017-09-01 RX ADMIN — PANTOPRAZOLE SODIUM 40 MILLIGRAM(S): 20 TABLET, DELAYED RELEASE ORAL at 05:46

## 2017-09-01 RX ADMIN — Medication 2 DROP(S): at 21:19

## 2017-09-01 RX ADMIN — AMIODARONE HYDROCHLORIDE 400 MILLIGRAM(S): 400 TABLET ORAL at 05:46

## 2017-09-01 RX ADMIN — Medication 50 MILLIGRAM(S): at 17:47

## 2017-09-01 RX ADMIN — ERTAPENEM SODIUM 120 MILLIGRAM(S): 1 INJECTION, POWDER, LYOPHILIZED, FOR SOLUTION INTRAMUSCULAR; INTRAVENOUS at 20:23

## 2017-09-01 NOTE — CONSULT NOTE ADULT - SUBJECTIVE AND OBJECTIVE BOX
Vascular Attending:  Dr Heide Robb      HPI:  77 y/o female with CAD and previous mitral valve replacement in  now has new on set SOBOE and fatigue , angiogram showed sever subvalvular stenosis and hypertrophied septum patient now presents for reoperative sternotomy septal myomectomy possible mitral valve repair (10 Aug 2017 13:35) S/P MVR now noted with L hand discoloration, swelling after IV infiltrate and sono which revealed L radial artery occlusion      PAST MEDICAL & SURGICAL HISTORY:  A-fib:  coverted 2008  BCC (basal cell carcinoma of skin)  HTN (hypertension), benign  Aortic stenosis  Mitral valve disease  Rheumatic fever: at 11 years of age  Ovarian cyst  Fibroid  BCC (basal cell carcinoma): removed from right neck  S/P cardiac catheterization:   S/P tonsillectomy  S/P MVR (mitral valve replacement):  - bovine  S/P hysterectomy with oophorectomy      REVIEW OF SYSTEMS    General: fatigue	  Respiratory and Thorax: denies SOB	  Cardiovascular:	+ incisional discomfort but no anginal symptoms  Gastrointestinal:	denies N/V  Musculoskeletal: LUE swelling, discomfort	      MEDICATIONS  (STANDING):  docusate sodium 100 milliGRAM(s) Oral three times a day  dextrose 50% Injectable 12.5 Gram(s) IV Push once  dextrose 50% Injectable 25 Gram(s) IV Push once  dextrose 50% Injectable 25 Gram(s) IV Push once  senna 2 Tablet(s) Oral at bedtime  psyllium Powder 1 Packet(s) Oral two times a day  pantoprazole    Tablet 40 milliGRAM(s) Oral before breakfast  sodium chloride 0.9% lock flush 3 milliLiter(s) IV Push every 8 hours  aspirin enteric coated 325 milliGRAM(s) Oral daily  metoprolol 50 milliGRAM(s) Oral two times a day  furosemide    Tablet 40 milliGRAM(s) Oral daily  potassium chloride    Tablet ER 20 milliEquivalent(s) Oral daily  levoFLOXacin  Tablet 500 milliGRAM(s) Oral every 24 hours  ertapenem  IVPB 1000 milliGRAM(s) IV Intermittent every 24 hours  ertapenem  IVPB   IV Intermittent   warfarin 2.5 milliGRAM(s) Oral once  tobramycin 0.3% Solution 2 Drop(s) Both EYES every 4 hours    MEDICATIONS  (PRN):  polyethylene glycol 3350 17 Gram(s) Oral daily PRN Constipation  bisacodyl Suppository 10 milliGRAM(s) Rectal daily PRN Constipation  diphenhydrAMINE   Capsule 25 milliGRAM(s) Oral every 6 hours PRN Rash and/or Itching      Allergies: No Known Drug Allergies  adhesives (Urticaria)    SOCIAL HISTORY: , nonsmoker      Vital Signs Last 24 Hrs  T(C): 36.7 (01 Sep 2017 10:00), Max: 37.1 (31 Aug 2017 21:24)  T(F): 98.1 (01 Sep 2017 10:00), Max: 98.7 (31 Aug 2017 21:24)  HR: 65 (01 Sep 2017 10:00) (65 - 110)  BP: 101/54 (01 Sep 2017 10:00) (100/56 - 113/56)  BP(mean): --  RR: 17 (01 Sep 2017 10:00) (17 - 19)  SpO2: 96% (01 Sep 2017 05:01) (96% - 98%)    PHYSICAL EXAM:    Constitutional: Frail elderly F in NAD  Neck: Old L neck ecchymosis  Respiratory: CTA diminished at the bases  Cardiovascular: normal S1, S2  Gastrointestinal: soft, ND, NT, + BS  Extremities: LUE with tr edema, erythema at AC fossa no purulent draiange at site of old IV, L hand swelling dorsum with mild erythema from old IV site, enamorado ecchymosis noted and radial ecchymosis and hematoma old radial Carney site.  No palp or doppler radial  +Ulna pulse  + enamorado arch signal  + sensory and motor intact L hand, cap refill WNL    LABS:                        11.5   12.2  )-----------( 248      ( 01 Sep 2017 06:08 )             35.7     09    139  |  100  |  15.0  ----------------------------<  93  3.7   |  27.0  |  0.69    Ca    8.4<L>      01 Sep 2017 06:08  Phos  2.8       Mg     1.7         TPro  5.1<L>  /  Alb  3.0<L>  /  TBili  1.6  /  DBili  x   /  AST  33<H>  /  ALT  102<H>  /  AlkPhos  69      PT/INR - ( 01 Sep 2017 06:08 )   PT: 22.3 sec;   INR: 2.00 ratio           Urinalysis Basic - ( 31 Aug 2017 22:29 )    Color: Yellow / Appearance: Clear / S.010 / pH: x  Gluc: x / Ketone: Negative  / Bili: Negative / Urobili: Negative mg/dL   Blood: x / Protein: Negative mg/dL / Nitrite: Negative   Leuk Esterase: Small / RBC: 0-2 /HPF / WBC >50   Sq Epi: x / Non Sq Epi: Few / Bacteria: x        RADIOLOGY & ADDITIONAL STUDIES  < from: US Duplex Venous Upper Ext Ltd, Left (17 @ 13:38) >  EXAM:  US DPLX UPR EXT VEINS LTD LT                          PROCEDURE DATE:  2017          INTERPRETATION:  CLINICAL INFORMATION: Left upper extremity edema for one   day. Cold and blue left hand, status post open heart surgery 8 days ago   with possible arterial stick.    COMPARISON: None available.    TECHNIQUE: Duplex sonography of the LEFT UPPER extremity with color and   spectral Doppler, with and without compression.      FINDINGS:    Nonocclusive adherent thrombus in the left internal jugular vein.   Superficial thrombophlebitis in the left cephalic and basilic veins.   Incidentally noted near-complete occlusion of the left radial artery. The   left ulnar and brachial arteries are patent.    The left subclavian, axillary, andbrachial veins are patent and   compressible where applicable.     IMPRESSION:     1.  Nonocclusive adherent thrombus in the left internal jugular vein.  2.  Superficial thrombophlebitis in the left cephalic and basilic veins.  3.  Incidentally near complete occlusion of the left radial artery.    < end of copied text >    Impression and Plan: 77 y/o female with CAD and previous mitral valve replacement in  now S/P re-op MVR with myomectomy and bovine patch of atrial septum. Noted with swelling LUE after infiltrated IV's and incidental finding of occluded L radial artery  Pt without any pain, sensory or motor complaints with patent ulna and brachial artery circulation  No vascular surgery intervention indicated  Enamorado discoloration ecchymosis  Elevate LUE for comfort  Cont neurovascular checks  Reconsult as needed  Discussed with Dr Heide Robb

## 2017-09-01 NOTE — PROGRESS NOTE ADULT - PROBLEM SELECTOR PLAN 1
s/p mitral valve replacement.  Continue coumadin for MVR/AF  ASA restarted, continue to trend CBC.  Encourage CDBE/IS and increased mobilization. s/p mitral valve replacement.  Continue coumadin for MVR/AF  ASA restarted, continue to trend CBC.  Encourage CDBE/IS and increased mobilization.  Maintain PW.  Cut tomorrow.

## 2017-09-01 NOTE — CHART NOTE - NSCHARTNOTEFT_GEN_A_CORE
Source: Patient [x ]  Family [ ]   other [ ]    Pt with 2+ mild edema    Current Diet: DASH/TLC    Patient reports [ ] nausea  [ ] vomiting [ ] diarrhea [ ] constipation  [ ]chewing problems [ ] swallowing issues  [ ] other:     PO intake:  < 50% [ ]   50-75%  [x ]   %  [ ]  other :    Source for PO intake [ x] Patient [ ] family [ ] chart [ ] staff [ ] other    Current Weight: 62.1    % Weight Change - 10kg wt gain since admission    Pertinent Medications: MEDICATIONS  (STANDING):  docusate sodium 100 milliGRAM(s) Oral three times a day  dextrose 50% Injectable 12.5 Gram(s) IV Push once  dextrose 50% Injectable 25 Gram(s) IV Push once  dextrose 50% Injectable 25 Gram(s) IV Push once  senna 2 Tablet(s) Oral at bedtime  psyllium Powder 1 Packet(s) Oral two times a day  pantoprazole    Tablet 40 milliGRAM(s) Oral before breakfast  sodium chloride 0.9% lock flush 3 milliLiter(s) IV Push every 8 hours  aspirin enteric coated 325 milliGRAM(s) Oral daily  metoprolol 50 milliGRAM(s) Oral two times a day  furosemide    Tablet 40 milliGRAM(s) Oral daily  potassium chloride    Tablet ER 20 milliEquivalent(s) Oral daily  levoFLOXacin  Tablet 500 milliGRAM(s) Oral every 24 hours  ertapenem  IVPB 1000 milliGRAM(s) IV Intermittent every 24 hours  ertapenem  IVPB   IV Intermittent   warfarin 2.5 milliGRAM(s) Oral once  tobramycin 0.3% Solution 2 Drop(s) Both EYES every 4 hours    MEDICATIONS  (PRN):  polyethylene glycol 3350 17 Gram(s) Oral daily PRN Constipation  bisacodyl Suppository 10 milliGRAM(s) Rectal daily PRN Constipation  diphenhydrAMINE   Capsule 25 milliGRAM(s) Oral every 6 hours PRN Rash and/or Itching    Pertinent Labs: CBC Full  -  ( 01 Sep 2017 06:08 )  WBC Count : 12.2 K/uL  Hemoglobin : 11.5 g/dL  Hematocrit : 35.7 %  Platelet Count - Automated : 248 K/uL  Mean Cell Volume : 89.3 fl  Mean Cell Hemoglobin : 28.8 pg  Mean Cell Hemoglobin Concentration : 32.2 g/dL  09-01 Na139 mmol/L Glu 93 mg/dL K+ 3.7 mmol/L Cr  0.69 mg/dL BUN 15.0 mg/dL Phos 2.8 mg/dL Alb 3.0 g/dL<L>     Skin: sx incision chest    Nutrition focused physical exam conducted - found signs of malnutrition [ ]absent [ ]present    Subcutaneous fat loss: [ ] Orbital fat pads region, [ ]Buccal fat region, [ ]Triceps region,  [ ]Ribs region    Muscle wasting: [ ]Temples region, [ ]Clavicle region, [ ]Shoulder region, [ ]Scapula region, [ ]Interosseous region,  [ ]thigh region, [ ]Calf region    Estimated Needs:   [x ] no change since previous assessment  [ ] recalculated:     Current Nutrition Diagnosis: Pt remains at nutrition risk secondary to increased nutrient needs related to increased phsiologic demand as evidenced by pt s/p mitral valve replacement.    Recommendations: MVI, Vit C 500mg daily    Monitoring and Evaluation:   [x ] PO intake [ x] Tolerance to diet prescription [X] Weights  [X] Follow up per protocol [X] Labs: Source: Patient [x ]  Family [ ]   other [ ]    Aware pt with 2+ mild edema since admission.  Pt with decreased po intake at meals, agreeable to try Ensure supplements.    Current Diet: DASH/TLC    Patient reports [ ] nausea  [ ] vomiting [ ] diarrhea [ ] constipation  [ ]chewing problems [ ] swallowing issues  [ x] other: decrease appetite    PO intake:  < 50% [x ]   50-75%  [ ]   %  [ ]  other :    Source for PO intake [ x] Patient [ ] family [ ] chart [ ] staff [ ] other    Current Weight: 62.1kg    % Weight Change - 10kg wt gain since admission-- question accuracy.  Pt states her normal wt is 114#.    Pertinent Medications: MEDICATIONS  (STANDING):  docusate sodium 100 milliGRAM(s) Oral three times a day  dextrose 50% Injectable 12.5 Gram(s) IV Push once  dextrose 50% Injectable 25 Gram(s) IV Push once  dextrose 50% Injectable 25 Gram(s) IV Push once  senna 2 Tablet(s) Oral at bedtime  psyllium Powder 1 Packet(s) Oral two times a day  pantoprazole    Tablet 40 milliGRAM(s) Oral before breakfast  sodium chloride 0.9% lock flush 3 milliLiter(s) IV Push every 8 hours  aspirin enteric coated 325 milliGRAM(s) Oral daily  metoprolol 50 milliGRAM(s) Oral two times a day  furosemide    Tablet 40 milliGRAM(s) Oral daily  potassium chloride    Tablet ER 20 milliEquivalent(s) Oral daily  levoFLOXacin  Tablet 500 milliGRAM(s) Oral every 24 hours  ertapenem  IVPB 1000 milliGRAM(s) IV Intermittent every 24 hours  ertapenem  IVPB   IV Intermittent   warfarin 2.5 milliGRAM(s) Oral once  tobramycin 0.3% Solution 2 Drop(s) Both EYES every 4 hours    MEDICATIONS  (PRN):  polyethylene glycol 3350 17 Gram(s) Oral daily PRN Constipation  bisacodyl Suppository 10 milliGRAM(s) Rectal daily PRN Constipation  diphenhydrAMINE   Capsule 25 milliGRAM(s) Oral every 6 hours PRN Rash and/or Itching    Pertinent Labs: CBC Full  -  ( 01 Sep 2017 06:08 )  WBC Count : 12.2 K/uL  Hemoglobin : 11.5 g/dL  Hematocrit : 35.7 %  Platelet Count - Automated : 248 K/uL  Mean Cell Volume : 89.3 fl  Mean Cell Hemoglobin : 28.8 pg  Mean Cell Hemoglobin Concentration : 32.2 g/dL  09-01 Na139 mmol/L Glu 93 mg/dL K+ 3.7 mmol/L Cr  0.69 mg/dL BUN 15.0 mg/dL Phos 2.8 mg/dL Alb 3.0 g/dL<L>     Skin: sx incision chest    Nutrition focused physical exam conducted - found signs of malnutrition [x ]absent [ ]present    Subcutaneous fat loss: [ ] Orbital fat pads region, [ ]Buccal fat region, [ ]Triceps region,  [ ]Ribs region    Muscle wasting: [ ]Temples region, [ ]Clavicle region, [ ]Shoulder region, [ ]Scapula region, [ ]Interosseous region,  [ ]thigh region, [ ]Calf region    Estimated Needs:   [x ] no change since previous assessment  [ ] recalculated:     Current Nutrition Diagnosis: Pt remains at nutrition risk secondary to inadequate oral intake related to decreased appetite as evidenced by pt reports po intake slightly <50% at meals.    Recommendations: 8 oz. Ensure Enlive tid   MVI daily  Vit C 500mg daily    Monitoring and Evaluation:   [x ] PO intake [ x] Tolerance to diet prescription [X] Weights  [X] Follow up per protocol [X] Labs:

## 2017-09-01 NOTE — PROGRESS NOTE ADULT - PROBLEM SELECTOR PLAN 8
Therapeutic on Coumadin.  Maintain SCD boots and encourage ambulation.  Continue Protonix for GI prophylaxis. WBC's trending down.  (12.2 today)  Continue PO Levaquin for a total of 5 days per Dr. Kinney.

## 2017-09-01 NOTE — PHARMACY COMMUNICATION NOTE - COMMENTS
clarifeid with samir so. surgeon and prescriber aware of drug interaction and is ok to give. cbarto 8/31/17 6pm
Contraindication due to qt prolongation. Spoke with Kim PEACOCK and she is aware of interaction. Patient being monitored.

## 2017-09-01 NOTE — PROGRESS NOTE ADULT - PROBLEM SELECTOR PLAN 2
s/p myomectomy  Continue Lopressor 50mg BID, currently tolerating  Epicardial pacing wires intact and settings reviewed

## 2017-09-01 NOTE — PROGRESS NOTE ADULT - PROBLEM SELECTOR PLAN 3
EP consult: monitor for bradyarrhythmias.  Amiodarone decreased to 200mg daily as per Dr. Kinney.  Continue lopressor.  Continue coumadin with INR check in AM.  TTE today to r/o pericardial effusion (therapeutic INR today).

## 2017-09-01 NOTE — PROGRESS NOTE ADULT - SUBJECTIVE AND OBJECTIVE BOX
Subjective: "I don't feel so great today."  Sitting up in bed.  Denies SOB or CP.  NAD noted.      Tele:                           T(F): 98.1 (17 @ 10:00), Max: 98.7 (17 @ 21:24)  HR: 65 (17 @ 10:00) (65 - 110)  BP: 101/54 (17 @ 10:00) (100/56 - 113/56)  RR: 17 (17 @ 10:00) (17 - 19)  SpO2: 96% (17 @ 05:01) (96% - 98%)  Wt(kg): --          Daily     Daily Weight in k.1 (01 Sep 2017 06:00)    LV EF:    adhesives (Urticaria)  No Known Drug Allergies  [facility] PT (Unknown)          139  |  100  |  15.0  ----------------------------<  93  3.7   |  27.0  |  0.69    Ca    8.4<L>      01 Sep 2017 06:08  Phos  2.8       Mg     1.7         TPro  5.1<L>  /  Alb  3.0<L>  /  TBili  1.6  /  DBili  x   /  AST  33<H>  /  ALT  102<H>  /  AlkPhos  69                                 11.5   12.2  )-----------( 248      ( 01 Sep 2017 06:08 )             35.7        PT/INR - ( 01 Sep 2017 06:08 )   PT: 22.3 sec;   INR: 2.00 ratio                CAPILLARY BLOOD GLUCOSE               CXR:    I&O's Detail    31 Aug 2017 07:  -  01 Sep 2017 07:00  --------------------------------------------------------  IN:    Oral Fluid: 240 mL  Total IN: 240 mL    OUT:    Voided: 575 mL  Total OUT: 575 mL    Total NET: -335 mL      01 Sep 2017 07:  -  01 Sep 2017 14:03  --------------------------------------------------------  IN:    Oral Fluid: 120 mL  Total IN: 120 mL    OUT:    Voided: 250 mL  Total OUT: 250 mL    Total NET: -130 mL          CHEST TUBE:  [ ] YES [ ] NO  OUTPUT:     per 24 hours    AIR LEAKS:  [ ] YES [ ] NO      BETSEY DRAIN:   [ ] YES [ ] NO  OUTPUT:     per 24 hours    EPICARDIAL WIRES:  [ ] YES [ ] NO      BOWEL MOVEMENT:  [ ] YES [ ] NO        Active Medications:  docusate sodium 100 milliGRAM(s) Oral three times a day  dextrose 50% Injectable 12.5 Gram(s) IV Push once  dextrose 50% Injectable 25 Gram(s) IV Push once  dextrose 50% Injectable 25 Gram(s) IV Push once  senna 2 Tablet(s) Oral at bedtime  psyllium Powder 1 Packet(s) Oral two times a day  polyethylene glycol 3350 17 Gram(s) Oral daily PRN  bisacodyl Suppository 10 milliGRAM(s) Rectal daily PRN  pantoprazole    Tablet 40 milliGRAM(s) Oral before breakfast  sodium chloride 0.9% lock flush 3 milliLiter(s) IV Push every 8 hours  aspirin enteric coated 325 milliGRAM(s) Oral daily  metoprolol 50 milliGRAM(s) Oral two times a day  furosemide    Tablet 40 milliGRAM(s) Oral daily  potassium chloride    Tablet ER 20 milliEquivalent(s) Oral daily  diphenhydrAMINE   Capsule 25 milliGRAM(s) Oral every 6 hours PRN  levoFLOXacin  Tablet 500 milliGRAM(s) Oral every 24 hours  ertapenem  IVPB 1000 milliGRAM(s) IV Intermittent every 24 hours  ertapenem  IVPB   IV Intermittent   warfarin 2.5 milliGRAM(s) Oral once  tobramycin 0.3% Solution 2 Drop(s) Both EYES every 4 hours      Physical Exam:    Neuro:     Pulm:     CV:     Abd:     Extremities:    Incision(s):     Assessment:  76y Female                       PAST MEDICAL & SURGICAL HISTORY:  A-fib:  coverted 2008  BCC (basal cell carcinoma of skin)  HTN (hypertension), benign  Aortic stenosis  Mitral valve disease  Rheumatic fever: at 11 years of age  Ovarian cyst  Fibroid  BCC (basal cell carcinoma): removed from right neck  S/P cardiac catheterization:   S/P tonsillectomy  S/P MVR (mitral valve replacement):  - bovine  S/P hysterectomy with oophorectomy        Plan: Subjective: "I don't feel so great today."  Sitting up in bed.  Denies SOB or CP.  NAD noted.      Tele: SR/Afib                          T(F): 98.1 (17 @ 10:00), Max: 98.7 (17 @ 21:24)  HR: 65 (17 @ 10:00) (65 - 110)  BP: 101/54 (17 @ 10:00) (100/56 - 113/56)  RR: 17 (17 @ 10:00) (17 - 19)  SpO2: 96% (17 @ 05:01) (96% - 98%) on room air at rest.           Daily     Daily Weight in k.1 (01 Sep 2017 06:00)    LV EF: >75%      Allergies:  adhesives (Urticaria)            139  |  100  |  15.0  ----------------------------<  93  3.7   |  27.0  |  0.69    Ca    8.4<L>      01 Sep 2017 06:08  Phos  2.8       Mg     1.7         TPro  5.1<L>  /  Alb  3.0<L>  /  TBili  1.6  /  DBili  x   /  AST  33<H>  /  ALT  102<H>  /  AlkPhos  69                                 11.5   12.2  )-----------( 248      ( 01 Sep 2017 06:08 )             35.7        PT/INR - ( 01 Sep 2017 06:08 )   PT: 22.3 sec;   INR: 2.00 ratio                 CXR:  Xray Chest 1 View AP/PA. (17 @ 05:02) >  FINDINGS /   IMPRESSION:     There is moderate bibasilar right greater than pleural effusion and   compressive atelectasis. Status post sternotomy and valve replacement.      There are degenerative changes.       ADIEL FREEMAN M.D., ATTENDING RADIOLOGIST  This document has been electronically signed. Sep  1 2017  2:01PM       US Duplex Venous Upper Ext Ltd, Left (17 @ 13:38) >  FINDINGS:    Nonocclusive adherent thrombus in the left internal jugular vein.   Superficial thrombophlebitis in the left cephalic and basilic veins.   Incidentally noted near-complete occlusion of the left radial artery. The   left ulnar and brachial arteries are patent.    The left subclavian, axillary, andbrachial veins are patent and   compressible where applicable.     IMPRESSION:     1.  Nonocclusive adherent thrombus in the left internal jugular vein.  2.  Superficial thrombophlebitis in the left cephalic and basilic veins.  3.  Incidentally near complete occlusion of the left radial artery.    Dr. Stovall discussed the findings with ILENE Monterroso on 2017 at   1:53 PM.  Readback was obtained.            I&O's Detail    31 Aug 2017 07:  -  01 Sep 2017 07:00  --------------------------------------------------------  IN:    Oral Fluid: 240 mL  Total IN: 240 mL    OUT:    Voided: 575 mL  Total OUT: 575 mL    Total NET: -335 mL      01 Sep 2017 07:01  -  01 Sep 2017 14:03  --------------------------------------------------------  IN:    Oral Fluid: 120 mL  Total IN: 120 mL    OUT:    Voided: 250 mL  Total OUT: 250 mL    Total NET: -130 mL          CHEST TUBE:  [ ] YES [ x] NO  OUTPUT:     per 24 hours    AIR LEAKS:  [ ] YES [ ] NO      BETSEY DRAIN:   [ ] YES [x ] NO  OUTPUT:     per 24 hours    EPICARDIAL WIRES:  [x ] YES [ ] NO      BOWEL MOVEMENT:  [ ] YES [x ] NO        Active Medications:  docusate sodium 100 milliGRAM(s) Oral three times a day  dextrose 50% Injectable 12.5 Gram(s) IV Push once  dextrose 50% Injectable 25 Gram(s) IV Push once  dextrose 50% Injectable 25 Gram(s) IV Push once  senna 2 Tablet(s) Oral at bedtime  psyllium Powder 1 Packet(s) Oral two times a day  polyethylene glycol 3350 17 Gram(s) Oral daily PRN  bisacodyl Suppository 10 milliGRAM(s) Rectal daily PRN  pantoprazole    Tablet 40 milliGRAM(s) Oral before breakfast  sodium chloride 0.9% lock flush 3 milliLiter(s) IV Push every 8 hours  aspirin enteric coated 325 milliGRAM(s) Oral daily  metoprolol 50 milliGRAM(s) Oral two times a day  furosemide    Tablet 40 milliGRAM(s) Oral daily  potassium chloride    Tablet ER 20 milliEquivalent(s) Oral daily  diphenhydrAMINE   Capsule 25 milliGRAM(s) Oral every 6 hours PRN  levoFLOXacin  Tablet 500 milliGRAM(s) Oral every 24 hours  ertapenem  IVPB 1000 milliGRAM(s) IV Intermittent every 24 hours  ertapenem  IVPB   IV Intermittent   warfarin 2.5 milliGRAM(s) Oral once  tobramycin 0.3% Solution 2 Drop(s) Both EYES every 4 hours      Physical Exam:    Neuro: AAOX3.  No focal deficits.    Pulm: Diminished BLL.    CV: Irregular.  +S1+S2.    Abd: Soft/NT/ND.  +BS.  +BM 9/1 per pt.    Extremities: Trace edema BLE.  LUE + edema with discoloration/ecchymosis? to left palm (new since this AM), and ecchymosis to left radial region.  +Doppler pulse.  Incision(s): MSI GREYSON.  Borders with erythema/skin irritation (likely from reaction to adhesives/skin prep), No drainage noted.  Sternum stable.  Scab noted to mid and lower poles.    Diffuse rash to upper back noted.  Pt reports improved urticaria.                       PAST MEDICAL & SURGICAL HISTORY:  A-fib:  coverted 2008  BCC (basal cell carcinoma of skin)  HTN (hypertension), benign  Aortic stenosis  Mitral valve disease  Rheumatic fever: at 11 years of age  Ovarian cyst  Fibroid  BCC (basal cell carcinoma): removed from right neck  S/P cardiac catheterization:   S/P tonsillectomy  S/P MVR (mitral valve replacement):  - bovine  S/P hysterectomy with oophorectomy Subjective: "I don't feel so great today."  Sitting up in bed.  Denies SOB or CP.  NAD noted.      Tele: SR/Afib                          T(F): 98.1 (17 @ 10:00), Max: 98.7 (17 @ 21:24)  HR: 65 (17 @ 10:00) (65 - 110)  BP: 101/54 (17 @ 10:00) (100/56 - 113/56)  RR: 17 (17 @ 10:00) (17 - 19)  SpO2: 96% (17 @ 05:01) (96% - 98%) on room air at rest.           Daily     Daily Weight in k.1 (01 Sep 2017 06:00)    LV EF: >75%      Allergies:  adhesives (Urticaria)            139  |  100  |  15.0  ----------------------------<  93  3.7   |  27.0  |  0.69    Ca    8.4<L>      01 Sep 2017 06:08  Phos  2.8       Mg     1.7         TPro  5.1<L>  /  Alb  3.0<L>  /  TBili  1.6  /  DBili  x   /  AST  33<H>  /  ALT  102<H>  /  AlkPhos  69                                 11.5   12.2  )-----------( 248      ( 01 Sep 2017 06:08 )             35.7        PT/INR - ( 01 Sep 2017 06:08 )   PT: 22.3 sec;   INR: 2.00 ratio                 CXR:  Xray Chest 1 View AP/PA. (17 @ 05:02) >  FINDINGS /   IMPRESSION:     There is moderate bibasilar right greater than pleural effusion and   compressive atelectasis. Status post sternotomy and valve replacement.      There are degenerative changes.       ADIEL FREEMAN M.D., ATTENDING RADIOLOGIST  This document has been electronically signed. Sep  1 2017  2:01PM       US Duplex Venous Upper Ext Ltd, Left (17 @ 13:38) >  FINDINGS:    Nonocclusive adherent thrombus in the left internal jugular vein.   Superficial thrombophlebitis in the left cephalic and basilic veins.   Incidentally noted near-complete occlusion of the left radial artery. The   left ulnar and brachial arteries are patent.    The left subclavian, axillary, andbrachial veins are patent and   compressible where applicable.     IMPRESSION:     1.  Nonocclusive adherent thrombus in the left internal jugular vein.  2.  Superficial thrombophlebitis in the left cephalic and basilic veins.  3.  Incidentally near complete occlusion of the left radial artery.    Dr. Stovall discussed the findings with ILENE Monterroso on 2017 at   1:53 PM.  Readback was obtained.            I&O's Detail    31 Aug 2017 07:  -  01 Sep 2017 07:00  --------------------------------------------------------  IN:    Oral Fluid: 240 mL  Total IN: 240 mL    OUT:    Voided: 575 mL  Total OUT: 575 mL    Total NET: -335 mL      01 Sep 2017 07:01  -  01 Sep 2017 14:03  --------------------------------------------------------  IN:    Oral Fluid: 120 mL  Total IN: 120 mL    OUT:    Voided: 250 mL  Total OUT: 250 mL    Total NET: -130 mL          CHEST TUBE:  [ ] YES [ x] NO  OUTPUT:     per 24 hours    AIR LEAKS:  [ ] YES [ ] NO      BETSEY DRAIN:   [ ] YES [x ] NO  OUTPUT:     per 24 hours    EPICARDIAL WIRES:  [x ] YES [ ] NO      BOWEL MOVEMENT:  [ ] YES [x ] NO        Active Medications:  docusate sodium 100 milliGRAM(s) Oral three times a day  dextrose 50% Injectable 12.5 Gram(s) IV Push once  dextrose 50% Injectable 25 Gram(s) IV Push once  dextrose 50% Injectable 25 Gram(s) IV Push once  senna 2 Tablet(s) Oral at bedtime  psyllium Powder 1 Packet(s) Oral two times a day  polyethylene glycol 3350 17 Gram(s) Oral daily PRN  bisacodyl Suppository 10 milliGRAM(s) Rectal daily PRN  pantoprazole    Tablet 40 milliGRAM(s) Oral before breakfast  sodium chloride 0.9% lock flush 3 milliLiter(s) IV Push every 8 hours  aspirin enteric coated 325 milliGRAM(s) Oral daily  metoprolol 50 milliGRAM(s) Oral two times a day  furosemide    Tablet 40 milliGRAM(s) Oral daily  potassium chloride    Tablet ER 20 milliEquivalent(s) Oral daily  diphenhydrAMINE   Capsule 25 milliGRAM(s) Oral every 6 hours PRN  levoFLOXacin  Tablet 500 milliGRAM(s) Oral every 24 hours  ertapenem  IVPB 1000 milliGRAM(s) IV Intermittent every 24 hours  ertapenem  IVPB   IV Intermittent   warfarin 2.5 milliGRAM(s) Oral once  tobramycin 0.3% Solution 2 Drop(s) Both EYES every 4 hours      Physical Exam:    Neuro: AAOX3.  No focal deficits.    Pulm: Diminished BLL.    CV: Irregular.  +S1+S2.    Abd: Soft/NT/ND.  +BS.  +BM 9/1 per pt.    Extremities: Trace edema BLE.  LUE + edema with discoloration/ecchymosis? to left palm (new since this AM), and ecchymosis to left radial region.  +Doppler pulse.  Incision(s): MSI GREYSON.  Borders with erythema/skin irritation (likely from reaction to adhesives/skin prep), No drainage noted.  Sternum stable.  Scab noted to mid and lower poles.    Diffuse rash to upper back noted.  Pt reports improved urticaria.      Eyes:  BL eyes with erythema to sclera (L>R).  +white exudate noted to left eye.  Pt reports +pruritus.                     PAST MEDICAL & SURGICAL HISTORY:  A-fib: 2007 coverted 2008  BCC (basal cell carcinoma of skin)  HTN (hypertension), benign  Aortic stenosis  Mitral valve disease  Rheumatic fever: at 11 years of age  Ovarian cyst  Fibroid  BCC (basal cell carcinoma): removed from right neck  S/P cardiac catheterization:   S/P tonsillectomy  S/P MVR (mitral valve replacement):  - bovine  S/P hysterectomy with oophorectomy

## 2017-09-01 NOTE — PROGRESS NOTE ADULT - ASSESSMENT
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      Post operative course complicated by anemia requiring transfusions, atrial fibrillation, metabolic acidosis requiring resuscitation, thrombocytopenia currently noted to be HIT negative, increased LFTs/Amylase in the setting of persistent post operative N/V requiring hydration and NPO status currently resolving - tolerating a consistent carb, low fat diet. Patient has continued tachycardia in the setting of afib vs accelerated junction for which Amiodarone was increased to 400mg PO two times a day with EP consult called.

## 2017-09-02 VITALS
OXYGEN SATURATION: 98 % | HEART RATE: 90 BPM | DIASTOLIC BLOOD PRESSURE: 64 MMHG | TEMPERATURE: 98 F | SYSTOLIC BLOOD PRESSURE: 118 MMHG | RESPIRATION RATE: 18 BRPM

## 2017-09-02 LAB
ALBUMIN SERPL ELPH-MCNC: 2.9 G/DL — LOW (ref 3.3–5.2)
ALP SERPL-CCNC: 70 U/L — SIGNIFICANT CHANGE UP (ref 40–120)
ALT FLD-CCNC: 79 U/L — HIGH
AMYLASE P1 CFR SERPL: 272 U/L — HIGH (ref 36–128)
ANION GAP SERPL CALC-SCNC: 12 MMOL/L — SIGNIFICANT CHANGE UP (ref 5–17)
APTT BLD: 30.7 SEC — SIGNIFICANT CHANGE UP (ref 27.5–37.4)
AST SERPL-CCNC: 26 U/L — SIGNIFICANT CHANGE UP
BILIRUB SERPL-MCNC: 1.2 MG/DL — SIGNIFICANT CHANGE UP (ref 0.4–2)
BUN SERPL-MCNC: 15 MG/DL — SIGNIFICANT CHANGE UP (ref 8–20)
CALCIUM SERPL-MCNC: 8.5 MG/DL — LOW (ref 8.6–10.2)
CHLORIDE SERPL-SCNC: 98 MMOL/L — SIGNIFICANT CHANGE UP (ref 98–107)
CO2 SERPL-SCNC: 27 MMOL/L — SIGNIFICANT CHANGE UP (ref 22–29)
CREAT SERPL-MCNC: 0.7 MG/DL — SIGNIFICANT CHANGE UP (ref 0.5–1.3)
GLUCOSE SERPL-MCNC: 101 MG/DL — SIGNIFICANT CHANGE UP (ref 70–115)
HCT VFR BLD CALC: 35.1 % — LOW (ref 37–47)
HGB BLD-MCNC: 11.3 G/DL — LOW (ref 12–16)
INR BLD: 2.5 RATIO — HIGH (ref 0.88–1.16)
LIDOCAIN IGE QN: 423 U/L — HIGH (ref 22–51)
MAGNESIUM SERPL-MCNC: 1.8 MG/DL — SIGNIFICANT CHANGE UP (ref 1.6–2.6)
MCHC RBC-ENTMCNC: 28.7 PG — SIGNIFICANT CHANGE UP (ref 27–31)
MCHC RBC-ENTMCNC: 32.2 G/DL — SIGNIFICANT CHANGE UP (ref 32–36)
MCV RBC AUTO: 89.1 FL — SIGNIFICANT CHANGE UP (ref 81–99)
PLATELET # BLD AUTO: 300 K/UL — SIGNIFICANT CHANGE UP (ref 150–400)
POTASSIUM SERPL-MCNC: 4.1 MMOL/L — SIGNIFICANT CHANGE UP (ref 3.5–5.3)
POTASSIUM SERPL-SCNC: 4.1 MMOL/L — SIGNIFICANT CHANGE UP (ref 3.5–5.3)
PROT SERPL-MCNC: 5.3 G/DL — LOW (ref 6.6–8.7)
PROTHROM AB SERPL-ACNC: 28 SEC — HIGH (ref 9.8–12.7)
RBC # BLD: 3.94 M/UL — LOW (ref 4.4–5.2)
RBC # FLD: 16.1 % — HIGH (ref 11–15.6)
SODIUM SERPL-SCNC: 137 MMOL/L — SIGNIFICANT CHANGE UP (ref 135–145)
WBC # BLD: 12.6 K/UL — HIGH (ref 4.8–10.8)
WBC # FLD AUTO: 12.6 K/UL — HIGH (ref 4.8–10.8)

## 2017-09-02 PROCEDURE — 93010 ELECTROCARDIOGRAM REPORT: CPT

## 2017-09-02 PROCEDURE — 71010: CPT | Mod: 26

## 2017-09-02 RX ORDER — METOPROLOL TARTRATE 50 MG
1 TABLET ORAL
Qty: 60 | Refills: 1 | OUTPATIENT
Start: 2017-09-02 | End: 2017-10-31

## 2017-09-02 RX ORDER — WARFARIN SODIUM 2.5 MG/1
1 TABLET ORAL
Qty: 30 | Refills: 0 | OUTPATIENT
Start: 2017-09-02 | End: 2017-09-03

## 2017-09-02 RX ORDER — POTASSIUM CHLORIDE 20 MEQ
20 PACKET (EA) ORAL ONCE
Qty: 0 | Refills: 0 | Status: COMPLETED | OUTPATIENT
Start: 2017-09-02 | End: 2017-09-02

## 2017-09-02 RX ORDER — DOCUSATE SODIUM 100 MG
1 CAPSULE ORAL
Qty: 0 | Refills: 0 | COMMUNITY
Start: 2017-09-02

## 2017-09-02 RX ORDER — AMIODARONE HYDROCHLORIDE 400 MG/1
1 TABLET ORAL
Qty: 30 | Refills: 1 | OUTPATIENT
Start: 2017-09-02 | End: 2017-10-31

## 2017-09-02 RX ORDER — TOBRAMYCIN 0.3 %
2 DROPS OPHTHALMIC (EYE)
Qty: 1 | Refills: 0 | OUTPATIENT
Start: 2017-09-02 | End: 2017-09-11

## 2017-09-02 RX ORDER — WARFARIN SODIUM 2.5 MG/1
2 TABLET ORAL ONCE
Qty: 0 | Refills: 0 | Status: DISCONTINUED | OUTPATIENT
Start: 2017-09-02 | End: 2017-09-02

## 2017-09-02 RX ORDER — MAGNESIUM OXIDE 400 MG ORAL TABLET 241.3 MG
400 TABLET ORAL
Qty: 0 | Refills: 0 | Status: DISCONTINUED | OUTPATIENT
Start: 2017-09-02 | End: 2017-09-02

## 2017-09-02 RX ORDER — POTASSIUM CHLORIDE 20 MEQ
1 PACKET (EA) ORAL
Qty: 14 | Refills: 0 | OUTPATIENT
Start: 2017-09-02 | End: 2017-09-16

## 2017-09-02 RX ORDER — PANTOPRAZOLE SODIUM 20 MG/1
1 TABLET, DELAYED RELEASE ORAL
Qty: 14 | Refills: 0 | OUTPATIENT
Start: 2017-09-02 | End: 2017-09-16

## 2017-09-02 RX ORDER — FUROSEMIDE 40 MG
1 TABLET ORAL
Qty: 14 | Refills: 0 | OUTPATIENT
Start: 2017-09-02 | End: 2017-09-16

## 2017-09-02 RX ORDER — METOPROLOL TARTRATE 50 MG
0.5 TABLET ORAL
Qty: 0 | Refills: 0 | COMMUNITY

## 2017-09-02 RX ADMIN — AMIODARONE HYDROCHLORIDE 200 MILLIGRAM(S): 400 TABLET ORAL at 05:34

## 2017-09-02 RX ADMIN — Medication 20 MILLIEQUIVALENT(S): at 11:26

## 2017-09-02 RX ADMIN — Medication 325 MILLIGRAM(S): at 11:26

## 2017-09-02 RX ADMIN — Medication 2 DROP(S): at 07:10

## 2017-09-02 RX ADMIN — Medication 50 MILLIGRAM(S): at 05:34

## 2017-09-02 RX ADMIN — Medication 100 MILLIGRAM(S): at 05:34

## 2017-09-02 RX ADMIN — SODIUM CHLORIDE 3 MILLILITER(S): 9 INJECTION INTRAMUSCULAR; INTRAVENOUS; SUBCUTANEOUS at 13:43

## 2017-09-02 RX ADMIN — Medication 1 PACKET(S): at 05:34

## 2017-09-02 RX ADMIN — Medication 40 MILLIGRAM(S): at 05:34

## 2017-09-02 RX ADMIN — SODIUM CHLORIDE 3 MILLILITER(S): 9 INJECTION INTRAMUSCULAR; INTRAVENOUS; SUBCUTANEOUS at 05:34

## 2017-09-02 RX ADMIN — Medication 2 DROP(S): at 04:49

## 2017-09-02 RX ADMIN — Medication 2 DROP(S): at 10:42

## 2017-09-02 RX ADMIN — Medication 20 MILLIEQUIVALENT(S): at 10:42

## 2017-09-02 RX ADMIN — MAGNESIUM OXIDE 400 MG ORAL TABLET 400 MILLIGRAM(S): 241.3 TABLET ORAL at 12:28

## 2017-09-02 RX ADMIN — PANTOPRAZOLE SODIUM 40 MILLIGRAM(S): 20 TABLET, DELAYED RELEASE ORAL at 05:34

## 2017-09-02 NOTE — PROGRESS NOTE ADULT - PROBLEM SELECTOR PLAN 5
Amio induced?  vs low parvez state post op.  continue to follow,  GI consulted
As per gi most likely to be passive congestion   LFTs and Amylase/Lipase essentially stable today.    Dr. Alaniz feels that it is from hepatic congestion.  Pt is asymptomatic and there is no further recommendation from GI standpoint.    Pt tolerating diet.
As per gi most likely to be passive congestion   LFTs and Amylase/Lipase uptrending today.  Discussed with Dr. Alaniz and he feels that it is from hepatic congestion.  Pt is asymptomatic and there is no further recommendation from GI standpoint.    Pt tolerating diet.
as per gi most likely to be passive congestion   LFTs and Amylase/Lipase down trending  diet advanced, tolerating well and having BMs
as per gi most likely to be passive congestion   LFTs and Amylase/Lipase down trending  diet advanced, tolerating well and having BMs
as per gi most likely to be passive congestion   continue to trend enzymes   consider advancing diet in AM
as per gi most likely to be passive congestion   continue to trend enzymes   diet advanced, tolerating well and having BMS.
Amio induced?  vs low parvez state post op.  continue to follow,

## 2017-09-02 NOTE — PROGRESS NOTE ADULT - PROBLEM SELECTOR PROBLEM 9
Acute bacterial conjunctivitis of both eyes
Constipation, unspecified constipation type
Constipation, unspecified constipation type
Acute bacterial conjunctivitis of both eyes

## 2017-09-02 NOTE — PROGRESS NOTE ADULT - PROBLEM SELECTOR PROBLEM 7
Localized edema
Localized edema
Need for prophylactic measure

## 2017-09-02 NOTE — PROGRESS NOTE ADULT - PROBLEM SELECTOR PLAN 1
s/p mitral valve replacement.  Continue coumadin for MVR/AF  ASA restarted, continue to trend CBC.  Encourage CDBE/IS and increased mobilization.  PW cut today without issue  Cleared for discharge to home.

## 2017-09-02 NOTE — PROGRESS NOTE ADULT - PROBLEM SELECTOR PLAN 10
Therapeutic on Coumadin.  Maintain SCD boots and encourage ambulation.  Continue Protonix for GI prophylaxis.    Plan for discharge home today.  Discussed with CT surgery team and Dr. Rae in AM rounds.
supplemented  magnesium supplemented
Therapeutic on Coumadin.  Maintain SCD boots and encourage ambulation.  Continue Protonix for GI prophylaxis.    Plan for discharge home when medically stable.  Discussed with CT surgery team and Dr. Kinney in AM rounds.

## 2017-09-02 NOTE — PROGRESS NOTE ADULT - PROBLEM SELECTOR PLAN 4
Noted post op, ENT evaluated, and found to have small laceration  Resolved, no issue presently.
Noted post op, ENT evaluated, and found to have small laceration  Resolved, no issue presently.
noted post op, ENT evaluated, and found to have small laceration  Resolved, no issue presently
noted post op, ENT evaluated, and found to have small laceration  Resolved, no issue presently
noted post op, ENT evaluated, and found to have small laceration. continue to monitor.

## 2017-09-02 NOTE — PROGRESS NOTE ADULT - PROBLEM SELECTOR PROBLEM 4
Laceration of pharynx, initial encounter

## 2017-09-02 NOTE — PROGRESS NOTE ADULT - PROBLEM SELECTOR PLAN 6
ASA restarted today  No need for chemical DVT ppx at this time because patient is ambulating  Keep SCD boots while in bed  HIT negative
ASA restarted today  No need for chemical DVT ppx at this time because patient is ambulating  Keep SCD boots while in bed  HIT negative
Resolved.  (HIT/JAVIER negative)
Resolved.  (HIT/JAVIER negative)
hold aspirin and lovenox  HIT negative
hold aspirin and lovenox  HIT negative f/u JAVIER
hold aspirin and lovenox  f/u HIT and JAVIER
hold aspirin and lovenox

## 2017-09-02 NOTE — PROGRESS NOTE ADULT - PROBLEM SELECTOR PLAN 7
Left arm edema noted. Left radial IV saline lock d/c'd due to possible infiltrate.  Ecchymosis noted to left radial area and also ecchymosis vs discoloration to left palm (new from this AM).  Dr. Nirmal THOMAS doppler obtained (findings as above).  Vascular consult called.  Pt is therapeutic on Coumadin.
Left arm edema noted. Left radial IV saline lock d/c'd due to possible infiltrate.  Ecchymosis noted to left radial area and also ecchymosis vs discoloration to left palm (new from this AM).  Dr. Nirmal THOMAS doppler obtained (findings as above).  Vascular consult called.  Pt is therapeutic on Coumadin.
as above   encourage ambulation, maintain SCD boots  continue protonix and colace
as above   encourage ambulation, maintain SCD boots  continue protonix and colace
as above   encourage ambulation, maintain SCD boots, consider lovenox when appropriate   continue protonix and colace
as above   encourage ambulation, maintain SCD boots, consider lovenox when appropriate   continue protonix and colace

## 2017-09-02 NOTE — PROGRESS NOTE ADULT - PROBLEM SELECTOR PROBLEM 6
Thrombocytopenia

## 2017-09-02 NOTE — PROGRESS NOTE ADULT - PROBLEM SELECTOR PROBLEM 3
Atrial fibrillation, unspecified type

## 2017-09-02 NOTE — PROGRESS NOTE ADULT - PROBLEM SELECTOR PLAN 3
EP consult: monitor for bradyarrhythmias.  Amiodarone decreased to 200mg daily as per Dr. Kinney.  Continue lopressor.  Continue coumadin with INR check on Tuesday 9/5 (home draw).  TTE 9/1 negative pericardial effusion (therapeutic INR today).

## 2017-09-02 NOTE — PROGRESS NOTE ADULT - SUBJECTIVE AND OBJECTIVE BOX
Subjective: "I feel better today." Patient expresses the wish to go home. NAD, seated in chair    VITAL SIGNS  Vital Signs Last 24 Hrs  T(C): 36.8 (17 @ 10:00), Max: 36.9 (17 @ 01:00)  T(F): 98.2 (17 @ 10:00), Max: 98.4 (17 @ 01:00)  HR: 94 (17 @ 11:20) (90 - 130)  BP: 112/64 (17 @ 11:20) (100/60 - 118/60)  RR: 16 (17 @ 05:29) (16 - 18)  SpO2: 98% (17 @ 05:29) (97% - 100%)  on (O2)              Telemetry/Alarms:   (-140s)  LVEF: 75%    MEDICATIONS  docusate sodium 100 milliGRAM(s) Oral three times a day  dextrose 50% Injectable 12.5 Gram(s) IV Push once  dextrose 50% Injectable 25 Gram(s) IV Push once  dextrose 50% Injectable 25 Gram(s) IV Push once  senna 2 Tablet(s) Oral at bedtime  psyllium Powder 1 Packet(s) Oral two times a day  polyethylene glycol 3350 17 Gram(s) Oral daily PRN  bisacodyl Suppository 10 milliGRAM(s) Rectal daily PRN  pantoprazole    Tablet 40 milliGRAM(s) Oral before breakfast  sodium chloride 0.9% lock flush 3 milliLiter(s) IV Push every 8 hours  aspirin enteric coated 325 milliGRAM(s) Oral daily  metoprolol 50 milliGRAM(s) Oral two times a day  furosemide    Tablet 40 milliGRAM(s) Oral daily  potassium chloride    Tablet ER 20 milliEquivalent(s) Oral daily  diphenhydrAMINE   Capsule 25 milliGRAM(s) Oral every 6 hours PRN  levoFLOXacin  Tablet 500 milliGRAM(s) Oral every 24 hours  amiodarone    Tablet 200 milliGRAM(s) Oral daily  tobramycin 0.3% Solution 2 Drop(s) Both EYES every 4 hours  warfarin 2 milliGRAM(s) Oral once  magnesium oxide 400 milliGRAM(s) Oral three times a day with meals      PHYSICAL EXAM  General: well nourished, well developed, no acute distress  Neurology: alert and oriented x 3, nonfocal, no gross deficits  Respiratory: diminished bases, otherwise clear  CV: irregular, S1, S2, no murmurs  Abdomen: soft, nontender, nondistended, positive bowel sounds, last bowel movement   Extremities: warm, well perfused. +1 edema. + DP pulses  Left wrist ecchymosis noted - patient reports improved from day prior  Incisions: midline sternal incision, c/d/i. sternum stable. ++erythema over incision in areas of adhesives including left neck and chest tube sites. nonblanching.   Epicardial Wires:   cut       @ 07:01  -   @ 07:00  --------------------------------------------------------  IN: 290 mL / OUT: 800 mL / NET: -510 mL        Weights:  Daily     Daily Weight in k.5 (02 Sep 2017 05:00)  Admit Wt: Drug Dosing Weight  Height (cm): 162.56 (24 Aug 2017 06:33)  Weight (kg): 52 (24 Aug 2017 06:33)  BMI (kg/m2): 19.7 (24 Aug 2017 06:33)  BSA (m2): 1.54 (24 Aug 2017 06:33)    LABS      137  |  98  |  15.0  ----------------------------<  101  4.1   |  27.0  |  0.70    Ca    8.5<L>      02 Sep 2017 05:42  Phos  2.8       Mg     1.8         TPro  5.3<L>  /  Alb  2.9<L>  /  TBili  1.2  /  DBili  x   /  AST  26  /  ALT  79<H>  /  AlkPhos  70                                   11.3   12.6  )-----------( 300      ( 02 Sep 2017 05:42 )             35.1          PT/INR - ( 02 Sep 2017 05:42 )   PT: 28.0 sec;   INR: 2.50 ratio         PTT - ( 02 Sep 2017 05:42 )  PTT:30.7 sec  Bilirubin Total, Serum: 1.2 mg/dL ( @ 05:42)          Today's CXR: < from: Xray Chest 1 View AP/PA. (17 @ 05:25) >  FINDINGS:  Prior study dated 2017 was available for review.    The lungs demonstrate small bilateral pleural effusions unchanged from   prior examination. Mild increased pulmonary vascular congestion is noted   unchanged from prior examination.    The heart is difficult to evaluate. Patient is status post median   sternotomy and valve replacement.           IMPRESSION: Mild increased pulmonary vascular congestion noted. Small   bilateral pleural effusions noted. Status post valve replacement. No   significant change from prior study dated 2017.    < end of copied text >      Today's EKG: AJR 96, QTc 510 (493)    PAST MEDICAL & SURGICAL HISTORY:  A-fib:  coverted 2008  BCC (basal cell carcinoma of skin)  HTN (hypertension), benign  Aortic stenosis  Mitral valve disease  Rheumatic fever: at 11 years of age  Ovarian cyst  Fibroid  BCC (basal cell carcinoma): removed from right neck  S/P cardiac catheterization:   S/P tonsillectomy  S/P MVR (mitral valve replacement):  - bovine  S/P hysterectomy with oophorectomy

## 2017-09-03 LAB
-  AMIKACIN: SIGNIFICANT CHANGE UP
-  AMIKACIN: SIGNIFICANT CHANGE UP
-  AMPICILLIN/SULBACTAM: SIGNIFICANT CHANGE UP
-  AMPICILLIN/SULBACTAM: SIGNIFICANT CHANGE UP
-  AMPICILLIN: SIGNIFICANT CHANGE UP
-  AMPICILLIN: SIGNIFICANT CHANGE UP
-  AZTREONAM: SIGNIFICANT CHANGE UP
-  AZTREONAM: SIGNIFICANT CHANGE UP
-  CEFAZOLIN: SIGNIFICANT CHANGE UP
-  CEFAZOLIN: SIGNIFICANT CHANGE UP
-  CEFEPIME: SIGNIFICANT CHANGE UP
-  CEFEPIME: SIGNIFICANT CHANGE UP
-  CEFOXITIN: SIGNIFICANT CHANGE UP
-  CEFOXITIN: SIGNIFICANT CHANGE UP
-  CEFTAZIDIME: SIGNIFICANT CHANGE UP
-  CEFTAZIDIME: SIGNIFICANT CHANGE UP
-  CEFTRIAXONE: SIGNIFICANT CHANGE UP
-  CEFTRIAXONE: SIGNIFICANT CHANGE UP
-  CIPROFLOXACIN: SIGNIFICANT CHANGE UP
-  CIPROFLOXACIN: SIGNIFICANT CHANGE UP
-  ERTAPENEM: SIGNIFICANT CHANGE UP
-  ERTAPENEM: SIGNIFICANT CHANGE UP
-  GENTAMICIN: SIGNIFICANT CHANGE UP
-  GENTAMICIN: SIGNIFICANT CHANGE UP
-  IMIPENEM: SIGNIFICANT CHANGE UP
-  IMIPENEM: SIGNIFICANT CHANGE UP
-  LEVOFLOXACIN: SIGNIFICANT CHANGE UP
-  LEVOFLOXACIN: SIGNIFICANT CHANGE UP
-  MEROPENEM: SIGNIFICANT CHANGE UP
-  MEROPENEM: SIGNIFICANT CHANGE UP
-  NITROFURANTOIN: SIGNIFICANT CHANGE UP
-  NITROFURANTOIN: SIGNIFICANT CHANGE UP
-  PIPERACILLIN/TAZOBACTAM: SIGNIFICANT CHANGE UP
-  PIPERACILLIN/TAZOBACTAM: SIGNIFICANT CHANGE UP
-  TOBRAMYCIN: SIGNIFICANT CHANGE UP
-  TOBRAMYCIN: SIGNIFICANT CHANGE UP
-  TRIMETHOPRIM/SULFAMETHOXAZOLE: SIGNIFICANT CHANGE UP
-  TRIMETHOPRIM/SULFAMETHOXAZOLE: SIGNIFICANT CHANGE UP
CULTURE RESULTS: SIGNIFICANT CHANGE UP
METHOD TYPE: SIGNIFICANT CHANGE UP
METHOD TYPE: SIGNIFICANT CHANGE UP
ORGANISM # SPEC MICROSCOPIC CNT: SIGNIFICANT CHANGE UP
SPECIMEN SOURCE: SIGNIFICANT CHANGE UP

## 2017-09-06 LAB
CULTURE RESULTS: SIGNIFICANT CHANGE UP
SPECIMEN SOURCE: SIGNIFICANT CHANGE UP

## 2017-09-19 ENCOUNTER — APPOINTMENT (OUTPATIENT)
Dept: CARDIOTHORACIC SURGERY | Facility: CLINIC | Age: 77
End: 2017-09-19

## 2017-09-19 VITALS
OXYGEN SATURATION: 96 % | WEIGHT: 130 LBS | DIASTOLIC BLOOD PRESSURE: 72 MMHG | SYSTOLIC BLOOD PRESSURE: 117 MMHG | BODY MASS INDEX: 21.66 KG/M2 | HEART RATE: 84 BPM | RESPIRATION RATE: 16 BRPM | HEIGHT: 65 IN

## 2017-10-19 PROCEDURE — 85014 HEMATOCRIT: CPT

## 2017-10-19 PROCEDURE — 80053 COMPREHEN METABOLIC PANEL: CPT

## 2017-10-19 PROCEDURE — 88305 TISSUE EXAM BY PATHOLOGIST: CPT

## 2017-10-19 PROCEDURE — 87040 BLOOD CULTURE FOR BACTERIA: CPT

## 2017-10-19 PROCEDURE — 83605 ASSAY OF LACTIC ACID: CPT

## 2017-10-19 PROCEDURE — 83690 ASSAY OF LIPASE: CPT

## 2017-10-19 PROCEDURE — 84100 ASSAY OF PHOSPHORUS: CPT

## 2017-10-19 PROCEDURE — 82947 ASSAY GLUCOSE BLOOD QUANT: CPT

## 2017-10-19 PROCEDURE — 82803 BLOOD GASES ANY COMBINATION: CPT

## 2017-10-19 PROCEDURE — 74177 CT ABD & PELVIS W/CONTRAST: CPT

## 2017-10-19 PROCEDURE — 80061 LIPID PANEL: CPT

## 2017-10-19 PROCEDURE — 93005 ELECTROCARDIOGRAM TRACING: CPT

## 2017-10-19 PROCEDURE — 82150 ASSAY OF AMYLASE: CPT

## 2017-10-19 PROCEDURE — P9012: CPT

## 2017-10-19 PROCEDURE — 86965 POOLING BLOOD PLATELETS: CPT

## 2017-10-19 PROCEDURE — 84484 ASSAY OF TROPONIN QUANT: CPT

## 2017-10-19 PROCEDURE — 87186 SC STD MICRODIL/AGAR DIL: CPT

## 2017-10-19 PROCEDURE — 94002 VENT MGMT INPAT INIT DAY: CPT

## 2017-10-19 PROCEDURE — 36430 TRANSFUSION BLD/BLD COMPNT: CPT

## 2017-10-19 PROCEDURE — 82435 ASSAY OF BLOOD CHLORIDE: CPT

## 2017-10-19 PROCEDURE — 87086 URINE CULTURE/COLONY COUNT: CPT

## 2017-10-19 PROCEDURE — 97110 THERAPEUTIC EXERCISES: CPT

## 2017-10-19 PROCEDURE — C1889: CPT

## 2017-10-19 PROCEDURE — 36592 COLLECT BLOOD FROM PICC: CPT

## 2017-10-19 PROCEDURE — 97163 PT EVAL HIGH COMPLEX 45 MIN: CPT

## 2017-10-19 PROCEDURE — C1768: CPT

## 2017-10-19 PROCEDURE — 84132 ASSAY OF SERUM POTASSIUM: CPT

## 2017-10-19 PROCEDURE — 80048 BASIC METABOLIC PNL TOTAL CA: CPT

## 2017-10-19 PROCEDURE — 76700 US EXAM ABDOM COMPLETE: CPT

## 2017-10-19 PROCEDURE — 88300 SURGICAL PATH GROSS: CPT

## 2017-10-19 PROCEDURE — 94760 N-INVAS EAR/PLS OXIMETRY 1: CPT

## 2017-10-19 PROCEDURE — 99231 SBSQ HOSP IP/OBS SF/LOW 25: CPT

## 2017-10-19 PROCEDURE — 36415 COLL VENOUS BLD VENIPUNCTURE: CPT

## 2017-10-19 PROCEDURE — 97116 GAIT TRAINING THERAPY: CPT

## 2017-10-19 PROCEDURE — 83735 ASSAY OF MAGNESIUM: CPT

## 2017-10-19 PROCEDURE — P9037: CPT

## 2017-10-19 PROCEDURE — 93971 EXTREMITY STUDY: CPT

## 2017-10-19 PROCEDURE — 93308 TTE F-UP OR LMTD: CPT

## 2017-10-19 PROCEDURE — 85027 COMPLETE CBC AUTOMATED: CPT

## 2017-10-19 PROCEDURE — 81001 URINALYSIS AUTO W/SCOPE: CPT

## 2017-10-19 PROCEDURE — 84295 ASSAY OF SERUM SODIUM: CPT

## 2017-10-19 PROCEDURE — 97530 THERAPEUTIC ACTIVITIES: CPT

## 2017-10-19 PROCEDURE — 86022 PLATELET ANTIBODIES: CPT

## 2017-10-19 PROCEDURE — 82550 ASSAY OF CK (CPK): CPT

## 2017-10-19 PROCEDURE — 88307 TISSUE EXAM BY PATHOLOGIST: CPT

## 2017-10-19 PROCEDURE — P9016: CPT

## 2017-10-19 PROCEDURE — 82553 CREATINE MB FRACTION: CPT

## 2017-10-19 PROCEDURE — 74000: CPT

## 2017-10-19 PROCEDURE — 71045 X-RAY EXAM CHEST 1 VIEW: CPT

## 2017-10-19 PROCEDURE — 85730 THROMBOPLASTIN TIME PARTIAL: CPT

## 2017-10-19 PROCEDURE — P9045: CPT

## 2017-10-19 PROCEDURE — 80076 HEPATIC FUNCTION PANEL: CPT

## 2017-10-19 PROCEDURE — 82330 ASSAY OF CALCIUM: CPT

## 2017-10-19 PROCEDURE — 85610 PROTHROMBIN TIME: CPT

## 2019-01-24 ENCOUNTER — INPATIENT (INPATIENT)
Facility: HOSPITAL | Age: 79
LOS: 5 days | Discharge: HOME | DRG: 310 | End: 2019-01-30
Attending: HOSPITALIST | Admitting: INTERNAL MEDICINE
Payer: COMMERCIAL

## 2019-01-24 VITALS
HEIGHT: 65 IN | RESPIRATION RATE: 18 BRPM | SYSTOLIC BLOOD PRESSURE: 128 MMHG | DIASTOLIC BLOOD PRESSURE: 83 MMHG | WEIGHT: 115.08 LBS | HEART RATE: 105 BPM | OXYGEN SATURATION: 98 % | TEMPERATURE: 98 F

## 2019-01-24 DIAGNOSIS — M85.80 OTHER SPECIFIED DISORDERS OF BONE DENSITY AND STRUCTURE, UNSPECIFIED SITE: ICD-10-CM

## 2019-01-24 DIAGNOSIS — I48.0 PAROXYSMAL ATRIAL FIBRILLATION: ICD-10-CM

## 2019-01-24 DIAGNOSIS — I10 ESSENTIAL (PRIMARY) HYPERTENSION: ICD-10-CM

## 2019-01-24 DIAGNOSIS — R00.2 PALPITATIONS: ICD-10-CM

## 2019-01-24 DIAGNOSIS — Z29.9 ENCOUNTER FOR PROPHYLACTIC MEASURES, UNSPECIFIED: ICD-10-CM

## 2019-01-24 DIAGNOSIS — R55 SYNCOPE AND COLLAPSE: ICD-10-CM

## 2019-01-24 DIAGNOSIS — C44.91 BASAL CELL CARCINOMA OF SKIN, UNSPECIFIED: Chronic | ICD-10-CM

## 2019-01-24 DIAGNOSIS — I05.9 RHEUMATIC MITRAL VALVE DISEASE, UNSPECIFIED: ICD-10-CM

## 2019-01-24 PROBLEM — I48.91 UNSPECIFIED ATRIAL FIBRILLATION: Chronic | Status: ACTIVE | Noted: 2017-08-10

## 2019-01-24 LAB
ALBUMIN SERPL ELPH-MCNC: 3.7 G/DL — SIGNIFICANT CHANGE UP (ref 3.3–5)
ALP SERPL-CCNC: 99 U/L — SIGNIFICANT CHANGE UP (ref 40–120)
ALT FLD-CCNC: 36 U/L — SIGNIFICANT CHANGE UP (ref 12–78)
ANION GAP SERPL CALC-SCNC: 5 MMOL/L — SIGNIFICANT CHANGE UP (ref 5–17)
APTT BLD: 31.7 SEC — SIGNIFICANT CHANGE UP (ref 28.5–37)
AST SERPL-CCNC: 40 U/L — HIGH (ref 15–37)
BASOPHILS # BLD AUTO: 0.05 K/UL — SIGNIFICANT CHANGE UP (ref 0–0.2)
BASOPHILS NFR BLD AUTO: 0.7 % — SIGNIFICANT CHANGE UP (ref 0–2)
BILIRUB SERPL-MCNC: 0.7 MG/DL — SIGNIFICANT CHANGE UP (ref 0.2–1.2)
BUN SERPL-MCNC: 20 MG/DL — SIGNIFICANT CHANGE UP (ref 7–23)
CALCIUM SERPL-MCNC: 9.5 MG/DL — SIGNIFICANT CHANGE UP (ref 8.5–10.1)
CHLORIDE SERPL-SCNC: 103 MMOL/L — SIGNIFICANT CHANGE UP (ref 96–108)
CK MB BLD-MCNC: 2.6 % — SIGNIFICANT CHANGE UP (ref 0–3.5)
CK MB CFR SERPL CALC: 1.7 NG/ML — SIGNIFICANT CHANGE UP (ref 0–3.6)
CK SERPL-CCNC: 66 U/L — SIGNIFICANT CHANGE UP (ref 26–192)
CO2 SERPL-SCNC: 31 MMOL/L — SIGNIFICANT CHANGE UP (ref 22–31)
CREAT SERPL-MCNC: 1.1 MG/DL — SIGNIFICANT CHANGE UP (ref 0.5–1.3)
EOSINOPHIL # BLD AUTO: 0.06 K/UL — SIGNIFICANT CHANGE UP (ref 0–0.5)
EOSINOPHIL NFR BLD AUTO: 0.8 % — SIGNIFICANT CHANGE UP (ref 0–6)
GLUCOSE SERPL-MCNC: 100 MG/DL — HIGH (ref 70–99)
HCT VFR BLD CALC: 43.8 % — SIGNIFICANT CHANGE UP (ref 34.5–45)
HGB BLD-MCNC: 14.1 G/DL — SIGNIFICANT CHANGE UP (ref 11.5–15.5)
IMM GRANULOCYTES NFR BLD AUTO: 0.3 % — SIGNIFICANT CHANGE UP (ref 0–1.5)
INR BLD: 0.98 RATIO — SIGNIFICANT CHANGE UP (ref 0.88–1.16)
LYMPHOCYTES # BLD AUTO: 1.17 K/UL — SIGNIFICANT CHANGE UP (ref 1–3.3)
LYMPHOCYTES # BLD AUTO: 15.9 % — SIGNIFICANT CHANGE UP (ref 13–44)
MCHC RBC-ENTMCNC: 27.8 PG — SIGNIFICANT CHANGE UP (ref 27–34)
MCHC RBC-ENTMCNC: 32.2 GM/DL — SIGNIFICANT CHANGE UP (ref 32–36)
MCV RBC AUTO: 86.2 FL — SIGNIFICANT CHANGE UP (ref 80–100)
MONOCYTES # BLD AUTO: 0.89 K/UL — SIGNIFICANT CHANGE UP (ref 0–0.9)
MONOCYTES NFR BLD AUTO: 12.1 % — SIGNIFICANT CHANGE UP (ref 2–14)
NEUTROPHILS # BLD AUTO: 5.15 K/UL — SIGNIFICANT CHANGE UP (ref 1.8–7.4)
NEUTROPHILS NFR BLD AUTO: 70.2 % — SIGNIFICANT CHANGE UP (ref 43–77)
NRBC # BLD: 0 /100 WBCS — SIGNIFICANT CHANGE UP (ref 0–0)
PLATELET # BLD AUTO: 312 K/UL — SIGNIFICANT CHANGE UP (ref 150–400)
POTASSIUM SERPL-MCNC: 4.7 MMOL/L — SIGNIFICANT CHANGE UP (ref 3.5–5.3)
POTASSIUM SERPL-SCNC: 4.7 MMOL/L — SIGNIFICANT CHANGE UP (ref 3.5–5.3)
PROT SERPL-MCNC: 8.1 G/DL — SIGNIFICANT CHANGE UP (ref 6–8.3)
PROTHROM AB SERPL-ACNC: 11.2 SEC — SIGNIFICANT CHANGE UP (ref 10–12.9)
RBC # BLD: 5.08 M/UL — SIGNIFICANT CHANGE UP (ref 3.8–5.2)
RBC # FLD: 14.5 % — SIGNIFICANT CHANGE UP (ref 10.3–14.5)
SODIUM SERPL-SCNC: 139 MMOL/L — SIGNIFICANT CHANGE UP (ref 135–145)
TROPONIN I SERPL-MCNC: <.015 NG/ML — SIGNIFICANT CHANGE UP (ref 0.01–0.04)
WBC # BLD: 7.34 K/UL — SIGNIFICANT CHANGE UP (ref 3.8–10.5)
WBC # FLD AUTO: 7.34 K/UL — SIGNIFICANT CHANGE UP (ref 3.8–10.5)

## 2019-01-24 PROCEDURE — 93010 ELECTROCARDIOGRAM REPORT: CPT

## 2019-01-24 PROCEDURE — 70450 CT HEAD/BRAIN W/O DYE: CPT | Mod: 26

## 2019-01-24 PROCEDURE — 99223 1ST HOSP IP/OBS HIGH 75: CPT | Mod: GC,AI

## 2019-01-24 PROCEDURE — 99285 EMERGENCY DEPT VISIT HI MDM: CPT

## 2019-01-24 PROCEDURE — 71045 X-RAY EXAM CHEST 1 VIEW: CPT | Mod: 26

## 2019-01-24 RX ORDER — ENOXAPARIN SODIUM 100 MG/ML
40 INJECTION SUBCUTANEOUS EVERY 24 HOURS
Qty: 0 | Refills: 0 | Status: DISCONTINUED | OUTPATIENT
Start: 2019-01-24 | End: 2019-01-26

## 2019-01-24 RX ORDER — SODIUM CHLORIDE 9 MG/ML
1000 INJECTION INTRAMUSCULAR; INTRAVENOUS; SUBCUTANEOUS ONCE
Qty: 0 | Refills: 0 | Status: COMPLETED | OUTPATIENT
Start: 2019-01-24 | End: 2019-01-24

## 2019-01-24 RX ORDER — ASPIRIN/CALCIUM CARB/MAGNESIUM 324 MG
81 TABLET ORAL DAILY
Qty: 0 | Refills: 0 | Status: DISCONTINUED | OUTPATIENT
Start: 2019-01-24 | End: 2019-01-30

## 2019-01-24 RX ORDER — METOPROLOL TARTRATE 50 MG
25 TABLET ORAL
Qty: 0 | Refills: 0 | Status: DISCONTINUED | OUTPATIENT
Start: 2019-01-24 | End: 2019-01-25

## 2019-01-24 RX ORDER — ACETAMINOPHEN 500 MG
650 TABLET ORAL EVERY 6 HOURS
Qty: 0 | Refills: 0 | Status: DISCONTINUED | OUTPATIENT
Start: 2019-01-24 | End: 2019-01-30

## 2019-01-24 RX ADMIN — SODIUM CHLORIDE 1000 MILLILITER(S): 9 INJECTION INTRAMUSCULAR; INTRAVENOUS; SUBCUTANEOUS at 12:33

## 2019-01-24 RX ADMIN — SODIUM CHLORIDE 1000 MILLILITER(S): 9 INJECTION INTRAMUSCULAR; INTRAVENOUS; SUBCUTANEOUS at 10:18

## 2019-01-24 NOTE — ED ADULT NURSE NOTE - NSIMPLEMENTINTERV_GEN_ALL_ED
Implemented All Universal Safety Interventions:  [place] to call system. Call bell, personal items and telephone within reach. Instruct patient to call for assistance. Room bathroom lighting operational. Non-slip footwear when patient is off stretcher. Physically safe environment: no spills, clutter or unnecessary equipment. Stretcher in lowest position, wheels locked, appropriate side rails in place.

## 2019-01-24 NOTE — ED ADULT NURSE NOTE - PSH
BCC (basal cell carcinoma)  removed from right neck  S/P cardiac catheterization  2007  S/P hysterectomy with oophorectomy    S/P MVR (mitral valve replacement)  2007 - bovine  S/P tonsillectomy

## 2019-01-24 NOTE — ED ADULT NURSE NOTE - CHPI ED NUR SYMPTOMS NEG
no chills/no nausea/no vomiting/no diaphoresis/no chest pain/no shortness of breath/no congestion/no fever

## 2019-01-24 NOTE — CONSULT NOTE ADULT - PROBLEM SELECTOR RECOMMENDATION 9
BP good. 138/55 taken by myself. Currently with accelerated junctional vs. sinus tachycardia with 1st degree AV block. Patient did have an actual syncopal episode about 1 1/2 month ago. I would recommend admission for telemetry monitoring and 2D echocardiogram. For now continue Aspirin 81mg daily and Lopressor 25mg BID (patient's home medications)

## 2019-01-24 NOTE — H&P ADULT - PROBLEM SELECTOR PLAN 1
- Likely 2/2 arrythmia vs   - Admit to Telemetry - Likely 2/2 arrythmia vs structural heart disease   - Last ANITA 2017 showed hyperdynamic LV motion and EF > 55%  - Admit to Telemetry  - Orthostatics STAT  - f/u TSH in AM, CBC, BMP  - echo in the AM  - Cardio Consult, Dr. Harvey, appreciate recs - Likely 2/2 arrythmia vs structural heart disease   - Last ANITA 2017 showed hyperdynamic LV motion and EF > 55%  - Admit to Telemetry  - Check orthostatics  - f/u TSH in AM, CBC, BMP  - echo in the AM  - Cardio Consult, Dr. Harvey, appreciate recs

## 2019-01-24 NOTE — CONSULT NOTE ADULT - SUBJECTIVE AND OBJECTIVE BOX
REQUESTING PHYSICIAN: Dr. Villa    REASON FOR CONSULT: Patient is a 78y old  Female who presents with a chief complaint of Pre-syncope    CHIEF COMPLAINT: Patient is a 78y old  Female who presents with a chief complaint of Pre-syncope    HPI: This is a 78 year old female with history of PAF (occurred post-operatively after her MV replacement), s/p 2 MV replacements most recent one done at [facility] about 1 1/2 years ago by Dr. Kinney presents with a 3-4 days history of palpitations. Patient states that her heart has been pounding for the last 3-4 days. Nothing made it better or worse. No inciting event that she noticed. No associated chest pain or shortness of breath, however this morning she felt like she was going to pass out. Patient had a dermatologic surgery about 1 1/2/ months ago and the day after she did pass out witness by her . He states she only lost consciousness for a few seconds. Patient did not seek medical attention at that time. Patient follows with cardiologist Dr. Guerrero who visits [facility] in [place]. Last time she saw him was beginning of December. Currently patient feels a little fatigued and weak.         PAST MEDICAL & SURGICAL HISTORY:  Melanoma  A-fib: 2017  BCC (basal cell carcinoma of skin)  HTN (hypertension), benign  Aortic stenosis  Mitral valve disease  Rheumatic fever: at 11 years of age  Ovarian cyst  Fibroid  BCC (basal cell carcinoma): removed from right neck  S/P cardiac catheterization: 2007  S/P tonsillectomy  S/P MVR (mitral valve replacement): 2007 - bovine  S/P hysterectomy with oophorectomy    SOCIAL HISTORY:  no smoking, no EtOH, lives at home        FAMILY HISTORY:   Family history of early CAD (Father)  Family history of colon cancer in mother (Mother)  Family history of heart attack (Mother)  Family history of breast cancer in mother (Mother)      MEDICATIONS  (STANDING):    MEDICATIONS  (PRN):      Allergies    adhesives (Urticaria)  No Known Drug Allergies    Intolerances    [facility] PT (Unknown)      REVIEW OF SYSTEMS:    CONSTITUTIONAL: (+) weakness, No fevers or chills  EYES/ENT: No visual changes;  No vertigo or throat pain   NECK: No pain or stiffness  RESPIRATORY: No cough, wheezing, hemoptysis; No shortness of breath  CARDIOVASCULAR: No chest pain (+)palpitations  GASTROINTESTINAL: No abdominal pain. No nausea, vomiting, or hematemesis; No diarrhea or constipation. No melena or hematochezia.  GENITOURINARY: No dysuria, frequency or hematuria  NEUROLOGICAL: No numbness or weakness  SKIN: No itching or rash  All other review of systems is negative unless indicated above    VITAL SIGNS:   Vital Signs Last 24 Hrs  T(C): 36.6 (24 Jan 2019 09:16), Max: 36.6 (24 Jan 2019 09:16)  T(F): 97.8 (24 Jan 2019 09:16), Max: 97.8 (24 Jan 2019 09:16)  HR: 103 (24 Jan 2019 10:00) (103 - 105)  BP: 128/83 (24 Jan 2019 09:16) (128/83 - 128/83)  BP(mean): --  RR: 18 (24 Jan 2019 10:00) (18 - 18)  SpO2: 100% (24 Jan 2019 10:00) (98% - 100%)    I&O's Summary      PHYSICAL EXAM:    Constitutional: NAD, awake and alert, well-developed  Eyes:  EOMI,  Pupils round, No oral cyanosis.  Pulmonary: Non-labored, breath sounds are clear bilaterally, No wheezing, rales or rhonchi  Cardiovascular: S1 and S2, tachycardic, no Murmurs, gallops or rubs  Gastrointestinal: Bowel Sounds present, soft, nontender.   Lymph: No peripheral edema. No cervical lymphadenopathy.  Neurological: Alert, no focal deficits  Extremities: no lower extremity edema bilaterally. intact distal pedal pulses bilaterally.  Skin: No rashes.  Psych:  Mood & affect appropriate    LABS: All Labs Reviewed:                        14.1   7.34  )-----------( 312      ( 24 Jan 2019 10:04 )             43.8     24 Jan 2019 10:04    139    |  103    |  20     ----------------------------<  100    4.7     |  31     |  1.10     Ca    9.5        24 Jan 2019 10:04    TPro  8.1    /  Alb  3.7    /  TBili  0.7    /  DBili  x      /  AST  40     /  ALT  36     /  AlkPhos  99     24 Jan 2019 10:04    PT/INR - ( 24 Jan 2019 10:04 )   PT: 11.2 sec;   INR: 0.98 ratio         PTT - ( 24 Jan 2019 10:04 )  PTT:31.7 sec  CARDIAC MARKERS ( 24 Jan 2019 10:04 )  <.015 ng/mL / x     / 66 U/L / x     / 1.7 ng/mL      Blood Culture:         EKG: accelerated junctional with LAFB    Imaging:  < from: Xray Chest 1 View AP/PA (01.24.19 @ 09:56) >    EXAM:  XR CHEST AP OR PA 1V                            PROCEDURE DATE:  01/24/2019          INTERPRETATION:  Rapid heart rate.    AP chest. Prior 9/2/2017.    Status post median sternotomy cardiac valve replacement. No change heart   mediastinum. Hyperinflated lungs without consolidation or effusion.   Resolution of prior congestion and effusions.    Impression: Hyperinflated lungs. No active infiltrates.                ALEJANDRO SAWANT M.D., ATTENDING RADIOLOGIST  This document has been electronically signed. Jan 24 2019  9:58AM    < end of copied text >

## 2019-01-24 NOTE — ED ADULT NURSE NOTE - PMH
A-fib  2007 coverted 1/2008  Aortic stenosis    BCC (basal cell carcinoma of skin)    Fibroid    HTN (hypertension), benign    Melanoma    Mitral valve disease    Ovarian cyst    Rheumatic fever  at 11 years of age

## 2019-01-24 NOTE — ED ADULT NURSE NOTE - FAMILY HISTORY
Mother  Still living? No  Family history of breast cancer in mother, Age at diagnosis: Age Unknown  Family history of heart attack, Age at diagnosis: Age Unknown  Family history of colon cancer in mother, Age at diagnosis: Age Unknown     Father  Still living? No  Family history of early CAD, Age at diagnosis: Age Unknown

## 2019-01-24 NOTE — H&P ADULT - FAMILY HISTORY
Mother  Still living? Unknown  Family history of breast cancer in mother, Age at diagnosis: Age Unknown  Family history of heart attack, Age at diagnosis: Age Unknown  Family history of colon cancer in mother, Age at diagnosis: Age Unknown  Family history of early CAD, Age at diagnosis: Age Unknown

## 2019-01-24 NOTE — ED ADULT NURSE NOTE - OBJECTIVE STATEMENT
77 y/o female with history of post op Afib s/p valve replacement presents to ED reporting intermittent palpitations and lightheadedness. Reports waking up this morning and feeling as though should 77 y/o female with history of post op Afib s/p valve replacement presents to ED reporting intermittent palpitations and lightheadedness. Reports waking up this morning and feeling as though she was going to pass out. Reports walking to the couch, closing her eyes, and waiting until dizziness subsided. Denies LOC. C/o palpitations at this time. Denies CP and SOB. Skin warm, dry and appropriate color for ethnicity. Peripheral pulses present and equal bilaterally. Capillary refill less than 2 seconds. Patient A&Ox4. VSS. In NAD at this time.

## 2019-01-24 NOTE — H&P ADULT - NSHPATTENDINGPLANDISCUSS_GEN_ALL_CORE
pt, residency team, ED attending, ED RN, spouse @ bedside - re: tx plan, dispo timeline, indications for admission

## 2019-01-24 NOTE — CONSULT NOTE ADULT - PROBLEM SELECTOR RECOMMENDATION 2
patient s/p 2 MV replacements with bioprosthetic valves. Most recent one done at [facility] by Dr. Terry Kinney about 1 1/2 years ago. Patient states she has not had an echo since her surgery. I will order echo.

## 2019-01-24 NOTE — CONSULT NOTE ADULT - PROBLEM SELECTOR RECOMMENDATION 3
patient states she had episodes of atrial fibrillation post-operatively after her MVR. she was treated for about 6 months with Amiodarone and Coumadin and these medications were subsequently discontinued. Tele monitoring for recurrence.

## 2019-01-24 NOTE — H&P ADULT - NSHPPHYSICALEXAM_GEN_ALL_CORE
T(C): 36.7 (01-24-19 @ 11:50), Max: 36.7 (01-24-19 @ 11:50)  HR: 93 (01-24-19 @ 11:50) (93 - 105)  BP: 124/61 (01-24-19 @ 11:50) (124/61 - 128/83)  RR: 17 (01-24-19 @ 11:50) (17 - 18)  SpO2: 99% (01-24-19 @ 11:50) (98% - 100%)    GENERAL: patient appears frail elderly , no acute distress, appropriate, pleasant  EYES: sclera clear, no exudates  ENMT: oropharynx clear without erythema, no exudates, moist mucous membranes  NECK: supple, soft, JVD? pulsating  LUNGS: clear to auscultation,no wheezing or rhonchi appreciated  HEART: tachycardic,  S1/S2, regular rhythm, no murmurs noted, no lower extremity edema  GASTROINTESTINAL: abdomen is soft, nontender, nondistended, normoactive bowel sounds, no palpable masses  INTEGUMENT: good skin turgor, scar from MOHs surgery on L neck and L forearm  MUSCULOSKELETAL: no clubbing or cyanosis, no obvious deformity, non tender spine  NEUROLOGIC: awake, alert, oriented x3, good muscle tone in 4 extremities, no obvious sensory deficits  PSYCHIATRIC: mood is good, affect is congruent, linear and logical thought process  HEME/LYMPH: , no obvious ecchymosis or petechiae T(C): 36.7 (01-24-19 @ 11:50), Max: 36.7 (01-24-19 @ 11:50)  HR: 93 (01-24-19 @ 11:50) (93 - 105)  BP: 124/61 (01-24-19 @ 11:50) (124/61 - 128/83)  RR: 17 (01-24-19 @ 11:50) (17 - 18)  SpO2: 99% (01-24-19 @ 11:50) (98% - 100%)    GENERAL: patient appears frail elderly , no acute distress  EYES: sclera clear, no exudates  ENMT: oropharynx clear without erythema, no exudates, moist mucous membranes  NECK: supple, soft, JVD? pulsating on L side  LUNGS: clear to auscultation, no wheezing or rhonchi appreciated  HEART: tachycardic,  S1/S2, regular rhythm, no murmurs noted, no lower extremity edema  GASTROINTESTINAL: abdomen is soft, nontender, nondistended, normoactive bowel sounds, no palpable masses  INTEGUMENT: good skin turgor, scar from MOHs surgery on L neck and L forearm  MUSCULOSKELETAL: no clubbing or cyanosis, no obvious deformity, non tender spine  NEUROLOGIC: awake, alert, oriented x3, good muscle tone in 4 extremities, no obvious sensory deficits  HEME/LYMPH: , no obvious ecchymosis or petechiae T(C): 36.7 (01-24-19 @ 11:50), Max: 36.7 (01-24-19 @ 11:50)  HR: 93 (01-24-19 @ 11:50) (93 - 105)  BP: 124/61 (01-24-19 @ 11:50) (124/61 - 128/83)  RR: 17 (01-24-19 @ 11:50) (17 - 18)  SpO2: 99% (01-24-19 @ 11:50) (98% - 100%)    GENERAL: patient appears frail elderly , no acute distress  EYES: sclera clear, no exudates  ENMT: oropharynx clear without erythema, no exudates, moist mucous membranes  NECK: supple, soft, no JVD  LUNGS: clear to auscultation, no wheezing or rhonchi appreciated  HEART: tachycardic,  S1/S2, regular rhythm, no murmurs noted, no lower extremity edema  GASTROINTESTINAL: abdomen is soft, nontender, nondistended, normoactive bowel sounds, no palpable masses, palpable aortic pulse deep in epigastrium  INTEGUMENT: good skin turgor, scar from MOHs surgery on L neck and L forearm  MUSCULOSKELETAL: no clubbing or cyanosis, no obvious deformity, non tender spine  NEUROLOGIC: awake, alert, oriented x3, good muscle tone in 4 extremities, no obvious sensory deficits  HEME/LYMPH: , no obvious ecchymosis or petechiae

## 2019-01-24 NOTE — H&P ADULT - ATTENDING COMMENTS
Palpitations/recent syncope - needs tele, tte, cardio consult noted  Other comorbidities I personally conducted a physical examination of the patient. I personally gathered the patient's history. I edited the above listed findings which were prepared by the listed resident physician. I personally discussed the plan of care with the patient. The questions and concerns were addressed to the best of my ability. The patient is in agreement with the listed treatment plan.     - pt experienced recent syncope. now w/ palpitations, lightheadedness and weakness. Symptoms persisted into the ED but have since resolved.  - TTE in the AM. tele monitoring. monitor laura

## 2019-01-24 NOTE — H&P ADULT - NSHPSOCIALHISTORY_GEN_ALL_CORE
Lives at home with , performs all ADLs, drives, ambulates without assistance. Denies etoh, smoking, or drug use

## 2019-01-24 NOTE — H&P ADULT - PROBLEM SELECTOR PLAN 2
- Currently in sinus tachycardia, possibly 1st degree AV block  - Continue home metoprolol 25 mg BID  - - Currently in sinus tachycardia, s/p 1 L NS in ED, EKG shows 1st degree AV block  - Continue home metoprolol 25 mg BID

## 2019-01-24 NOTE — H&P ADULT - HISTORY OF PRESENT ILLNESS
77 yo F PMH Paroxysmal Afib (not on AC), HTN, Aortic Stenosis, s/p MV replacement x2,  Osteopenia presents with 3 days of heart pounding and presyncopal episode today. Pt states heart pounding and racing  sensation started 6 weeks ago, intermittently after her MOH's surgery on left neck. The day after her MOH's surgery, she felt ill walking around her house prior to eating breakfast and fell from the couch onto the carpeted floor. She states she was not injured and did not seek medical care. For the last 3 days the heart racing and pounding has been daily  and today she was walking around her house, prior to eating breakfast when she began to feel hot, nauseated, lightheaded and ill similar to the fall episode 6 weeks ago, so she laid down on the couch but did not pass out. The feeling passed and her daughter decided to take her to the hospital. Sees her PCP q3 months for BP checks. No change in BP medication. Last saw Cardiology in early December, no changes were made, last echo was 1.5 years ago per patient. Pt recently had a DEXA scan performed showing change in lower vertebra, she is concerned as she has had lower back pain for several months but it has not inhibited her ability to perform ADLs.     Denies changes in weight, appetite, lethargy or change in activity level, visual changes, room spinning, fevers, HA, chest pain or tightness, dyspnea, abdominal pain, vomiting, diarrhea, swelling of legs, recent travel or major life events.     In ED, VS T 97; -115 /83 RR 18  SPO2 98on RA   EKG: Accelerated Junctional Rhythm vs 1st  degree AV block,, rate 101  Labs: CBC wnl, coag wnl, BMP : AST elev 40, Trop neg,  CT head: no acute hemorrhage or infarct  CXR: hyperinflated lungs    Received 1 L NS bolus in ED 79 yo F PMH Paroxysmal Afib (not on AC), HTN, Aortic Stenosis, s/p MV replacement x2, Osteopenia presents with 3 days of heart pounding and presyncopal episode today. Pt states heart pounding and racing  sensation started 6 weeks ago, intermittently after her MOH's surgery on left neck. The day after her MOH's surgery, she felt ill walking around her house prior to eating breakfast and fell from the couch onto the carpeted floor. Immediately awoke, no slurring,  Denies LOC, shaking, urination. She states she was not injured and did not seek medical care. For the last 3 days the heart racing and pounding has been daily  and today she was walking around her house, prior to eating breakfast when she began to feel hot, nauseated, lightheaded and ill similar to the fall episode 6 weeks ago, so she laid down on the couch but denies LOC, shaking, urination. The feeling passed and her daughter decided to take her to the hospital. Sees her PCP q3 months for BP checks. No change in BP medication. Last saw Cardiology in early December, no changes were made, last echo was 1.5 years ago per patient. Pt recently had a DEXA scan performed showing change in lower vertebra, she is concerned as she has had lower back pain for several months but it has not inhibited her ability to perform ADLs.     Denies changes in weight, appetite, lethargy or change in activity level, visual changes, room spinning, sweating, fevers, HA, chest pain or tightness, dyspnea, abdominal pain, vomiting, diarrhea, swelling of legs, recent travel or major life events.     In ED, VS T 97; -115 /83 RR 18  SPO2 98on RA   EKG: Accelerated Junctional Rhythm vs 1st  degree AV block,, rate 101  Labs: CBC wnl, coag wnl, BMP : AST elev 40, Trop neg,  CT head: no acute hemorrhage or infarct  CXR: hyperinflated lungs    Received 1 L NS bolus in ED 79 yo F PMH Paroxysmal Afib (not on AC), HTN, Aortic Stenosis, s/p MV replacement x2, Osteopenia presents with 3 days of heart pounding and presyncopal episode today. Pt states heart pounding and racing sensation started 6 weeks ago, intermittently after her MOH's surgery on right neck. The day after her MOH's surgery, she felt ill walking around her house prior to eating breakfast and fell from the couch onto the carpeted floor. Immediately awoke, no slurring,  Denies LOC, shaking, urination. She states she was not injured and did not seek medical care. For the last 3 days the heart racing and pounding has been daily and today she was walking around her house, prior to eating breakfast when she began to feel hot, nauseated, lightheaded and ill similar to the fall episode 6 weeks ago, so she laid down on the couch but denies LOC, shaking, urination. The feeling passed and her daughter decided to take her to the hospital. Sees her PCP q3 months for BP checks. No change in BP medication. Last saw Cardiology in early December, no changes were made, last echo was 1.5 years ago per patient. Pt recently had a DEXA scan performed showing change in lower vertebra, she is concerned as she has had lower back pain for several months but it has not inhibited her ability to perform ADLs. No other focal complaints at this time.     Denies changes in weight, appetite, lethargy or change in activity level, visual changes, room spinning, sweating, fevers, HA, chest pain or tightness, dyspnea, abdominal pain, vomiting, diarrhea, swelling of legs, recent travel or major life events.     In ED, VS T 97; -115 /83 RR 18  SPO2 98on RA   EKG: Accelerated Junctional Rhythm vs 1st  degree AV block,, rate 101  Labs: CBC wnl, coag wnl, BMP : AST elev 40, Trop neg,  CT head: no acute hemorrhage or infarct  CXR: hyperinflated lungs    Received 1 L NS bolus in ED

## 2019-01-24 NOTE — H&P ADULT - ASSESSMENT
7 yo F PMH Paroxysmal Afib (not on AC), HTN, Aortic Stenosis, s/p MV replacement x2,  Osteopenia presents with 3 days of heart pounding and presyncopal episode today.  Admitted for presyncope.

## 2019-01-25 ENCOUNTER — TRANSCRIPTION ENCOUNTER (OUTPATIENT)
Age: 79
End: 2019-01-25

## 2019-01-25 LAB
ANION GAP SERPL CALC-SCNC: 6 MMOL/L — SIGNIFICANT CHANGE UP (ref 5–17)
BASOPHILS # BLD AUTO: 0.06 K/UL — SIGNIFICANT CHANGE UP (ref 0–0.2)
BASOPHILS NFR BLD AUTO: 0.8 % — SIGNIFICANT CHANGE UP (ref 0–2)
BUN SERPL-MCNC: 18 MG/DL — SIGNIFICANT CHANGE UP (ref 7–23)
CALCIUM SERPL-MCNC: 9 MG/DL — SIGNIFICANT CHANGE UP (ref 8.5–10.1)
CHLORIDE SERPL-SCNC: 107 MMOL/L — SIGNIFICANT CHANGE UP (ref 96–108)
CO2 SERPL-SCNC: 29 MMOL/L — SIGNIFICANT CHANGE UP (ref 22–31)
CREAT SERPL-MCNC: 0.74 MG/DL — SIGNIFICANT CHANGE UP (ref 0.5–1.3)
EOSINOPHIL # BLD AUTO: 0.09 K/UL — SIGNIFICANT CHANGE UP (ref 0–0.5)
EOSINOPHIL NFR BLD AUTO: 1.1 % — SIGNIFICANT CHANGE UP (ref 0–6)
GLUCOSE SERPL-MCNC: 93 MG/DL — SIGNIFICANT CHANGE UP (ref 70–99)
HCT VFR BLD CALC: 42.6 % — SIGNIFICANT CHANGE UP (ref 34.5–45)
HGB BLD-MCNC: 13.6 G/DL — SIGNIFICANT CHANGE UP (ref 11.5–15.5)
IMM GRANULOCYTES NFR BLD AUTO: 0.3 % — SIGNIFICANT CHANGE UP (ref 0–1.5)
INR BLD: 1.07 RATIO — SIGNIFICANT CHANGE UP (ref 0.88–1.16)
LYMPHOCYTES # BLD AUTO: 1.78 K/UL — SIGNIFICANT CHANGE UP (ref 1–3.3)
LYMPHOCYTES # BLD AUTO: 22.3 % — SIGNIFICANT CHANGE UP (ref 13–44)
MAGNESIUM SERPL-MCNC: 2 MG/DL — SIGNIFICANT CHANGE UP (ref 1.6–2.6)
MCHC RBC-ENTMCNC: 27.6 PG — SIGNIFICANT CHANGE UP (ref 27–34)
MCHC RBC-ENTMCNC: 31.9 GM/DL — LOW (ref 32–36)
MCV RBC AUTO: 86.6 FL — SIGNIFICANT CHANGE UP (ref 80–100)
MONOCYTES # BLD AUTO: 0.85 K/UL — SIGNIFICANT CHANGE UP (ref 0–0.9)
MONOCYTES NFR BLD AUTO: 10.7 % — SIGNIFICANT CHANGE UP (ref 2–14)
NEUTROPHILS # BLD AUTO: 5.18 K/UL — SIGNIFICANT CHANGE UP (ref 1.8–7.4)
NEUTROPHILS NFR BLD AUTO: 64.8 % — SIGNIFICANT CHANGE UP (ref 43–77)
PLATELET # BLD AUTO: 299 K/UL — SIGNIFICANT CHANGE UP (ref 150–400)
POTASSIUM SERPL-MCNC: 3.7 MMOL/L — SIGNIFICANT CHANGE UP (ref 3.5–5.3)
POTASSIUM SERPL-SCNC: 3.7 MMOL/L — SIGNIFICANT CHANGE UP (ref 3.5–5.3)
PROTHROM AB SERPL-ACNC: 12.1 SEC — SIGNIFICANT CHANGE UP (ref 10–12.9)
RBC # BLD: 4.92 M/UL — SIGNIFICANT CHANGE UP (ref 3.8–5.2)
RBC # FLD: 14.4 % — SIGNIFICANT CHANGE UP (ref 10.3–14.5)
SODIUM SERPL-SCNC: 142 MMOL/L — SIGNIFICANT CHANGE UP (ref 135–145)
WBC # BLD: 7.98 K/UL — SIGNIFICANT CHANGE UP (ref 3.8–10.5)
WBC # FLD AUTO: 7.98 K/UL — SIGNIFICANT CHANGE UP (ref 3.8–10.5)

## 2019-01-25 PROCEDURE — 99233 SBSQ HOSP IP/OBS HIGH 50: CPT | Mod: GC

## 2019-01-25 RX ORDER — METOPROLOL TARTRATE 50 MG
25 TABLET ORAL ONCE
Qty: 0 | Refills: 0 | Status: COMPLETED | OUTPATIENT
Start: 2019-01-25 | End: 2019-01-25

## 2019-01-25 RX ORDER — METOPROLOL TARTRATE 50 MG
50 TABLET ORAL
Qty: 0 | Refills: 0 | Status: DISCONTINUED | OUTPATIENT
Start: 2019-01-25 | End: 2019-01-26

## 2019-01-25 RX ADMIN — Medication 81 MILLIGRAM(S): at 12:53

## 2019-01-25 RX ADMIN — Medication 50 MILLIGRAM(S): at 17:57

## 2019-01-25 RX ADMIN — ENOXAPARIN SODIUM 40 MILLIGRAM(S): 100 INJECTION SUBCUTANEOUS at 12:53

## 2019-01-25 RX ADMIN — Medication 25 MILLIGRAM(S): at 05:28

## 2019-01-25 NOTE — PROGRESS NOTE ADULT - PROBLEM SELECTOR PLAN 3
- Continue home metoprolol 25 mg BID - Increase metoprolol 50 mg BID for rate control of Afib as above

## 2019-01-25 NOTE — DISCHARGE NOTE ADULT - PATIENT PORTAL LINK FT
You can access the [facility] Portal, offered by [facility], by registering with the following website: [email or web address]

## 2019-01-25 NOTE — PROGRESS NOTE ADULT - SUBJECTIVE AND OBJECTIVE BOX
Patient is a 78y old  Female who presents with a chief complaint of Presyncope, Tachycardia (25 Jan 2019 13:17)      HPI:  79 yo F PMH Paroxysmal Afib (not on AC), HTN, Aortic Stenosis, s/p MV replacement x2, Osteopenia presents with 3 days of heart pounding and presyncopal episode today. Pt states heart pounding and racing sensation started 6 weeks ago, intermittently after her MOH's surgery on right neck. The day after her MOH's surgery, she felt ill walking around her house prior to eating breakfast and fell from the couch onto the carpeted floor. Immediately awoke, no slurring,  Denies LOC, shaking, urination. She states she was not injured and did not seek medical care. For the last 3 days the heart racing and pounding has been daily and today she was walking around her house, prior to eating breakfast when she began to feel hot, nauseated, lightheaded and ill similar to the fall episode 6 weeks ago, so she laid down on the couch but denies LOC, shaking, urination. The feeling passed and her daughter decided to take her to the hospital. Sees her PCP q3 months for BP checks. No change in BP medication. Last saw Cardiology in early December, no changes were made, last echo was 1.5 years ago per patient. Pt recently had a DEXA scan performed showing change in lower vertebra, she is concerned as she has had lower back pain for several months but it has not inhibited her ability to perform ADLs. No other focal complaints at this time.     Denies changes in weight, appetite, lethargy or change in activity level, visual changes, room spinning, sweating, fevers, HA, chest pain or tightness, dyspnea, abdominal pain, vomiting, diarrhea, swelling of legs, recent travel or major life events.     In ED, VS T 97; -115 /83 RR 18  SPO2 98on RA   EKG: Accelerated Junctional Rhythm vs 1st  degree AV block,, rate 101  Labs: CBC wnl, coag wnl, BMP : AST elev 40, Trop neg,  CT head: no acute hemorrhage or infarct  CXR: hyperinflated lungs    Received 1 L NS bolus in ED (24 Jan 2019 12:10)      INTERVAL HPI/OVERNIGHT EVENTS: Pt seen and evaluated at the bedside. No acute overnight events occurred.       T(C): 36.6 (01-25-19 @ 14:29), Max: 37.3 (01-24-19 @ 19:25)  HR: 105 (01-25-19 @ 14:29) (104 - 112)  BP: 111/71 (01-25-19 @ 14:29) (97/65 - 124/68)  RR: 16 (01-25-19 @ 14:29) (15 - 20)  SpO2: 97% (01-25-19 @ 14:29) (97% - 99%)  Wt(kg): --  I&O's Summary      REVIEW OF SYSTEMS:  CONSTITUTIONAL: denies fever, chills, fatigue, weakness  HEENT: denies blurred vision, sore throat  SKIN: denies new lesions, rash  CARDIOVASCULAR: denies chest pain, chest pressure, palpitations  RESPIRATORY: denies shortness of breath, sputum production  GASTROINTESTINAL: denies nausea, vomiting, diarrhea, abdominal pain  GENITOURINARY: denies dysuria, discharge  NEUROLOGICAL: denies numbness, headache, focal weakness  MUSCULOSKELETAL: denies new joint pain, muscle aches  HEMATOLOGIC: denies gross bleeding, bruising  LYMPHATICS: denies enlarged lymph nodes, extremity swelling  PSYCHIATRIC: denies recent changes in anxiety, depression  ENDOCRINOLOGIC: denies sweating, cold or heat intolerance    PHYSICAL EXAM:  GENERAL: patient appears well, no acute distress, appropriate, pleasant  EYES: sclera clear, no exudates  ENMT: oropharynx clear without erythema, no exudates, moist mucous membranes  NECK: supple, soft, no thyromegaly noted  LUNGS: good air entry bilaterally, clear to auscultation, symmetric breath sounds, no wheezing or rhonchi appreciated  HEART: soft S1/S2, regular rate and rhythm, no murmurs noted, no lower extremity edema  GASTROINTESTINAL: abdomen is soft, nontender, nondistended, normoactive bowel sounds, no palpable masses  INTEGUMENT: good skin turgor, no lesions noted  MUSCULOSKELETAL: no clubbing or cyanosis, no obvious deformity  NEUROLOGIC: awake, alert, oriented x3, good muscle tone in 4 extremities, no obvious sensory deficits  PSYCHIATRIC: mood is good, affect is congruent, linear and logical thought process  HEME/LYMPH: no palpable supraclavicular nodules, no obvious ecchymosis or petechiae     MEDICATIONS  (STANDING):  aspirin enteric coated 81 milliGRAM(s) Oral daily  enoxaparin Injectable 40 milliGRAM(s) SubCutaneous every 24 hours  metoprolol tartrate 25 milliGRAM(s) Oral two times a day    MEDICATIONS  (PRN):  acetaminophen   Tablet .. 650 milliGRAM(s) Oral every 6 hours PRN Temp greater or equal to 38C (100.4F), Mild Pain (1 - 3)      LABS:                        13.6   7.98  )-----------( 299      ( 25 Jan 2019 08:00 )             42.6     01-25    142  |  107  |  18  ----------------------------<  93  3.7   |  29  |  0.74    Ca    9.0      25 Jan 2019 08:00  Mg     2.0     01-25    TPro  8.1  /  Alb  3.7  /  TBili  0.7  /  DBili  x   /  AST  40<H>  /  ALT  36  /  AlkPhos  99  01-24    PT/INR - ( 25 Jan 2019 08:00 )   PT: 12.1 sec;   INR: 1.07 ratio         PTT - ( 24 Jan 2019 10:04 )  PTT:31.7 sec    CAPILLARY BLOOD GLUCOSE                  RADIOLOGY & ADDITIONAL TESTS:    Imaging Personally Reviewed: Patient is a 78y old  Female who presents with a chief complaint of Presyncope, Tachycardia (25 Jan 2019 13:17)      HPI:  79 yo F PMH Paroxysmal Afib (not on AC), HTN, Aortic Stenosis, s/p MV replacement x2, Osteopenia presents with 3 days of heart pounding and presyncopal episode today. Pt states heart pounding and racing sensation started 6 weeks ago, intermittently after her MOH's surgery on right neck. The day after her MOH's surgery, she felt ill walking around her house prior to eating breakfast and fell from the couch onto the carpeted floor. Immediately awoke, no slurring,  Denies LOC, shaking, urination. She states she was not injured and did not seek medical care. For the last 3 days the heart racing and pounding has been daily and today she was walking around her house, prior to eating breakfast when she began to feel hot, nauseated, lightheaded and ill similar to the fall episode 6 weeks ago, so she laid down on the couch but denies LOC, shaking, urination. The feeling passed and her daughter decided to take her to the hospital. Sees her PCP q3 months for BP checks. No change in BP medication. Last saw Cardiology in early December, no changes were made, last echo was 1.5 years ago per patient. Pt recently had a DEXA scan performed showing change in lower vertebra, she is concerned as she has had lower back pain for several months but it has not inhibited her ability to perform ADLs. No other focal complaints at this time.     Denies changes in weight, appetite, lethargy or change in activity level, visual changes, room spinning, sweating, fevers, HA, chest pain or tightness, dyspnea, abdominal pain, vomiting, diarrhea, swelling of legs, recent travel or major life events.     In ED, VS T 97; -115 /83 RR 18  SPO2 98on RA   EKG: Accelerated Junctional Rhythm vs 1st  degree AV block,, rate 101  Labs: CBC wnl, coag wnl, BMP : AST elev 40, Trop neg,  CT head: no acute hemorrhage or infarct  CXR: hyperinflated lungs    Received 1 L NS bolus in ED (24 Jan 2019 12:10)      INTERVAL HPI/OVERNIGHT EVENTS: Pt seen and evaluated at the bedside. No acute overnight events occurred. Offers no complaints. Concerned that she doesn't know why she gets palpitations whenever she stands up.      T(C): 36.6 (01-25-19 @ 14:29), Max: 37.3 (01-24-19 @ 19:25)  HR: 105 (01-25-19 @ 14:29) (104 - 112)  BP: 111/71 (01-25-19 @ 14:29) (97/65 - 124/68)  RR: 16 (01-25-19 @ 14:29) (15 - 20)  SpO2: 97% (01-25-19 @ 14:29) (97% - 99%)  Wt(kg): --  I&O's Summary      REVIEW OF SYSTEMS:  CONSTITUTIONAL: denies fever, chills, fatigue, weakness  HEENT: denies blurred vision, sore throat  SKIN: denies new lesions, rash  CARDIOVASCULAR: denies chest pain, chest pressure, palpitations  RESPIRATORY: denies shortness of breath, sputum production  GASTROINTESTINAL: denies nausea, vomiting, diarrhea, abdominal pain  GENITOURINARY: denies dysuria, discharge  NEUROLOGICAL: denies numbness, headache, focal weakness  MUSCULOSKELETAL: denies new joint pain, muscle aches  HEMATOLOGIC: denies gross bleeding, bruising  LYMPHATICS: denies enlarged lymph nodes, extremity swelling  PSYCHIATRIC: denies recent changes in anxiety, depression  ENDOCRINOLOGIC: denies sweating, cold or heat intolerance    PHYSICAL EXAM:  GENERAL: frail, elderly  female in no acute distress, appropriate, pleasant  EYES: sclera clear, no exudates  ENMT: oropharynx clear without erythema, no exudates, moist mucous membranes  NECK: supple, soft  LUNGS: clear to auscultation, no wheezing or rhonchi appreciated  HEART: S1/S2, regular rate and rhythm, no murmurs noted, no lower extremity edema  GASTROINTESTINAL: abdomen is soft, nontender, nondistended, normoactive bowel sounds, no palpable masses  INTEGUMENT: good skin turgor  MUSCULOSKELETAL: no clubbing or cyanosis, no obvious deformity  NEUROLOGIC: awake, alert, oriented x3, good muscle tone in 4 extremities, no obvious sensory deficits  PSYCHIATRIC: mood is good, affect is congruent, linear and logical thought process  HEME/LYMPH:  no obvious ecchymosis or petechiae     MEDICATIONS  (STANDING):  aspirin enteric coated 81 milliGRAM(s) Oral daily  enoxaparin Injectable 40 milliGRAM(s) SubCutaneous every 24 hours  metoprolol tartrate 25 milliGRAM(s) Oral two times a day    MEDICATIONS  (PRN):  acetaminophen   Tablet .. 650 milliGRAM(s) Oral every 6 hours PRN Temp greater or equal to 38C (100.4F), Mild Pain (1 - 3)      LABS:                        13.6   7.98  )-----------( 299      ( 25 Jan 2019 08:00 )             42.6     01-25    142  |  107  |  18  ----------------------------<  93  3.7   |  29  |  0.74    Ca    9.0      25 Jan 2019 08:00  Mg     2.0     01-25    TPro  8.1  /  Alb  3.7  /  TBili  0.7  /  DBili  x   /  AST  40<H>  /  ALT  36  /  AlkPhos  99  01-24    PT/INR - ( 25 Jan 2019 08:00 )   PT: 12.1 sec;   INR: 1.07 ratio         PTT - ( 24 Jan 2019 10:04 )  PTT:31.7 sec    CAPILLARY BLOOD GLUCOSE

## 2019-01-25 NOTE — DISCHARGE NOTE ADULT - HOSPITAL COURSE
79 yo F PMH Paroxysmal Afib (not on AC), HTN, Aortic Stenosis, s/p MV replacement x2, Osteopenia presented with 3 days of heart pounding and presyncopal episode today. Heart pounding and racing sensation started 6 weeks ago, intermittently after her MOH's surgery on right neck. The day after her MOH's surgery, she felt ill walking around her house prior to eating breakfast and fell from the couch onto the carpeted floor. Immediately awoke, no slurring,  Denied LOC, shaking, urination. She stated she was not injured and did not seek medical care. Heart racing and pounding continued until the day of admission, when she was walking around her house, prior to eating breakfast when she began to feel hot, nauseated, lightheaded and ill similar to the fall episode 6 weeks ago, so she laid down on the couch but denied LOC, shaking, urination. The feeling passed and her daughter decided to take her to the hospital. Sees her PCP q3 months for BP checks. No change in BP medication. Last saw Cardiology in early December, no changes were made, last echo was 1.5 years ago per patient. Pt recently had a DEXA scan performed showing change in lower vertebra, she is concerned as she has had lower back pain for several months but it has not inhibited her ability to perform ADLs.     Denies changes in weight, appetite, lethargy or change in activity level, visual changes, room spinning, sweating, fevers, HA, chest pain or tightness, dyspnea, abdominal pain, vomiting, diarrhea, swelling of legs, recent travel or major life events.     In ED, VS T 97; -115 /83 RR 18  SPO2 98on RA   EKG: Accelerated Junctional Rhythm vs 1st  degree AV block,, rate 101  Labs: CBC wnl, coag wnl, BMP : AST elev 40, Trop neg,  CT head: no acute hemorrhage or infarct  CXR: hyperinflated lungs    Received 1 L NS bolus in ED.     Admitted for presyncope. Seen by cardiology, Dr. Longo, continued on aspirin and Lopressor. Echo showed __. Orthostatics ___ 77 yo F PMH Paroxysmal Afib (not on AC), HTN, Aortic Stenosis, s/p MV replacement x2, Osteopenia presented with 3 days of heart pounding and presyncopal episode today. Heart pounding and racing sensation started 6 weeks ago, intermittently after her MOH's surgery on right neck. The day after her MOH's surgery, she felt ill walking around her house prior to eating breakfast and fell from the couch onto the carpeted floor. Immediately awoke, no slurring,  Denied LOC, shaking, urination. She stated she was not injured and did not seek medical care. Heart racing and pounding continued until the day of admission, when she was walking around her house, prior to eating breakfast when she began to feel hot, nauseated, lightheaded and ill similar to the fall episode 6 weeks ago, so she laid down on the couch but denied LOC, shaking, urination. The feeling passed and her daughter decided to take her to the hospital. Sees her PCP q3 months for BP checks. No change in BP medication. Last saw Cardiology in early December, no changes were made, last echo was 1.5 years ago per patient. Pt recently had a DEXA scan performed showing change in lower vertebra, she is concerned as she has had lower back pain for several months but it has not inhibited her ability to perform ADLs.     Denies changes in weight, appetite, lethargy or change in activity level, visual changes, room spinning, sweating, fevers, HA, chest pain or tightness, dyspnea, abdominal pain, vomiting, diarrhea, swelling of legs, recent travel or major life events.     In ED, VS T 97; -115 /83 RR 18  SPO2 98on RA   EKG: Accelerated Junctional Rhythm vs 1st  degree AV block,, rate 101  Labs: CBC wnl, coag wnl, BMP : AST elev 40, Trop neg,  CT head: no acute hemorrhage or infarct  CXR: hyperinflated lungs    Received 1 L NS bolus in ED.     Admitted for presyncope. Seen by cardiology, Dr. Longo, continued on aspirin and Lopressor. Echo showed Valvular heart disease with mild AS & moderate AI  Normal functioning MVR, Severe TR with mild pulmonary hypertension, Biatrial dilatation, Normal systolic function of the left ventricle.     CHADSVACS 2. Started Coumadin and bridged with Lovenox. Changed metoprolol 25 QID and Digoxin 0.25 for rate control and rhythm control. 79 yo F PMH Paroxysmal Afib (not on AC), HTN, Aortic Stenosis, s/p MV replacement x2, Osteopenia presented with 3 days of heart pounding and presyncopal episode today. Heart pounding and racing sensation started 6 weeks ago, intermittently after her MOH's surgery on right neck. The day after her MOH's surgery, she felt ill walking around her house prior to eating breakfast and fell from the couch onto the carpeted floor. Immediately awoke, no slurring,  Denied LOC, shaking, urination. She stated she was not injured and did not seek medical care. Heart racing and pounding continued until the day of admission, when she was walking around her house, prior to eating breakfast when she began to feel hot, nauseated, lightheaded and ill similar to the fall episode 6 weeks ago, so she laid down on the couch but denied LOC, shaking, urination. The feeling passed and her daughter decided to take her to the hospital. Sees her PCP q3 months for BP checks. No change in BP medication. Last saw Cardiology in early December, no changes were made, last echo was 1.5 years ago per patient. Pt recently had a DEXA scan performed showing change in lower vertebra, she is concerned as she has had lower back pain for several months but it has not inhibited her ability to perform ADLs.     Denies changes in weight, appetite, lethargy or change in activity level, visual changes, room spinning, sweating, fevers, HA, chest pain or tightness, dyspnea, abdominal pain, vomiting, diarrhea, swelling of legs, recent travel or major life events.     In ED, VS T 97; -115 /83 RR 18  SPO2 98on RA   EKG: Accelerated Junctional Rhythm vs 1st  degree AV block,, rate 101  Labs: CBC wnl, coag wnl, BMP : AST elev 40, Trop neg,  CT head: no acute hemorrhage or infarct  CXR: hyperinflated lungs    Received 1 L NS bolus in ED.     Admitted for presyncope. Seen by cardiology, Dr. Longo, continued on aspirin and Lopressor. Echo showed Valvular heart disease with mild AS & moderate AI  Normal functioning MVR, Severe TR with mild pulmonary hypertension, Biatrial dilatation, Normal systolic function of the left ventricle.     Found to be in Aflutter on tele monitor. CHADSVACS 2. Started Coumadin and bridged with Lovenox. Changed metoprolol 25 QID and Digoxin 0.25 for 4 doses for rate control and rhythm control. Digoxin discontinued prior to discharge.    Patient stable for discharge home. 79 yo F PMH Paroxysmal Afib (not on AC), HTN, Aortic Stenosis, s/p MV replacement x2, Osteopenia presented with 3 days of heart pounding and presyncopal episode today. Heart pounding and racing sensation started 6 weeks ago, intermittently after her MOH's surgery on right neck. The day after her MOH's surgery, she felt ill walking around her house prior to eating breakfast and fell from the couch onto the carpeted floor. Immediately awoke, no slurring,  Denied LOC, shaking, urination. She stated she was not injured and did not seek medical care. Heart racing and pounding continued until the day of admission, when she was walking around her house, prior to eating breakfast when she began to feel hot, nauseated, lightheaded and ill similar to the fall episode 6 weeks ago, so she laid down on the couch but denied LOC, shaking, urination. The feeling passed and her daughter decided to take her to the hospital. Sees her PCP q3 months for BP checks. No change in BP medication. Last saw Cardiology in early December, no changes were made, last echo was 1.5 years ago per patient. Pt recently had a DEXA scan performed showing change in lower vertebra, she is concerned as she has had lower back pain for several months but it has not inhibited her ability to perform ADLs.     Denies changes in weight, appetite, lethargy or change in activity level, visual changes, room spinning, sweating, fevers, HA, chest pain or tightness, dyspnea, abdominal pain, vomiting, diarrhea, swelling of legs, recent travel or major life events.     In ED, VS T 97; -115 /83 RR 18  SPO2 98on RA   EKG: Accelerated Junctional Rhythm vs 1st  degree AV block,, rate 101  Labs: CBC wnl, coag wnl, BMP : AST elev 40, Trop neg,  CT head: no acute hemorrhage or infarct  CXR: hyperinflated lungs    Received 1 L NS bolus in ED.     Admitted for presyncope. Seen by cardiology, Dr. Longo, continued on aspirin and Lopressor. Echo showed Valvular heart disease with mild AS & moderate AI  Normal functioning MVR, Severe TR with mild pulmonary hypertension, Biatrial dilatation, Normal systolic function of the left ventricle.     Found to be in Aflutter on tele monitor. CHADSVACS 2. Started Coumadin and bridged with Lovenox. Changed metoprolol 25 QID and Digoxin 0.25 for 4 doses for rate control and rhythm control. Discharged home on metoprolol, digoxin and coumadin with instructions to follow up with cardiology within 2 days of discharge to discuss management of arrhythmmia and for check of INR.    Patient stable for discharge home. 77 yo F PMH Paroxysmal Afib (not on AC), HTN, Aortic Stenosis, s/p MV replacement x2, Osteopenia presented with 3 days of heart pounding and presyncopal episode today. Heart pounding and racing sensation started 6 weeks ago, intermittently after her MOH's surgery on right neck. The day after her MOH's surgery, she felt ill walking around her house prior to eating breakfast and fell from the couch onto the carpeted floor. Immediately awoke, no slurring,  Denied LOC, shaking, urination. She stated she was not injured and did not seek medical care. Heart racing and pounding continued until the day of admission, when she was walking around her house, prior to eating breakfast when she began to feel hot, nauseated, lightheaded and ill similar to the fall episode 6 weeks ago, so she laid down on the couch but denied LOC, shaking, urination. The feeling passed and her daughter decided to take her to the hospital. Sees her PCP q3 months for BP checks. No change in BP medication. Last saw Cardiology in early December, no changes were made, last echo was 1.5 years ago per patient. Pt recently had a DEXA scan performed showing change in lower vertebra, she is concerned as she has had lower back pain for several months but it has not inhibited her ability to perform ADLs.     Denies changes in weight, appetite, lethargy or change in activity level, visual changes, room spinning, sweating, fevers, HA, chest pain or tightness, dyspnea, abdominal pain, vomiting, diarrhea, swelling of legs, recent travel or major life events.     In ED, VS T 97; -115 /83 RR 18  SPO2 98on RA   EKG: Accelerated Junctional Rhythm vs 1st  degree AV block,, rate 101  Labs: CBC wnl, coag wnl, BMP : AST elev 40, Trop neg,  CT head: no acute hemorrhage or infarct  CXR: hyperinflated lungs    Received 1 L NS bolus in ED.     Admitted for presyncope. Seen by cardiology, Dr. Longo, continued on aspirin and Lopressor. Echo showed Valvular heart disease with mild AS & moderate AI  Normal functioning MVR, Severe TR with mild pulmonary hypertension, Biatrial dilatation, Normal systolic function of the left ventricle.     Found to be in Aflutter on tele monitor. CHADSVACS 2. Started Coumadin and bridged with Lovenox. Changed metoprolol 25 QID and Digoxin 0.25 for 4 doses for rate control and rhythm control. Discharged home on metoprolol, digoxin and coumadin with instructions to follow up with cardiology within 2 days of discharge to discuss management of arrhythmmia and for check of INR.    Patient stable for discharge home.    T(C): 36.6 (01-30-19 @ 08:00), Max: 37 (01-29-19 @ 20:05)  HR: 60 (01-30-19 @ 08:00) (52 - 105)  BP: 102/51 (01-30-19 @ 08:00) (91/59 - 112/75)  RR: 17 (01-30-19 @ 08:00) (16 - 18)  SpO2: 98% (01-30-19 @ 08:00) (97% - 99%)    GENERAL: frail, elderly  female in no acute distress, appropriate, pleasant  EYES: sclera clear, no exudates  ENMT: oropharynx clear without erythema, no exudates, moist mucous membranes, no blood in nares  NECK: supple, soft  LUNGS: clear to auscultation, no wheezing or rhonchi appreciated  HEART: S1/S2, regular rate and rhythm, no murmurs noted, no lower extremity edema  GASTROINTESTINAL: abdomen is soft, nontender, nondistended, normoactive bowel sounds, no palpable masses  INTEGUMENT: good skin turgor  MUSCULOSKELETAL: no clubbing or cyanosis, no obvious deformity  NEUROLOGIC: awake, alert, oriented x3, good muscle tone in 4 extremities, no obvious sensory deficits  PSYCHIATRIC: mood is good, affect is congruent, linear and logical thought process  HEME/LYMPH: no obvious ecchymosis or petechiae 79 yo F PMH Paroxysmal Afib (not on AC), HTN, Aortic Stenosis, s/p MV replacement x2, Osteopenia presented with 3 days of heart pounding and presyncopal episode today. Heart pounding and racing sensation started 6 weeks ago, intermittently after her MOH's surgery on right neck. The day after her MOH's surgery, she felt ill walking around her house prior to eating breakfast and fell from the couch onto the carpeted floor. Immediately awoke, no slurring,  Denied LOC, shaking, urination. She stated she was not injured and did not seek medical care. Heart racing and pounding continued until the day of admission, when she was walking around her house, prior to eating breakfast when she began to feel hot, nauseated, lightheaded and ill similar to the fall episode 6 weeks ago, so she laid down on the couch but denied LOC, shaking, urination. The feeling passed and her daughter decided to take her to the hospital. Sees her PCP q3 months for BP checks. No change in BP medication. Last saw Cardiology in early December, no changes were made, last echo was 1.5 years ago per patient. Pt recently had a DEXA scan performed showing change in lower vertebra, she is concerned as she has had lower back pain for several months but it has not inhibited her ability to perform ADLs.     Denies changes in weight, appetite, lethargy or change in activity level, visual changes, room spinning, sweating, fevers, HA, chest pain or tightness, dyspnea, abdominal pain, vomiting, diarrhea, swelling of legs, recent travel or major life events.     In ED, VS T 97; -115 /83 RR 18  SPO2 98on RA   EKG: Accelerated Junctional Rhythm vs 1st  degree AV block,, rate 101  Labs: CBC wnl, coag wnl, BMP : AST elev 40, Trop neg,  CT head: no acute hemorrhage or infarct  CXR: hyperinflated lungs    Received 1 L NS bolus in ED.     Admitted for presyncope. Seen by cardiology, Dr. Longo, continued on aspirin and Lopressor. Echo showed Valvular heart disease with mild AS & moderate AI  Normal functioning MVR, Severe TR with mild pulmonary hypertension, Biatrial dilatation, Normal systolic function of the left ventricle.     Found to be in Aflutter on tele monitor. CHADSVACS 2. Started Coumadin and bridged with Lovenox. Changed metoprolol 25 QID and Digoxin 0.25 for 4 doses for rate control and rhythm control. Discharged home on metoprolol, digoxin and coumadin with instructions to follow up with cardiology within 2 days of discharge to discuss management of arrhythmmia and for check of INR.    Patient stable for discharge home.    T(C): 36.6 (01-30-19 @ 08:00), Max: 37 (01-29-19 @ 20:05)  HR: 60 (01-30-19 @ 08:00) (52 - 105)  BP: 102/51 (01-30-19 @ 08:00) (91/59 - 112/75)  RR: 17 (01-30-19 @ 08:00) (16 - 18)  SpO2: 98% (01-30-19 @ 08:00) (97% - 99%)    GENERAL: frail, elderly  female in no acute distress, appropriate, pleasant  EYES: sclera clear, no exudates  ENMT: oropharynx clear without erythema, no exudates, moist mucous membranes, no blood in nares  NECK: supple, soft  LUNGS: clear to auscultation, no wheezing or rhonchi appreciated  HEART: S1/S2, regular rate and rhythm, no murmurs noted, no lower extremity edema  GASTROINTESTINAL: abdomen is soft, nontender, nondistended, normoactive bowel sounds, no palpable masses  INTEGUMENT: good skin turgor  MUSCULOSKELETAL: no clubbing or cyanosis, no obvious deformity  NEUROLOGIC: awake, alert, oriented x3, good muscle tone in 4 extremities, no obvious sensory deficits  PSYCHIATRIC: mood is good, affect is congruent, linear and logical thought process  HEME/LYMPH: no obvious ecchymosis or petechiae     Patient cleared by cardio for discharge and does not need to stay for Lovenox bridging .

## 2019-01-25 NOTE — DISCHARGE NOTE ADULT - PLAN OF CARE
You had palpitations and sense of fainting likely due to your atrial fibrillation with elevated heart rates above 100 bpm.  Your heart rate was controlled with metoprolol and Digoxin You had palpitations and sense of fainting likely due to your atrial fibrillation with elevated heart rates above 100 bpm.  Your heart rate was controlled with metoprolol and Digoxin. Please continue to take metoprolol as directed. Please follow up with your cardiologist, Dr. Guerrero, within 1 week of discharge from hospital. Follow up with your PCP within 2 days of discharge. continue metoprolol as directed. Follow up with your PCP Please continue metoprolol and coumadin. Follow up with your PCP for a check of your INR within 2 days of discharge. Continue calcium. You had palpitations and sense of fainting likely due to your atrial fibrillation with elevated heart rates above 100 bpm.  Your heart rate was controlled with metoprolol and Digoxin. Please continue to take metoprolol and digoxin  as directed until you speak with your cardiologist. Please follow up with your cardiologist, Dr. Guerrero, on 2/1/19. Follow up with your PCP within 2 days of discharge. Please continue metoprolo, digoxin l and coumadin. Follow up with your cardiologist for a check of your INR within 2 days of discharge. Continue calcium supplement. Prevent recurrence You had palpitations and sense of fainting likely due to your atrial flutter with elevated heart rates above 100 bpm.  Your heart rate was controlled with metoprolol and Digoxin. Please continue to take metoprolol and digoxin  as directed until you speak with your cardiologist. Please follow up with your cardiologist, Dr. Guerrero, on 2/1/19. Follow up with your PCP within 2 days of discharge. continue metoprolol 25 mg twice daily as directed. Follow up with your PCP

## 2019-01-25 NOTE — DISCHARGE NOTE ADULT - MEDICATION SUMMARY - MEDICATIONS TO TAKE
I will START or STAY ON the medications listed below when I get home from the hospital:    Ecotrin Adult Low Strength 81 mg oral delayed release tablet  -- 1 tab(s) by mouth once a day  -- Indication: For Need for prophylactic measure    digoxin 125 mcg (0.125 mg) oral tablet  -- 1 tab(s) by mouth once a day for 7 days  -- Indication: For Atrial flutter    Coumadin 7.5 mg oral tablet  -- 1 tab(s) by mouth once a day for 2 days, follow up with cardiologist for INR check on 2/1/19  -- Do not take this drug if you are pregnant.  It is very important that you take or use this exactly as directed.  Do not skip doses or discontinue unless directed by your doctor.  Obtain medical advice before taking any non-prescription drugs as some may affect the action of this medication.    -- Indication: For Atrial flutter    Metoprolol Tartrate 25 mg oral tablet  -- 1 tab(s) by mouth 2 times a day for 7 days  -- Indication: For Atrial flutter

## 2019-01-25 NOTE — DISCHARGE NOTE ADULT - CARE PLAN
Principal Discharge DX:	Pre-syncope  Assessment and plan of treatment:	You had palpitations and sense of fainting likely due to your atrial fibrillation with elevated heart rates above 100 bpm.  Your heart rate was controlled with metoprolol and Digoxin  Secondary Diagnosis:	HTN (hypertension), benign Principal Discharge DX:	Pre-syncope  Assessment and plan of treatment:	You had palpitations and sense of fainting likely due to your atrial fibrillation with elevated heart rates above 100 bpm.  Your heart rate was controlled with metoprolol and Digoxin. Please continue to take metoprolol as directed. Please follow up with your cardiologist, Dr. Guerrero, within 1 week of discharge from hospital. Follow up with your PCP within 2 days of discharge.  Secondary Diagnosis:	HTN (hypertension), benign  Assessment and plan of treatment:	continue metoprolol as directed. Follow up with your PCP  Secondary Diagnosis:	Atrial flutter  Assessment and plan of treatment:	Please continue metoprolol and coumadin. Follow up with your PCP for a check of your INR within 2 days of discharge.  Secondary Diagnosis:	Osteopenia  Assessment and plan of treatment:	Continue calcium. Principal Discharge DX:	Pre-syncope  Assessment and plan of treatment:	You had palpitations and sense of fainting likely due to your atrial fibrillation with elevated heart rates above 100 bpm.  Your heart rate was controlled with metoprolol and Digoxin. Please continue to take metoprolol and digoxin  as directed until you speak with your cardiologist. Please follow up with your cardiologist, Dr. Guerrero, on 2/1/19. Follow up with your PCP within 2 days of discharge.  Secondary Diagnosis:	HTN (hypertension), benign  Assessment and plan of treatment:	continue metoprolol as directed. Follow up with your PCP  Secondary Diagnosis:	Atrial flutter  Assessment and plan of treatment:	Please continue metoprolo, digoxin l and coumadin. Follow up with your cardiologist for a check of your INR within 2 days of discharge.  Secondary Diagnosis:	Osteopenia  Assessment and plan of treatment:	Continue calcium supplement. Principal Discharge DX:	Pre-syncope  Goal:	Prevent recurrence  Assessment and plan of treatment:	You had palpitations and sense of fainting likely due to your atrial flutter with elevated heart rates above 100 bpm.  Your heart rate was controlled with metoprolol and Digoxin. Please continue to take metoprolol and digoxin  as directed until you speak with your cardiologist. Please follow up with your cardiologist, Dr. Guerrero, on 2/1/19. Follow up with your PCP within 2 days of discharge.  Secondary Diagnosis:	HTN (hypertension), benign  Assessment and plan of treatment:	continue metoprolol as directed. Follow up with your PCP  Secondary Diagnosis:	Atrial flutter  Assessment and plan of treatment:	Please continue metoprolo, digoxin l and coumadin. Follow up with your cardiologist for a check of your INR within 2 days of discharge.  Secondary Diagnosis:	Osteopenia  Assessment and plan of treatment:	Continue calcium supplement. Principal Discharge DX:	Pre-syncope  Goal:	Prevent recurrence  Assessment and plan of treatment:	You had palpitations and sense of fainting likely due to your atrial flutter with elevated heart rates above 100 bpm.  Your heart rate was controlled with metoprolol and Digoxin. Please continue to take metoprolol and digoxin  as directed until you speak with your cardiologist. Please follow up with your cardiologist, Dr. Guerrero, on 2/1/19. Follow up with your PCP within 2 days of discharge.  Secondary Diagnosis:	HTN (hypertension), benign  Assessment and plan of treatment:	continue metoprolol 25 mg twice daily as directed. Follow up with your PCP  Secondary Diagnosis:	Atrial flutter  Assessment and plan of treatment:	Please continue metoprolo, digoxin l and coumadin. Follow up with your cardiologist for a check of your INR within 2 days of discharge.  Secondary Diagnosis:	Osteopenia  Assessment and plan of treatment:	Continue calcium supplement.

## 2019-01-25 NOTE — DISCHARGE NOTE ADULT - ADDITIONAL INSTRUCTIONS
Follow up with PCP and with cardiology. Check INR within 2 days of discharge from hospital. Please go for INR check on Friday 02/01/19, to your cardiologist's office and get new prescription for coumadin dose based upon INR, this is the recommendation from our cardio Dr. Son here in the hospital ( [facility])   Follow up with PCP and with cardiology.

## 2019-01-25 NOTE — PROGRESS NOTE ADULT - SUBJECTIVE AND OBJECTIVE BOX
CHIEF COMPLAINT: Patient is a 78y old  Female who presents with a chief complaint of Presyncope, Tachycardia (24 Jan 2019 12:10)      HPI:  79 yo F PMH Paroxysmal Afib (not on AC), HTN, Aortic Stenosis, s/p MV replacement x2, Osteopenia presents with 3 days of heart pounding and presyncopal episode today. Pt states heart pounding and racing sensation started 6 weeks ago, intermittently after her MOH's surgery on right neck. The day after her MOH's surgery, she felt ill walking around her house prior to eating breakfast and fell from the couch onto the carpeted floor. Immediately awoke, no slurring,  Denies LOC, shaking, urination. She states she was not injured and did not seek medical care. For the last 3 days the heart racing and pounding has been daily and today she was walking around her house, prior to eating breakfast when she began to feel hot, nauseated, lightheaded and ill similar to the fall episode 6 weeks ago, so she laid down on the couch but denies LOC, shaking, urination. The feeling passed and her daughter decided to take her to the hospital. Sees her PCP q3 months for BP checks. No change in BP medication. Last saw Cardiology in early December, no changes were made, last echo was 1.5 years ago per patient. Pt recently had a DEXA scan performed showing change in lower vertebra, she is concerned as she has had lower back pain for several months but it has not inhibited her ability to perform ADLs. No other focal complaints at this time.   Denies changes in weight, appetite, lethargy or change in activity level, visual changes, room spinning, sweating, fevers, HA, chest pain or tightness, dyspnea, abdominal pain, vomiting, diarrhea, swelling of legs, recent travel or major life events.         Subjective:  Lying on stretcher in ED. No complaints      MEDICATIONS  (STANDING):  aspirin enteric coated 81 milliGRAM(s) Oral daily  enoxaparin Injectable 40 milliGRAM(s) SubCutaneous every 24 hours  metoprolol tartrate 25 milliGRAM(s) Oral two times a day    MEDICATIONS  (PRN):  acetaminophen   Tablet .. 650 milliGRAM(s) Oral every 6 hours PRN Temp greater or equal to 38C (100.4F), Mild Pain (1 - 3)      Allergies    adhesives (Urticaria)  No Known Drug Allergies    Intolerances    [facility] PT (Unknown)        VITAL SIGNS:   Vital Signs Last 24 Hrs  T(C): 36.7 (25 Jan 2019 08:05), Max: 37.3 (24 Jan 2019 19:25)  T(F): 98.1 (25 Jan 2019 08:05), Max: 99.1 (24 Jan 2019 19:25)  HR: 104 (25 Jan 2019 08:05) (93 - 112)  BP: 124/68 (25 Jan 2019 08:05) (99/53 - 124/68)  BP(mean): --  RR: 15 (25 Jan 2019 08:05) (15 - 20)  SpO2: 98% (25 Jan 2019 08:05) (97% - 100%)    I&O's Summary      PHYSICAL EXAM:    Constitutional: NAD, awake and alert, well-developed  Eyes:  EOMI,  Pupils round, No oral cyanosis.  Pulmonary: Non-labored, breath sounds are clear bilaterally, No wheezing, rales or rhonchi  Cardiovascular: S1 and S2, regular rate and rhythm,   Gastrointestinal: Bowel Sounds present, soft, nontender.   Lymph: No peripheral edema.   Neurological: Alert, no focal deficits  Skin: No rashes.  Psych:  Mood & affect appropriate    LABS: All Labs Reviewed:                        13.6   7.98  )-----------( 299      ( 25 Jan 2019 08:00 )             42.6                         14.1   7.34  )-----------( 312      ( 24 Jan 2019 10:04 )             43.8     25 Jan 2019 08:00    142    |  107    |  18     ----------------------------<  93     3.7     |  29     |  0.74   24 Jan 2019 10:04    139    |  103    |  20     ----------------------------<  100    4.7     |  31     |  1.10     Ca    9.0        25 Jan 2019 08:00  Ca    9.5        24 Jan 2019 10:04  Mg     2.0       25 Jan 2019 08:00    TPro  8.1    /  Alb  3.7    /  TBili  0.7    /  DBili  x      /  AST  40     /  ALT  36     /  AlkPhos  99     24 Jan 2019 10:04    PT/INR - ( 25 Jan 2019 08:00 )   PT: 12.1 sec;   INR: 1.07 ratio         PTT - ( 24 Jan 2019 10:04 )  PTT:31.7 sec  CARDIAC MARKERS ( 24 Jan 2019 10:04 )  <.015 ng/mL / x     / 66 U/L / x     / 1.7 ng/mL

## 2019-01-25 NOTE — PROGRESS NOTE ADULT - PROBLEM SELECTOR PLAN 1
- Likely 2/2 arrythmia vs structural heart disease vs orthostatic hypotension  - Last ANITA 2017 showed hyperdynamic LV motion and EF > 55%  - Orthostatics negative.   - TSH wnl   - f/u echocardiogram  - Cardio Consult, Dr. Harvey, appreciate recs - Likely 2/2 arrythmia vs structural heart disease vs orthostatic hypotension  - Last ANITA 2017 showed hyperdynamic LV motion and EF > 55%  - Orthostatics negative.   - TSH wnl  - Telemetry showed -140's when walking on the floor and pt states she felt heart pounding when she was walking, Increased Metoprolol 50 BID per Cardio  - f/u echocardiogram  - Cardio Consult, Dr. Harvey, appreciate recs - Likely 2/2 arrythmia vs structural heart disease vs orthostatic hypotension  - Last ANITA 2017 showed hyperdynamic LV motion and EF > 55%  - Orthostatics negative.   - TSH wnl  - Telemetry showed -140's when walking on the floor and pt states she felt heart pounding when she was walking, Increased Metoprolol 50 BID per Cardio  - f/u echocardiogram  - Cardio Consult, Dr. Longo, appreciate recs

## 2019-01-25 NOTE — PROGRESS NOTE ADULT - PROBLEM SELECTOR PLAN 2
- Currently in sinus tachycardia, s/p 1 L NS in ED, EKG shows 1st degree AV block  - Continue home metoprolol 25 mg BID - Per tele rates range from 107-140, regular rate with 1st degree AV block  - Increase metoprolol 50 mg BID for rate control of Afib

## 2019-01-25 NOTE — DISCHARGE NOTE ADULT - CARE PROVIDER_API CALL
ronnie,   cardiologist  Phone: (   )    -  Fax: (   )    -    Sherri Vasquez), Internal Medicine  [address] [place] [id number]  Phone: [phone]  Fax: [phone]

## 2019-01-26 LAB
ANION GAP SERPL CALC-SCNC: 7 MMOL/L — SIGNIFICANT CHANGE UP (ref 5–17)
APTT BLD: 30.5 SEC — SIGNIFICANT CHANGE UP (ref 28.5–37)
BUN SERPL-MCNC: 16 MG/DL — SIGNIFICANT CHANGE UP (ref 7–23)
CALCIUM SERPL-MCNC: 8.9 MG/DL — SIGNIFICANT CHANGE UP (ref 8.5–10.1)
CHLORIDE SERPL-SCNC: 107 MMOL/L — SIGNIFICANT CHANGE UP (ref 96–108)
CO2 SERPL-SCNC: 28 MMOL/L — SIGNIFICANT CHANGE UP (ref 22–31)
CREAT SERPL-MCNC: 0.73 MG/DL — SIGNIFICANT CHANGE UP (ref 0.5–1.3)
GLUCOSE SERPL-MCNC: 85 MG/DL — SIGNIFICANT CHANGE UP (ref 70–99)
HCT VFR BLD CALC: 42.1 % — SIGNIFICANT CHANGE UP (ref 34.5–45)
HGB BLD-MCNC: 13.8 G/DL — SIGNIFICANT CHANGE UP (ref 11.5–15.5)
INR BLD: 1.12 RATIO — SIGNIFICANT CHANGE UP (ref 0.88–1.16)
MCHC RBC-ENTMCNC: 28.2 PG — SIGNIFICANT CHANGE UP (ref 27–34)
MCHC RBC-ENTMCNC: 32.8 GM/DL — SIGNIFICANT CHANGE UP (ref 32–36)
MCV RBC AUTO: 86.1 FL — SIGNIFICANT CHANGE UP (ref 80–100)
NRBC # BLD: 0 /100 WBCS — SIGNIFICANT CHANGE UP (ref 0–0)
PLATELET # BLD AUTO: 289 K/UL — SIGNIFICANT CHANGE UP (ref 150–400)
POTASSIUM SERPL-MCNC: 3.9 MMOL/L — SIGNIFICANT CHANGE UP (ref 3.5–5.3)
POTASSIUM SERPL-SCNC: 3.9 MMOL/L — SIGNIFICANT CHANGE UP (ref 3.5–5.3)
PROTHROM AB SERPL-ACNC: 12.8 SEC — SIGNIFICANT CHANGE UP (ref 10–12.9)
RBC # BLD: 4.89 M/UL — SIGNIFICANT CHANGE UP (ref 3.8–5.2)
RBC # FLD: 14.4 % — SIGNIFICANT CHANGE UP (ref 10.3–14.5)
SODIUM SERPL-SCNC: 142 MMOL/L — SIGNIFICANT CHANGE UP (ref 135–145)
WBC # BLD: 7.77 K/UL — SIGNIFICANT CHANGE UP (ref 3.8–10.5)
WBC # FLD AUTO: 7.77 K/UL — SIGNIFICANT CHANGE UP (ref 3.8–10.5)

## 2019-01-26 PROCEDURE — 99233 SBSQ HOSP IP/OBS HIGH 50: CPT

## 2019-01-26 PROCEDURE — 93010 ELECTROCARDIOGRAM REPORT: CPT

## 2019-01-26 RX ORDER — METOPROLOL TARTRATE 50 MG
50 TABLET ORAL
Qty: 0 | Refills: 0 | Status: DISCONTINUED | OUTPATIENT
Start: 2019-01-26 | End: 2019-01-26

## 2019-01-26 RX ORDER — WARFARIN SODIUM 2.5 MG/1
2.5 TABLET ORAL ONCE
Qty: 0 | Refills: 0 | Status: COMPLETED | OUTPATIENT
Start: 2019-01-26 | End: 2019-01-26

## 2019-01-26 RX ORDER — METOPROLOL TARTRATE 50 MG
50 TABLET ORAL
Qty: 0 | Refills: 0 | Status: DISCONTINUED | OUTPATIENT
Start: 2019-01-26 | End: 2019-01-28

## 2019-01-26 RX ORDER — ENOXAPARIN SODIUM 100 MG/ML
50 INJECTION SUBCUTANEOUS EVERY 12 HOURS
Qty: 0 | Refills: 0 | Status: DISCONTINUED | OUTPATIENT
Start: 2019-01-26 | End: 2019-01-30

## 2019-01-26 RX ADMIN — ENOXAPARIN SODIUM 50 MILLIGRAM(S): 100 INJECTION SUBCUTANEOUS at 18:05

## 2019-01-26 RX ADMIN — Medication 50 MILLIGRAM(S): at 05:50

## 2019-01-26 RX ADMIN — Medication 81 MILLIGRAM(S): at 12:04

## 2019-01-26 RX ADMIN — WARFARIN SODIUM 2.5 MILLIGRAM(S): 2.5 TABLET ORAL at 21:46

## 2019-01-26 RX ADMIN — Medication 50 MILLIGRAM(S): at 18:05

## 2019-01-26 NOTE — PROGRESS NOTE ADULT - PROBLEM SELECTOR PLAN 1
- Likely secondary to Aflutter, per cardio  - Last ANITA 2017 showed hyperdynamic LV motion and EF > 55%  - TSH wnl  - Continue metoprolol as ordered  - D/W Cardio Dr. Dumont. Will start Coumadin and bridge with Lovenox. D/w patient, risks and benefits discussed at length. Patient agreeable to start coumadin

## 2019-01-26 NOTE — PROGRESS NOTE ADULT - SUBJECTIVE AND OBJECTIVE BOX
CHIEF COMPLAINT: Patient is a 78y old  Female who presents with a chief complaint of Presyncope, Tachycardia (25 Jan 2019 14:40)      HPI:  79 yo F PMH Paroxysmal Afib (not on AC), HTN, Aortic Stenosis, s/p MV replacement x2, Osteopenia presents with 3 days of heart pounding and presyncopal episode today. Pt states heart pounding and racing sensation started 6 weeks ago, intermittently after her MOH's surgery on right neck. The day after her MOH's surgery, she felt ill walking around her house prior to eating breakfast and fell from the couch onto the carpeted floor. Immediately awoke, no slurring,  Denies LOC, shaking, urination. She states she was not injured and did not seek medical care. For the last 3 days the heart racing and pounding has been daily and today she was walking around her house, prior to eating breakfast when she began to feel hot, nauseated, lightheaded and ill similar to the fall episode 6 weeks ago, so she laid down on the couch but denies LOC, shaking, urination. The feeling passed and her daughter decided to take her to the hospital. Sees her PCP q3 months for BP checks. No change in BP medication. Last saw Cardiology in early December, no changes were made, last echo was 1.5 years ago per patient. Pt recently had a DEXA scan performed showing change in lower vertebra, she is concerned as she has had lower back pain for several months but it has not inhibited her ability to perform ADLs. No other focal complaints at this time.         In ED, VS T 97; -115 /83 RR 18  SPO2 98on RA   EKG: Accelerated Junctional Rhythm vs 1st  degree AV block,, rate 101  Labs: CBC wnl, coag wnl, BMP : AST elev 40, Trop neg,  CT head: no acute hemorrhage or infarct  CXR: hyperinflated lungs    Received 1 L NS bolus in ED (24 Jan 2019 12:10)      MEDICATIONS  (STANDING):  aspirin enteric coated 81 milliGRAM(s) Oral daily  enoxaparin Injectable 40 milliGRAM(s) SubCutaneous every 24 hours  metoprolol tartrate 50 milliGRAM(s) Oral two times a day    MEDICATIONS  (PRN):  acetaminophen   Tablet .. 650 milliGRAM(s) Oral every 6 hours PRN Temp greater or equal to 38C (100.4F), Mild Pain (1 - 3)      Allergies    adhesives (Urticaria)  No Known Drug Allergies    Intolerances    [facility] PT (Unknown)      REVIEW OF SYSTEMS:    CONSTITUTIONAL: No weakness, fevers or chills  EYES/ENT: No visual changes;  No vertigo or throat pain   NECK: No pain or stiffness  RESPIRATORY: No cough, wheezing, hemoptysis; No shortness of breath  CARDIOVASCULAR: + palpitations & lightheadedness  GASTROINTESTINAL: No abdominal pain. No nausea, vomiting, or hematemesis; No diarrhea or constipation. No melena or hematochezia.  GENITOURINARY: No dysuria, frequency or hematuria  NEUROLOGICAL: No numbness or weakness  SKIN: No itching or rash  All other review of systems is negative unless indicated above    VITAL SIGNS:   Vital Signs Last 24 Hrs  T(C): 36.5 (26 Jan 2019 07:10), Max: 37.1 (25 Jan 2019 10:23)  T(F): 97.7 (26 Jan 2019 07:10), Max: 98.8 (25 Jan 2019 10:23)  HR: 106 (26 Jan 2019 07:10) (104 - 108)  BP: 103/70 (26 Jan 2019 07:10) (97/65 - 111/73)  BP(mean): --  RR: 17 (26 Jan 2019 07:10) (16 - 18)  SpO2: 99% (26 Jan 2019 07:10) (94% - 100%)    I&O's Summary    25 Jan 2019 07:01  -  26 Jan 2019 07:00  --------------------------------------------------------  IN: 100 mL / OUT: 0 mL / NET: 100 mL        PHYSICAL EXAM:    Constitutional: NAD, awake and alert, well-developed  Eyes:  EOMI,  Pupils round, No oral cyanosis.  Pulmonary: Non-labored, breath sounds are clear bilaterally, No wheezing, rales or rhonchi  Cardiovascular: S1 and S2, regular rate and rhythm, ZANA LSB  Gastrointestinal: Bowel Sounds present, soft, nontender.   Lymph: No peripheral edema. No cervical lymphadenopathy.  Neurological: Alert, no focal deficits      LABS: All Labs Reviewed:                        13.8   7.77  )-----------( 289      ( 26 Jan 2019 07:11 )             42.1                         13.6   7.98  )-----------( 299      ( 25 Jan 2019 08:00 )             42.6                         14.1   7.34  )-----------( 312      ( 24 Jan 2019 10:04 )             43.8     26 Jan 2019 07:11    142    |  107    |  16     ----------------------------<  85     3.9     |  28     |  0.73   25 Jan 2019 08:00    142    |  107    |  18     ----------------------------<  93     3.7     |  29     |  0.74   24 Jan 2019 10:04    139    |  103    |  20     ----------------------------<  100    4.7     |  31     |  1.10     Ca    8.9        26 Jan 2019 07:11  Ca    9.0        25 Jan 2019 08:00  Ca    9.5        24 Jan 2019 10:04  Mg     2.0       25 Jan 2019 08:00    TPro  8.1    /  Alb  3.7    /  TBili  0.7    /  DBili  x      /  AST  40     /  ALT  36     /  AlkPhos  99     24 Jan 2019 10:04    PT/INR - ( 25 Jan 2019 08:00 )   PT: 12.1 sec;   INR: 1.07 ratio         PTT - ( 24 Jan 2019 10:04 )  PTT:31.7 sec  CARDIAC MARKERS ( 24 Jan 2019 10:04 )  <.015 ng/mL / x     / 66 U/L / x     / 1.7 ng/mL           01-25 @ 08:00  TSH: 2.41      Telemetry: Probable a flutter      Imaging: Echo Bi-atrial enlargement, AS/AI, normal MVR, normal LVEF, TR & pulm HTN

## 2019-01-26 NOTE — PROGRESS NOTE ADULT - SUBJECTIVE AND OBJECTIVE BOX
Patient is a 78y old  Female who presents with a chief complaint of Presyncope, Tachycardia (26 Jan 2019 08:47)       INTERVAL HPI/OVERNIGHT EVENTS:    MEDICATIONS  (STANDING):  aspirin enteric coated 81 milliGRAM(s) Oral daily  enoxaparin Injectable 50 milliGRAM(s) SubCutaneous every 12 hours  metoprolol tartrate 50 milliGRAM(s) Oral two times a day  warfarin 2.5 milliGRAM(s) Oral once    MEDICATIONS  (PRN):  acetaminophen   Tablet .. 650 milliGRAM(s) Oral every 6 hours PRN Temp greater or equal to 38C (100.4F), Mild Pain (1 - 3)      Allergies    adhesives (Urticaria)  No Known Drug Allergies    Intolerances    [facility] PT (Unknown)      REVIEW OF SYSTEMS:  CONSTITUTIONAL: No fever, weight loss, or fatigue  EYES: No eye pain, visual disturbances, or discharge  ENMT:  No difficulty hearing, tinnitus, vertigo; No sinus or throat pain  NECK: No pain or stiffness  BREASTS: No pain, masses, or nipple discharge  RESPIRATORY: No cough, wheezing, chills or hemoptysis; No shortness of breath  CARDIOVASCULAR: No chest pain, palpitations, dizziness, or leg swelling  GASTROINTESTINAL: No abdominal or epigastric pain. No nausea, vomiting, or hematemesis; No diarrhea or constipation. No melena or hematochezia.  GENITOURINARY: No dysuria, frequency, hematuria, or incontinence  NEUROLOGICAL: No headaches, memory loss, loss of strength, numbness, or tremors  SKIN: No itching, burning, rashes, or lesions   LYMPH NODES: No enlarged glands  ENDOCRINE: No heat or cold intolerance; No hair loss; No polydipsia or polyuria  MUSCULOSKELETAL: No joint pain or swelling; No muscle, back, or extremity pain  PSYCHIATRIC: No depression, anxiety, mood swings, or difficulty sleeping  HEME/LYMPH: No easy bruising, or bleeding gums  ALLERGY AND IMMUNOLOGIC: No hives or eczema    Vital Signs Last 24 Hrs  T(C): 36.5 (26 Jan 2019 07:10), Max: 37.1 (25 Jan 2019 10:23)  T(F): 97.7 (26 Jan 2019 07:10), Max: 98.8 (25 Jan 2019 10:23)  HR: 106 (26 Jan 2019 07:10) (104 - 108)  BP: 103/70 (26 Jan 2019 07:10) (97/65 - 111/73)  BP(mean): --  RR: 17 (26 Jan 2019 07:10) (16 - 18)  SpO2: 99% (26 Jan 2019 07:10) (94% - 100%)    PHYSICAL EXAM:  GENERAL: NAD, well-groomed, well-developed  HEAD:  Atraumatic, Normocephalic  EYES: EOMI, PERRLA, conjunctiva and sclera clear  ENMT: No tonsillar erythema, exudates, or enlargement; Moist mucous membranes, Good dentition, No lesions  NECK: Supple, No JVD, Normal thyroid  NERVOUS SYSTEM:  Alert & Oriented X3, Good concentration; Motor Strength 5/5 B/L upper and lower extremities; DTRs 2+ intact and symmetric  CHEST/LUNG: Clear to auscultation bilaterally; No rales, rhonchi, wheezing, or rubs  HEART: Regular rate and rhythm; No murmurs, rubs, or gallops  ABDOMEN: Soft, Nontender, Nondistended; Bowel sounds present  EXTREMITIES:  2+ Peripheral Pulses, No clubbing, cyanosis, or edema  LYMPH: No lymphadenopathy noted  SKIN: No rashes or lesions    LABS:                        13.8   7.77  )-----------( 289      ( 26 Jan 2019 07:11 )             42.1     26 Jan 2019 07:11    142    |  107    |  16     ----------------------------<  85     3.9     |  28     |  0.73     Ca    8.9        26 Jan 2019 07:11      PT/INR - ( 25 Jan 2019 08:00 )   PT: 12.1 sec;   INR: 1.07 ratio         PTT - ( 24 Jan 2019 10:04 )  PTT:31.7 sec  CAPILLARY BLOOD GLUCOSE        BLOOD CULTURE    RADIOLOGY & ADDITIONAL TESTS:    Imaging Personally Reviewed:  [ ] YES     Consultant(s) Notes Reviewed:      Care Discussed with Consultants/Other Providers:

## 2019-01-27 LAB
APTT BLD: 31.7 SEC — SIGNIFICANT CHANGE UP (ref 28.5–37)
INR BLD: 1.13 RATIO — SIGNIFICANT CHANGE UP (ref 0.88–1.16)
PROTHROM AB SERPL-ACNC: 12.9 SEC — SIGNIFICANT CHANGE UP (ref 10–12.9)

## 2019-01-27 PROCEDURE — 99233 SBSQ HOSP IP/OBS HIGH 50: CPT

## 2019-01-27 RX ORDER — WARFARIN SODIUM 2.5 MG/1
3 TABLET ORAL ONCE
Qty: 0 | Refills: 0 | Status: COMPLETED | OUTPATIENT
Start: 2019-01-27 | End: 2019-01-27

## 2019-01-27 RX ADMIN — Medication 50 MILLIGRAM(S): at 17:50

## 2019-01-27 RX ADMIN — Medication 81 MILLIGRAM(S): at 12:41

## 2019-01-27 RX ADMIN — ENOXAPARIN SODIUM 50 MILLIGRAM(S): 100 INJECTION SUBCUTANEOUS at 17:50

## 2019-01-27 RX ADMIN — Medication 50 MILLIGRAM(S): at 05:24

## 2019-01-27 RX ADMIN — ENOXAPARIN SODIUM 50 MILLIGRAM(S): 100 INJECTION SUBCUTANEOUS at 05:25

## 2019-01-27 RX ADMIN — WARFARIN SODIUM 3 MILLIGRAM(S): 2.5 TABLET ORAL at 21:39

## 2019-01-27 NOTE — PROGRESS NOTE ADULT - SUBJECTIVE AND OBJECTIVE BOX
Patient is a 78y old  Female who presents with a chief complaint of Presyncope, Tachycardia (27 Jan 2019 07:33)       INTERVAL HPI/OVERNIGHT EVENTS: 79 yo F PMH Paroxysmal Afib (not on AC), HTN, Aortic Stenosis, s/p MV replacement x2,  Osteopenia presents with 3 days of heart pounding and presyncopal episode likely secondary to A Flutter. Seen and examined at bedside. No new symptoms, complaints       MEDICATIONS  (STANDING):  aspirin enteric coated 81 milliGRAM(s) Oral daily  enoxaparin Injectable 50 milliGRAM(s) SubCutaneous every 12 hours  metoprolol tartrate 50 milliGRAM(s) Oral two times a day  warfarin 3 milliGRAM(s) Oral once    MEDICATIONS  (PRN):  acetaminophen   Tablet .. 650 milliGRAM(s) Oral every 6 hours PRN Temp greater or equal to 38C (100.4F), Mild Pain (1 - 3)      Allergies    adhesives (Urticaria)  No Known Drug Allergies    Intolerances    [facility] PT (Unknown)      REVIEW OF SYSTEMS:  All ROS negative except per HPI.   Vital Signs Last 24 Hrs  T(C): 36.9 (27 Jan 2019 08:00), Max: 36.9 (27 Jan 2019 08:00)  T(F): 98.5 (27 Jan 2019 08:00), Max: 98.5 (27 Jan 2019 08:00)  HR: 104 (27 Jan 2019 08:00) (91 - 109)  BP: 96/63 (27 Jan 2019 08:00) (96/63 - 111/72)  BP(mean): --  RR: 16 (27 Jan 2019 08:00) (16 - 17)  SpO2: 99% (27 Jan 2019 08:00) (96% - 99%)    PHYSICAL EXAM:  GENERAL: NAD, Awake, Alert   HEAD:  Atraumatic, Normocephalic  EYES: EOMI, PERRLA, conjunctiva and sclera clear  ENMT: No tonsillar erythema, exudates, or enlargement; Moist mucous membranes  NECK: Supple, No JVD, Normal thyroid  NERVOUS SYSTEM:  Alert & Oriented X3, Good concentration; Motor Strength 5/5 B/L upper and lower extremities  CHEST/LUNG: Clear to auscultation bilaterally; No rales, rhonchi, wheezing, or rubs  HEART: S1S2+, Regular rate and rhythm  ABDOMEN: Soft, Nontender, Nondistended; Bowel sounds present  EXTREMITIES:  2+ Peripheral Pulses, No clubbing, cyanosis, or edema  LYMPH: No lymphadenopathy noted    LABS:      Ca    8.9        26 Jan 2019 07:11      PT/INR - ( 27 Jan 2019 07:25 )   PT: 12.9 sec;   INR: 1.13 ratio         PTT - ( 27 Jan 2019 07:25 )  PTT:31.7 sec  CAPILLARY BLOOD GLUCOSE        BLOOD CULTURE    RADIOLOGY & ADDITIONAL TESTS:    Imaging Personally Reviewed:  [ ] YES     Consultant(s) Notes Reviewed:      Care Discussed with Consultants/Other Providers:

## 2019-01-27 NOTE — PROGRESS NOTE ADULT - SUBJECTIVE AND OBJECTIVE BOX
CHIEF COMPLAINT: Patient is a 78y old  Female who presents with a chief complaint of Presyncope, Tachycardia (26 Jan 2019 09:23)      HPI:  77 yo F PMH Paroxysmal Afib (not on AC), HTN, Aortic Stenosis, s/p MV replacement x2, Osteopenia presents with 3 days of heart pounding and presyncopal episode     MEDICATIONS  (STANDING):  aspirin enteric coated 81 milliGRAM(s) Oral daily  enoxaparin Injectable 50 milliGRAM(s) SubCutaneous every 12 hours  metoprolol tartrate 50 milliGRAM(s) Oral two times a day    MEDICATIONS  (PRN):  acetaminophen   Tablet .. 650 milliGRAM(s) Oral every 6 hours PRN Temp greater or equal to 38C (100.4F), Mild Pain (1 - 3)      Allergies    adhesives (Urticaria)  No Known Drug Allergies    Intolerances    [facility] PT (Unknown)      REVIEW OF SYSTEMS:  CONSTITUTIONAL: No weakness, fevers or chills  EYES/ENT: No visual changes;  No vertigo or throat pain   NECK: No pain or stiffness  RESPIRATORY: No cough, wheezing, hemoptysis; No shortness of breath  CARDIOVASCULAR: + palpitations & lightheadedness  GASTROINTESTINAL: No abdominal pain. No nausea, vomiting, or hematemesis; No diarrhea or constipation. No melena or hematochezia.  GENITOURINARY: No dysuria, frequency or hematuria  NEUROLOGICAL: No numbness or weakness  SKIN: No itching or rash  All other review of systems is negative unless indicated above      VITAL SIGNS:   Vital Signs Last 24 Hrs  T(C): 36.6 (27 Jan 2019 04:21), Max: 36.8 (26 Jan 2019 20:37)  T(F): 97.8 (27 Jan 2019 04:21), Max: 98.2 (26 Jan 2019 20:37)  HR: 106 (27 Jan 2019 04:21) (91 - 109)  BP: 100/67 (27 Jan 2019 04:21) (98/61 - 111/72)  BP(mean): --  RR: 17 (27 Jan 2019 04:21) (16 - 17)  SpO2: 99% (27 Jan 2019 04:21) (96% - 99%)    I&O's Summary      PHYSICAL EXAM:  Constitutional: NAD, awake and alert, well-developed  Eyes:  EOMI,  Pupils round, No oral cyanosis.  Pulmonary: Non-labored, breath sounds are clear bilaterally, No wheezing, rales or rhonchi  Cardiovascular: S1 and S2, regular rate and rhythm, ZANA LSB  Gastrointestinal: Bowel Sounds present, soft, nontender.   Lymph: No peripheral edema. No cervical lymphadenopathy.  Neurological: Alert, no focal deficits        LABS: All Labs Reviewed:                        13.8   7.77  )-----------( 289      ( 26 Jan 2019 07:11 )             42.1                         13.6   7.98  )-----------( 299      ( 25 Jan 2019 08:00 )             42.6                         14.1   7.34  )-----------( 312      ( 24 Jan 2019 10:04 )             43.8     26 Jan 2019 07:11    142    |  107    |  16     ----------------------------<  85     3.9     |  28     |  0.73   25 Jan 2019 08:00    142    |  107    |  18     ----------------------------<  93     3.7     |  29     |  0.74   24 Jan 2019 10:04    139    |  103    |  20     ----------------------------<  100    4.7     |  31     |  1.10     Ca    8.9        26 Jan 2019 07:11  Ca    9.0        25 Jan 2019 08:00  Ca    9.5        24 Jan 2019 10:04  Mg     2.0       25 Jan 2019 08:00    TPro  8.1    /  Alb  3.7    /  TBili  0.7    /  DBili  x      /  AST  40     /  ALT  36     /  AlkPhos  99     24 Jan 2019 10:04    PT/INR - ( 26 Jan 2019 09:35 )   PT: 12.8 sec;   INR: 1.12 ratio         PTT - ( 26 Jan 2019 09:35 )  PTT:30.5 sec      Blood Culture:     01-25 @ 08:00  TSH: 2.41      Telemetry: a flutter with marely block    EKG: a flutter

## 2019-01-27 NOTE — PROGRESS NOTE ADULT - PROBLEM SELECTOR PLAN 1
- Likely secondary to Aflutter, per cardio  - Last ANITA 2017 showed hyperdynamic LV motion and EF > 55%  - TSH wnl  - Continue metoprolol as ordered  - D/W Cardio Dr. Dumont. Continue  Coumadin and bridge with Lovenox. D/w patient, risks and benefits discussed at length. Patient agreeable to start coumadin

## 2019-01-28 DIAGNOSIS — I48.92 UNSPECIFIED ATRIAL FLUTTER: ICD-10-CM

## 2019-01-28 LAB
APTT BLD: 37.4 SEC — HIGH (ref 28.5–37)
INR BLD: 1.12 RATIO — SIGNIFICANT CHANGE UP (ref 0.88–1.16)
PROTHROM AB SERPL-ACNC: 12.8 SEC — SIGNIFICANT CHANGE UP (ref 10–12.9)

## 2019-01-28 PROCEDURE — 99233 SBSQ HOSP IP/OBS HIGH 50: CPT

## 2019-01-28 RX ORDER — DIGOXIN 250 MCG
0.25 TABLET ORAL EVERY 6 HOURS
Qty: 0 | Refills: 0 | Status: COMPLETED | OUTPATIENT
Start: 2019-01-28 | End: 2019-01-29

## 2019-01-28 RX ORDER — WARFARIN SODIUM 2.5 MG/1
5 TABLET ORAL ONCE
Qty: 0 | Refills: 0 | Status: COMPLETED | OUTPATIENT
Start: 2019-01-28 | End: 2019-01-28

## 2019-01-28 RX ORDER — METOPROLOL TARTRATE 50 MG
25 TABLET ORAL
Qty: 0 | Refills: 0 | Status: DISCONTINUED | OUTPATIENT
Start: 2019-01-28 | End: 2019-01-30

## 2019-01-28 RX ADMIN — WARFARIN SODIUM 5 MILLIGRAM(S): 2.5 TABLET ORAL at 21:01

## 2019-01-28 RX ADMIN — Medication 0.25 MILLIGRAM(S): at 11:22

## 2019-01-28 RX ADMIN — ENOXAPARIN SODIUM 50 MILLIGRAM(S): 100 INJECTION SUBCUTANEOUS at 17:22

## 2019-01-28 RX ADMIN — ENOXAPARIN SODIUM 50 MILLIGRAM(S): 100 INJECTION SUBCUTANEOUS at 05:52

## 2019-01-28 RX ADMIN — Medication 81 MILLIGRAM(S): at 11:22

## 2019-01-28 RX ADMIN — Medication 0.25 MILLIGRAM(S): at 17:21

## 2019-01-28 RX ADMIN — Medication 25 MILLIGRAM(S): at 11:22

## 2019-01-28 RX ADMIN — Medication 25 MILLIGRAM(S): at 17:21

## 2019-01-28 NOTE — PROGRESS NOTE ADULT - SUBJECTIVE AND OBJECTIVE BOX
CHIEF COMPLAINT: Patient is a 78y old  Female who presents with a chief complaint of Presyncope, Tachycardia (27 Jan 2019 09:40)      HPI:  77 yo F PMH Paroxysmal Afib (not on AC), HTN, Aortic Stenosis, s/p MV replacement x2, Osteopenia presents with 3 days of heart pounding and presyncopal episode today. Pt states heart pounding and racing sensation started 6 weeks ago, intermittently after her MOH's surgery on right neck. The day after her MOH's surgery, she felt ill walking around her house prior to eating breakfast and fell from the couch onto the carpeted floor. Immediately awoke, no slurring,  Denies LOC, shaking, urination. She states she was not injured and did not seek medical care. For the last 3 days the heart racing and pounding has been daily and today she was walking around her house, prior to eating breakfast when she began to feel hot, nauseated, lightheaded and ill similar to the fall episode 6 weeks ago, so she laid down on the couch but denies LOC, shaking, urination. The feeling passed and her daughter decided to take her to the hospital. Sees her PCP q3 months for BP checks. No change in BP medication. Last saw Cardiology in early December, no changes were made, last echo was 1.5 years ago per patient. Pt recently had a DEXA scan performed showing change in lower vertebra, she is concerned as she has had lower back pain for several months but it has not inhibited her ability to perform ADLs. No other focal complaints at this time.     Denies changes in weight, appetite, lethargy or change in activity level, visual changes, room spinning, sweating, fevers, HA, chest pain or tightness, dyspnea, abdominal pain, vomiting, diarrhea, swelling of legs, recent travel or major life events.     In ED, VS T 97; -115 /83 RR 18  SPO2 98on RA   EKG: Accelerated Junctional Rhythm vs 1st  degree AV block,, rate 101  Labs: CBC wnl, coag wnl, BMP : AST elev 40, Trop neg,  CT head: no acute hemorrhage or infarct  CXR: hyperinflated lungs    Received 1 L NS bolus in ED (24 Jan 2019 12:10)      Subjective:        MEDICATIONS  (STANDING):  aspirin enteric coated 81 milliGRAM(s) Oral daily  enoxaparin Injectable 50 milliGRAM(s) SubCutaneous every 12 hours  metoprolol tartrate 50 milliGRAM(s) Oral two times a day  warfarin 5 milliGRAM(s) Oral once    MEDICATIONS  (PRN):  acetaminophen   Tablet .. 650 milliGRAM(s) Oral every 6 hours PRN Temp greater or equal to 38C (100.4F), Mild Pain (1 - 3)      Allergies    adhesives (Urticaria)  No Known Drug Allergies    Intolerances    [facility] PT (Unknown)      REVIEW OF SYSTEMS:    CONSTITUTIONAL: No weakness, fevers or chills  EYES/ENT: No visual changes;  No vertigo or throat pain   NECK: No pain or stiffness  RESPIRATORY: No cough, wheezing, hemoptysis; No shortness of breath  CARDIOVASCULAR: No chest pain or palpitations  GASTROINTESTINAL: No abdominal pain. No nausea, vomiting, or hematemesis; No diarrhea or constipation. No melena or hematochezia.  GENITOURINARY: No dysuria, frequency or hematuria  NEUROLOGICAL: No numbness or weakness  SKIN: No itching or rash  All other review of systems is negative unless indicated above    VITAL SIGNS:   Vital Signs Last 24 Hrs  T(C): 36.6 (28 Jan 2019 07:59), Max: 36.6 (27 Jan 2019 19:58)  T(F): 97.8 (28 Jan 2019 07:59), Max: 97.8 (27 Jan 2019 19:58)  HR: 105 (28 Jan 2019 07:59) (80 - 105)  BP: 97/63 (28 Jan 2019 07:59) (92/63 - 105/70)  BP(mean): --  RR: 17 (28 Jan 2019 07:59) (17 - 17)  SpO2: 98% (28 Jan 2019 07:59) (97% - 100%)    I&O's Summary      PHYSICAL EXAM:    Constitutional: NAD, awake and alert, well-developed  Eyes:  EOMI,  Pupils round, No oral cyanosis.  Pulmonary: Non-labored, breath sounds are clear bilaterally, No wheezing, rales or rhonchi  Cardiovascular: S1 and S2, regular rate and rhythm, no Murmurs, gallops or rubs  Gastrointestinal: Bowel Sounds present, soft, nontender.   Lymph: No peripheral edema. No cervical lymphadenopathy.  Neurological: Alert, no focal deficits  Skin: No rashes.  Psych:  Mood & affect appropriate    LABS: All Labs Reviewed:                        13.8   7.77  )-----------( 289      ( 26 Jan 2019 07:11 )             42.1     26 Jan 2019 07:11    142    |  107    |  16     ----------------------------<  85     3.9     |  28     |  0.73     Ca    8.9        26 Jan 2019 07:11      PT/INR - ( 28 Jan 2019 07:52 )   PT: 12.8 sec;   INR: 1.12 ratio         PTT - ( 28 Jan 2019 07:52 )  PTT:37.4 sec            Telemetry: A.flutter with -130 s at time . , Average 90-100s         Imaging: Reviewed      EXAM:  ECHO TTE WO CON COMP W DOPPLR         PROCEDURE DATE:  01/25/2019        INTERPRETATION:  Ordering Physician: SAAD HAIDER [id number]    Indication:MVR  Study Quality: TDS   A complete echocardiographic study was performed utilizing standard   protocol including spectral and color Doppler in all echocardiographic   windows.    Height: 165 cm  Weight: 52kg  BSA:1.56m2  Blood Pressure: 111/73    MEASUREMENTS  IVS: 0.9cm  PWT: 0.6cm  LA: 3.3cm  AO: 3.1cm  LVIDd: 4.2cm  LVIDs: 3.6cm      LVEF: 60%  RVSP: 40mmHg    FINDINGS  Left Ventricle: Normal size and systolic function   Aortic Valve: Appears to be tricuspid with leaflet calcifications noted.   Mildly reduced systolic excursion with moderate aortic insufficiency noted  Mitral Valve:Normal functioning prosthetic valve noted . Torn chordae    noted  Tricuspid Valve: Severe TR   Pulmonic Valve: Normal   Left Atrium: Dilated  Right Ventricle: Normal size and function   Right Atrium: Dilated  Diastolic Function: Normal  Pericardium/Pleura: No effusion noted      CONCLUSIONS:  TDS    Valvular heart disease with mild AS & moderate AI  Normal functioning MVR  Severe TR with mild pulmonary hypertension  Biatrial dilatation  Normal systolic function of the left ventricle  Clinical correlation is advised  Antibiotic prophylaxis indicated        [facility]  This document has been electronically signed. Jan 26 2019  7:37AM CHIEF COMPLAINT: Patient is a 78y old  Female who presents with a chief complaint of Presyncope, Tachycardia (27 Jan 2019 09:40)      HPI:  77 yo F PMH Paroxysmal Afib (not on AC), HTN, Aortic Stenosis, s/p MV replacement x2, Osteopenia presents with 3 days of heart pounding and presyncopal episode today. Pt states heart pounding and racing sensation started 6 weeks ago, intermittently after her MOH's surgery on right neck. The day after her MOH's surgery, she felt ill walking around her house prior to eating breakfast and fell from the couch onto the carpeted floor. Immediately awoke, no slurring,  Denies LOC, shaking, urination. She states she was not injured and did not seek medical care. For the last 3 days the heart racing and pounding has been daily and today she was walking around her house, prior to eating breakfast when she began to feel hot, nauseated, lightheaded and ill similar to the fall episode 6 weeks ago, so she laid down on the couch but denies LOC, shaking, urination. The feeling passed and her daughter decided to take her to the hospital. Sees her PCP q3 months for BP checks. No change in BP medication. Last saw Cardiology in early December, no changes were made, last echo was 1.5 years ago per patient. Pt recently had a DEXA scan performed showing change in lower vertebra, she is concerned as she has had lower back pain for several months but it has not inhibited her ability to perform ADLs. No other focal complaints at this time.     Denies changes in weight, appetite, lethargy or change in activity level, visual changes, room spinning, sweating, fevers, HA, chest pain or tightness, dyspnea, abdominal pain, vomiting, diarrhea, swelling of legs, recent travel or major life events.     In ED, VS T 97; -115 /83 RR 18  SPO2 98on RA   EKG: Accelerated Junctional Rhythm vs 1st  degree AV block,, rate 101  Labs: CBC wnl, coag wnl, BMP : AST elev 40, Trop neg,  CT head: no acute hemorrhage or infarct  CXR: hyperinflated lungs    Received 1 L NS bolus in ED (24 Jan 2019 12:10)      Subjective: Pt was seen and examined at bedside , feels same /palpitation+        MEDICATIONS  (STANDING):  aspirin enteric coated 81 milliGRAM(s) Oral daily  enoxaparin Injectable 50 milliGRAM(s) SubCutaneous every 12 hours  metoprolol tartrate 50 milliGRAM(s) Oral two times a day  warfarin 5 milliGRAM(s) Oral once    MEDICATIONS  (PRN):  acetaminophen   Tablet .. 650 milliGRAM(s) Oral every 6 hours PRN Temp greater or equal to 38C (100.4F), Mild Pain (1 - 3)      Allergies    adhesives (Urticaria)  No Known Drug Allergies    Intolerances    [facility] PT (Unknown)      REVIEW OF SYSTEMS:    CONSTITUTIONAL: No weakness, fevers or chills  EYES/ENT: No visual changes;  No vertigo or throat pain   NECK: No pain or stiffness  RESPIRATORY: No cough, wheezing, hemoptysis; No shortness of breath  CARDIOVASCULAR: No chest pain or palpitations  GASTROINTESTINAL: No abdominal pain. No nausea, vomiting, or hematemesis; No diarrhea or constipation. No melena or hematochezia.  GENITOURINARY: No dysuria, frequency or hematuria  NEUROLOGICAL: No numbness or weakness  SKIN: No itching or rash  All other review of systems is negative unless indicated above    VITAL SIGNS:   Vital Signs Last 24 Hrs  T(C): 36.6 (28 Jan 2019 07:59), Max: 36.6 (27 Jan 2019 19:58)  T(F): 97.8 (28 Jan 2019 07:59), Max: 97.8 (27 Jan 2019 19:58)  HR: 105 (28 Jan 2019 07:59) (80 - 105)  BP: 97/63 (28 Jan 2019 07:59) (92/63 - 105/70)  BP(mean): --  RR: 17 (28 Jan 2019 07:59) (17 - 17)  SpO2: 98% (28 Jan 2019 07:59) (97% - 100%)    I&O's Summary      PHYSICAL EXAM:    Constitutional: NAD, awake and alert, well-developed  Eyes:  EOMI,  Pupils round, No oral cyanosis.  Pulmonary: Non-labored, breath sounds are clear bilaterally, No wheezing, rales or rhonchi  Cardiovascular: S1 and S2, regular rate and rhythm, no Murmurs, gallops or rubs  Gastrointestinal: Bowel Sounds present, soft, nontender.   Lymph: No peripheral edema. No cervical lymphadenopathy.  Neurological: Alert, no focal deficits  Skin: No rashes.  Psych:  Mood & affect appropriate    LABS: All Labs Reviewed:                        13.8   7.77  )-----------( 289      ( 26 Jan 2019 07:11 )             42.1     26 Jan 2019 07:11    142    |  107    |  16     ----------------------------<  85     3.9     |  28     |  0.73     Ca    8.9        26 Jan 2019 07:11      PT/INR - ( 28 Jan 2019 07:52 )   PT: 12.8 sec;   INR: 1.12 ratio         PTT - ( 28 Jan 2019 07:52 )  PTT:37.4 sec            Telemetry: A.flutter with -130 s at time . , Average 90-100s         Imaging: Reviewed      EXAM:  ECHO TTE WO CON COMP W DOPPLR         PROCEDURE DATE:  01/25/2019        INTERPRETATION:  Ordering Physician: SAAD HAIDER [id number]    Indication:MVR  Study Quality: TDS   A complete echocardiographic study was performed utilizing standard   protocol including spectral and color Doppler in all echocardiographic   windows.    Height: 165 cm  Weight: 52kg  BSA:1.56m2  Blood Pressure: 111/73    MEASUREMENTS  IVS: 0.9cm  PWT: 0.6cm  LA: 3.3cm  AO: 3.1cm  LVIDd: 4.2cm  LVIDs: 3.6cm      LVEF: 60%  RVSP: 40mmHg    FINDINGS  Left Ventricle: Normal size and systolic function   Aortic Valve: Appears to be tricuspid with leaflet calcifications noted.   Mildly reduced systolic excursion with moderate aortic insufficiency noted  Mitral Valve:Normal functioning prosthetic valve noted . Torn chordae    noted  Tricuspid Valve: Severe TR   Pulmonic Valve: Normal   Left Atrium: Dilated  Right Ventricle: Normal size and function   Right Atrium: Dilated  Diastolic Function: Normal  Pericardium/Pleura: No effusion noted      CONCLUSIONS:  TDS    Valvular heart disease with mild AS & moderate AI  Normal functioning MVR  Severe TR with mild pulmonary hypertension  Biatrial dilatation  Normal systolic function of the left ventricle  Clinical correlation is advised  Antibiotic prophylaxis indicated        [facility]  This document has been electronically signed. Jan 26 2019  7:37AM

## 2019-01-28 NOTE — PROGRESS NOTE ADULT - SUBJECTIVE AND OBJECTIVE BOX
Patient is a 78y old  Female who presents with a chief complaint of Presyncope, Tachycardia (28 Jan 2019 08:56)      HPI:  77 yo F PMH Paroxysmal Afib (not on AC), HTN, Aortic Stenosis, s/p MV replacement x2, Osteopenia presents with 3 days of heart pounding and presyncopal episode today. Pt states heart pounding and racing sensation started 6 weeks ago, intermittently after her MOH's surgery on right neck. The day after her MOH's surgery, she felt ill walking around her house prior to eating breakfast and fell from the couch onto the carpeted floor. Immediately awoke, no slurring,  Denies LOC, shaking, urination. She states she was not injured and did not seek medical care. For the last 3 days the heart racing and pounding has been daily and today she was walking around her house, prior to eating breakfast when she began to feel hot, nauseated, lightheaded and ill similar to the fall episode 6 weeks ago, so she laid down on the couch but denies LOC, shaking, urination. The feeling passed and her daughter decided to take her to the hospital. Sees her PCP q3 months for BP checks. No change in BP medication. Last saw Cardiology in early December, no changes were made, last echo was 1.5 years ago per patient. Pt recently had a DEXA scan performed showing change in lower vertebra, she is concerned as she has had lower back pain for several months but it has not inhibited her ability to perform ADLs. No other focal complaints at this time.     Denies changes in weight, appetite, lethargy or change in activity level, visual changes, room spinning, sweating, fevers, HA, chest pain or tightness, dyspnea, abdominal pain, vomiting, diarrhea, swelling of legs, recent travel or major life events.     In ED, VS T 97; -115 /83 RR 18  SPO2 98on RA   EKG: Accelerated Junctional Rhythm vs 1st  degree AV block,, rate 101  Labs: CBC wnl, coag wnl, BMP : AST elev 40, Trop neg,  CT head: no acute hemorrhage or infarct  CXR: hyperinflated lungs    Received 1 L NS bolus in ED (24 Jan 2019 12:10)      INTERVAL HPI/OVERNIGHT EVENTS: Pt seen and evaluated at the bedside. pt states she had small epistaxis this morning and a minor headache 4/10 relieved by tylenol. Epistaxis is similar to the last time she was on coumadin. No continued bleeding. Otherwise, states she still feels her heart pounding every time she stands up or walks. Denies CP, dyspnea, nausea, abdominal pain vomiting, dizziness, tinnitis, changes in vision.       T(C): 36.6 (01-28-19 @ 07:59), Max: 36.6 (01-27-19 @ 19:58)  HR: 105 (01-28-19 @ 07:59) (80 - 105)  BP: 97/63 (01-28-19 @ 07:59) (92/63 - 105/70)  RR: 17 (01-28-19 @ 07:59) (17 - 17)  SpO2: 98% (01-28-19 @ 07:59) (97% - 100%)  Wt(kg): --  I&O's Summary      REVIEW OF SYSTEMS:  CONSTITUTIONAL: denies fever, chills, fatigue, weakness  HEENT: denies blurred vision, sore throat  SKIN: denies new lesions, rash  CARDIOVASCULAR: denies chest pain, chest pressure, palpitations  RESPIRATORY: denies shortness of breath, sputum production  GASTROINTESTINAL: denies nausea, vomiting, diarrhea, abdominal pain  GENITOURINARY: denies dysuria, discharge  NEUROLOGICAL: denies numbness, headache, focal weakness  MUSCULOSKELETAL: denies new joint pain, muscle aches  HEMATOLOGIC: denies gross bleeding, bruising  LYMPHATICS: denies enlarged lymph nodes, extremity swelling  PSYCHIATRIC: denies recent changes in anxiety, depression  ENDOCRINOLOGIC: denies sweating, cold or heat intolerance    PHYSICAL EXAM:  GENERAL: frail, elderly  female in no acute distress, appropriate, pleasant  EYES: sclera clear, no exudates  ENMT: oropharynx clear without erythema, no exudates, moist mucous membranes  NECK: supple, soft  LUNGS: clear to auscultation, no wheezing or rhonchi appreciated  HEART: S1/S2, regular rate and rhythm, no murmurs noted, no lower extremity edema  GASTROINTESTINAL: abdomen is soft, nontender, nondistended, normoactive bowel sounds, no palpable masses  INTEGUMENT: good skin turgor  MUSCULOSKELETAL: no clubbing or cyanosis, no obvious deformity  NEUROLOGIC: awake, alert, oriented x3, good muscle tone in 4 extremities, no obvious sensory deficits  PSYCHIATRIC: mood is good, affect is congruent, linear and logical thought process  HEME/LYMPH:  no obvious ecchymosis or petechiae    MEDICATIONS  (STANDING):  aspirin enteric coated 81 milliGRAM(s) Oral daily  digoxin  Injectable 0.25 milliGRAM(s) IV Push every 6 hours  enoxaparin Injectable 50 milliGRAM(s) SubCutaneous every 12 hours  metoprolol tartrate 25 milliGRAM(s) Oral four times a day  warfarin 5 milliGRAM(s) Oral once    MEDICATIONS  (PRN):  acetaminophen   Tablet .. 650 milliGRAM(s) Oral every 6 hours PRN Temp greater or equal to 38C (100.4F), Mild Pain (1 - 3)      Labs:  PT/INR - ( 28 Jan 2019 07:52 )   PT: 12.8 sec;   INR: 1.12 ratio    PTT - ( 28 Jan 2019 07:52 )  PTT:37.4 sec Patient is a 78y old  Female who presents with a chief complaint of Presyncope, Tachycardia (28 Jan 2019 08:56)      HPI:  79 yo F PMH Paroxysmal Afib (not on AC), HTN, Aortic Stenosis, s/p MV replacement x2, Osteopenia presents with 3 days of heart pounding and presyncopal episode today. Pt states heart pounding and racing sensation started 6 weeks ago, intermittently after her MOH's surgery on right neck. The day after her MOH's surgery, she felt ill walking around her house prior to eating breakfast and fell from the couch onto the carpeted floor. Immediately awoke, no slurring,  Denies LOC, shaking, urination. She states she was not injured and did not seek medical care. For the last 3 days the heart racing and pounding has been daily and today she was walking around her house, prior to eating breakfast when she began to feel hot, nauseated, lightheaded and ill similar to the fall episode 6 weeks ago, so she laid down on the couch but denies LOC, shaking, urination. The feeling passed and her daughter decided to take her to the hospital. Sees her PCP q3 months for BP checks. No change in BP medication. Last saw Cardiology in early December, no changes were made, last echo was 1.5 years ago per patient. Pt recently had a DEXA scan performed showing change in lower vertebra, she is concerned as she has had lower back pain for several months but it has not inhibited her ability to perform ADLs. No other focal complaints at this time.     Denies changes in weight, appetite, lethargy or change in activity level, visual changes, room spinning, sweating, fevers, HA, chest pain or tightness, dyspnea, abdominal pain, vomiting, diarrhea, swelling of legs, recent travel or major life events.     In ED, VS T 97; -115 /83 RR 18  SPO2 98on RA   EKG: Accelerated Junctional Rhythm vs 1st  degree AV block,, rate 101  Labs: CBC wnl, coag wnl, BMP : AST elev 40, Trop neg,  CT head: no acute hemorrhage or infarct  CXR: hyperinflated lungs    Received 1 L NS bolus in ED (24 Jan 2019 12:10)      INTERVAL HPI/OVERNIGHT EVENTS: Pt seen and evaluated at the bedside. pt states she had small epistaxis this morning and a minor headache 4/10 relieved by tylenol. Epistaxis is similar to the last time she was on coumadin. No continued bleeding. Otherwise, states she still feels her heart pounding every time she stands up or walks. Denies CP, dyspnea, nausea, abdominal pain vomiting, dizziness, tinnitis, changes in vision.       T(C): 36.6 (01-28-19 @ 07:59), Max: 36.6 (01-27-19 @ 19:58)  HR: 105 (01-28-19 @ 07:59) (80 - 105)  BP: 97/63 (01-28-19 @ 07:59) (92/63 - 105/70)  RR: 17 (01-28-19 @ 07:59) (17 - 17)  SpO2: 98% (01-28-19 @ 07:59) (97% - 100%)  Wt(kg): --  I&O's Summary      REVIEW OF SYSTEMS:  CONSTITUTIONAL: denies fever, chills, fatigue, weakness  HEENT: denies blurred vision, sore throat  SKIN: denies new lesions, rash  CARDIOVASCULAR: denies chest pain, chest pressure, palpitations  RESPIRATORY: denies shortness of breath, sputum production  GASTROINTESTINAL: denies nausea, vomiting, diarrhea, abdominal pain  GENITOURINARY: denies dysuria, discharge  NEUROLOGICAL: denies numbness, headache, focal weakness  MUSCULOSKELETAL: denies new joint pain, muscle aches  HEMATOLOGIC: denies gross bleeding, bruising  LYMPHATICS: denies enlarged lymph nodes, extremity swelling  PSYCHIATRIC: denies recent changes in anxiety, depression  ENDOCRINOLOGIC: denies sweating, cold or heat intolerance    PHYSICAL EXAM:  GENERAL: frail, elderly  female in no acute distress, appropriate, pleasant  EYES: sclera clear, no exudates  ENMT: oropharynx clear without erythema, no exudates, moist mucous membranes, no blood in nares  NECK: supple, soft  LUNGS: clear to auscultation, no wheezing or rhonchi appreciated  HEART: S1/S2, tachycardic, regular rhythm, no murmurs noted, no lower extremity edema  GASTROINTESTINAL: abdomen is soft, nontender, nondistended, normoactive bowel sounds, no palpable masses  INTEGUMENT: good skin turgor  MUSCULOSKELETAL: no clubbing or cyanosis, no obvious deformity  NEUROLOGIC: awake, alert, oriented x3, good muscle tone in 4 extremities, no obvious sensory deficits  PSYCHIATRIC: mood is good, affect is congruent, linear and logical thought process  HEME/LYMPH:  no obvious ecchymosis or petechiae    MEDICATIONS  (STANDING):  aspirin enteric coated 81 milliGRAM(s) Oral daily  digoxin  Injectable 0.25 milliGRAM(s) IV Push every 6 hours  enoxaparin Injectable 50 milliGRAM(s) SubCutaneous every 12 hours  metoprolol tartrate 25 milliGRAM(s) Oral four times a day  warfarin 5 milliGRAM(s) Oral once    MEDICATIONS  (PRN):  acetaminophen   Tablet .. 650 milliGRAM(s) Oral every 6 hours PRN Temp greater or equal to 38C (100.4F), Mild Pain (1 - 3)      Labs:  PT/INR - ( 28 Jan 2019 07:52 )   PT: 12.8 sec;   INR: 1.12 ratio    PTT - ( 28 Jan 2019 07:52 )  PTT:37.4 sec

## 2019-01-28 NOTE — PROGRESS NOTE ADULT - PROBLEM SELECTOR PLAN 1
- Likely secondary to Aflutter, per cardio  - Last ANITA 2017 showed hyperdynamic LV motion and EF > 55%  - Increase Coumadin to 5. INR currently 1.12 on Coumadin 3 mg x 1 day  - Epistaxis stopped, will monitor for bleeding  - Continue  Coumadin and bridge with Lovenox.   - Cardio Dr. Dumont, appreciate recs - Likely secondary to Aflutter  - Last ANITA 2017 showed hyperdynamic LV motion and EF > 55%  - Increase Coumadin to 5. INR currently 1.12 on Coumadin 3 mg x 1 day  - Epistaxis stopped, will monitor for bleeding  - Continue  Coumadin and bridge with Lovenox.   - Cardio Dr. Dumont, appreciate recs

## 2019-01-28 NOTE — PROGRESS NOTE ADULT - PROBLEM SELECTOR PLAN 2
Unable to give beta blocker due to BP parameters  - Starting Digoxin 0.25 q 6 , stop after 4 doses  for rate control  - Change metoprolol 25 mg QID  - Coumadin dose bridge with Lovenox  - Cardio Dr. Dumont, appreciate recommendations

## 2019-01-28 NOTE — PROGRESS NOTE ADULT - PROBLEM SELECTOR PLAN 2
patient s/p 2 MV replacements with bioprosthetic valves. Most recent one done at [facility] by Dr. Terry Kinney about 1 1/2 years ago.   Echo noted with normal MVR

## 2019-01-28 NOTE — PROGRESS NOTE ADULT - PROBLEM SELECTOR PLAN 1
HR is 90-100s , On metoprolol 50mg BID , BP borderline .   OAC with warfarin to INR 2-3 recommended. Pt had been on warfarin for 6 months post-MVR then discontinued & is aware of risks/benefits. HR is 90-100s , On metoprolol 50mg BID , missing the doses as BP borderline . Will change it to 25mg q 6hrs with holding parameters   OAC with warfarin to INR 2-3 recommended. Pt had been on warfarin for 6 months post-MVR then discontinued & restarted warfarin again . She is aware of risks/benefits.   Monitor INR 1.1 today , dose 5mg tonight

## 2019-01-29 LAB
INR BLD: 1.17 RATIO — HIGH (ref 0.88–1.16)
PROTHROM AB SERPL-ACNC: 13.3 SEC — HIGH (ref 10–12.9)

## 2019-01-29 PROCEDURE — 99233 SBSQ HOSP IP/OBS HIGH 50: CPT

## 2019-01-29 RX ORDER — WARFARIN SODIUM 2.5 MG/1
7.5 TABLET ORAL ONCE
Qty: 0 | Refills: 0 | Status: COMPLETED | OUTPATIENT
Start: 2019-01-29 | End: 2019-01-29

## 2019-01-29 RX ORDER — DIGOXIN 250 MCG
0.25 TABLET ORAL ONCE
Qty: 0 | Refills: 0 | Status: COMPLETED | OUTPATIENT
Start: 2019-01-29 | End: 2019-01-29

## 2019-01-29 RX ORDER — METOPROLOL TARTRATE 50 MG
25 TABLET ORAL ONCE
Qty: 0 | Refills: 0 | Status: DISCONTINUED | OUTPATIENT
Start: 2019-01-29 | End: 2019-01-29

## 2019-01-29 RX ORDER — METOPROLOL TARTRATE 50 MG
25 TABLET ORAL ONCE
Qty: 0 | Refills: 0 | Status: COMPLETED | OUTPATIENT
Start: 2019-01-29 | End: 2019-01-29

## 2019-01-29 RX ADMIN — Medication 25 MILLIGRAM(S): at 08:30

## 2019-01-29 RX ADMIN — Medication 81 MILLIGRAM(S): at 11:27

## 2019-01-29 RX ADMIN — Medication 25 MILLIGRAM(S): at 11:27

## 2019-01-29 RX ADMIN — WARFARIN SODIUM 7.5 MILLIGRAM(S): 2.5 TABLET ORAL at 21:55

## 2019-01-29 RX ADMIN — ENOXAPARIN SODIUM 50 MILLIGRAM(S): 100 INJECTION SUBCUTANEOUS at 05:19

## 2019-01-29 RX ADMIN — ENOXAPARIN SODIUM 50 MILLIGRAM(S): 100 INJECTION SUBCUTANEOUS at 17:41

## 2019-01-29 RX ADMIN — Medication 0.25 MILLIGRAM(S): at 08:29

## 2019-01-29 NOTE — PROGRESS NOTE ADULT - PROBLEM SELECTOR PLAN 1
HR is 90-100s during day time, during sleep 50-60s , overall better controlled and improvement in symptoms   Pt had been on warfarin for 6 months post-MVR then discontinued & restarted warfarin again . She is aware of risks/benefits.   Monitor INR 1.17 today , dose 7. 5mg tonight.

## 2019-01-29 NOTE — PROGRESS NOTE ADULT - PROBLEM SELECTOR PLAN 3
Probably secondary to rapid rates   Continue cardiac monitor   Echo reviewed   HR better controlled.

## 2019-01-29 NOTE — PROGRESS NOTE ADULT - PROBLEM SELECTOR PLAN 2
- Rate controlled s/p  Digoxin 0.25 q 6 , stop after 4 doses   - Will continue to monitor rates off Digoxin  - Continue metoprolol 25 mg QID   - Anticoag:  Increase Coumadin  7.5 mg tonight, INR currently 1.17 on Coumadin 5 mg yesterday.  - Continue bridge with Lovenox.   - Cardio Dr. Dumont, appreciate recommendations

## 2019-01-29 NOTE — PROGRESS NOTE ADULT - SUBJECTIVE AND OBJECTIVE BOX
CHIEF COMPLAINT: Patient is a 78y old  Female who presents with a chief complaint of Presyncope, Tachycardia (28 Jan 2019 11:09)      HPI:  77 yo F PMH Paroxysmal Afib (not on AC), HTN, Aortic Stenosis, s/p MV replacement x2, Osteopenia presents with 3 days of heart pounding and presyncopal episode today. Pt states heart pounding and racing sensation started 6 weeks ago, intermittently after her MOH's surgery on right neck. The day after her MOH's surgery, she felt ill walking around her house prior to eating breakfast and fell from the couch onto the carpeted floor. Immediately awoke, no slurring,  Denies LOC, shaking, urination. She states she was not injured and did not seek medical care. For the last 3 days the heart racing and pounding has been daily and today she was walking around her house, prior to eating breakfast when she began to feel hot, nauseated, lightheaded and ill similar to the fall episode 6 weeks ago, so she laid down on the couch but denies LOC, shaking, urination. The feeling passed and her daughter decided to take her to the hospital. Sees her PCP q3 months for BP checks. No change in BP medication. Last saw Cardiology in early December, no changes were made, last echo was 1.5 years ago per patient. Pt recently had a DEXA scan performed showing change in lower vertebra, she is concerned as she has had lower back pain for several months but it has not inhibited her ability to perform ADLs. No other focal complaints at this time.     Denies changes in weight, appetite, lethargy or change in activity level, visual changes, room spinning, sweating, fevers, HA, chest pain or tightness, dyspnea, abdominal pain, vomiting, diarrhea, swelling of legs, recent travel or major life events.     In ED, VS T 97; -115 /83 RR 18  SPO2 98on RA   EKG: Accelerated Junctional Rhythm vs 1st  degree AV block,, rate 101  Labs: CBC wnl, coag wnl, BMP : AST elev 40, Trop neg,  CT head: no acute hemorrhage or infarct  CXR: hyperinflated lungs    Received 1 L NS bolus in ED (24 Jan 2019 12:10)      Subjective: Pt was seen and examined at bedside , feeling better         MEDICATIONS  (STANDING):  aspirin enteric coated 81 milliGRAM(s) Oral daily  enoxaparin Injectable 50 milliGRAM(s) SubCutaneous every 12 hours  metoprolol tartrate 25 milliGRAM(s) Oral four times a day    MEDICATIONS  (PRN):  acetaminophen   Tablet .. 650 milliGRAM(s) Oral every 6 hours PRN Temp greater or equal to 38C (100.4F), Mild Pain (1 - 3)      Allergies    adhesives (Urticaria)  No Known Drug Allergies    Intolerances    [facility] PT (Unknown)      REVIEW OF SYSTEMS:    CONSTITUTIONAL: No weakness, fevers or chills  EYES/ENT: No visual changes;  No vertigo or throat pain   NECK: No pain or stiffness  RESPIRATORY: No cough, wheezing, hemoptysis; No shortness of breath  CARDIOVASCULAR: No chest pain .  palpitations improving   GASTROINTESTINAL: No abdominal pain. No nausea, vomiting, or hematemesis; No diarrhea or constipation. No melena or hematochezia.  GENITOURINARY: No dysuria, frequency or hematuria  NEUROLOGICAL: No numbness or weakness  SKIN: No itching or rash  All other review of systems is negative unless indicated above    VITAL SIGNS:   Vital Signs Last 24 Hrs  T(C): 36.6 (29 Jan 2019 07:25), Max: 36.9 (28 Jan 2019 16:25)  T(F): 97.9 (29 Jan 2019 07:25), Max: 98.4 (28 Jan 2019 16:25)  HR: 77 (29 Jan 2019 07:25) (53 - 102)  BP: 98/61 (29 Jan 2019 07:25) (96/56 - 105/72)  BP(mean): --  RR: 18 (29 Jan 2019 07:25) (17 - 18)  SpO2: 100% (29 Jan 2019 07:25) (97% - 100%)    I&O's Summary    28 Jan 2019 07:01  -  29 Jan 2019 07:00  --------------------------------------------------------  IN: 630 mL / OUT: 0 mL / NET: 630 mL        PHYSICAL EXAM:    Constitutional:  awake and alert, well-developed  Eyes:  EOMI,  Pupils round, No oral cyanosis.  Pulmonary: Non-labored, breath sounds are clear bilaterally, No wheezing, rales or rhonchi  Cardiovascular: S1 and S2, regular rate and rhythm, PSM 2/6  Gastrointestinal: Bowel Sounds present, soft, nontender.   Lymph: No peripheral edema. No cervical lymphadenopathy.  Neurological: Alert, no focal deficits  Skin: No rashes.  Psych:  Mood & affect appropriate    LABS: All Labs Reviewed:          PT/INR - ( 29 Jan 2019 06:15 )   PT: 13.3 sec;   INR: 1.17 ratio         PTT - ( 28 Jan 2019 07:52 )  PTT:37.4 sec            Telemetry: A.fluttter in 100s , at night during sleep 50-70s     Imaging: Reviewed     Study Quality: TDS   A complete echocardiographic study was performed utilizing standard   protocol including spectral and color Doppler in all echocardiographic   windows.    Height: 165 cm  Weight: 52kg  BSA:1.56m2  Blood Pressure: 111/73    MEASUREMENTS  IVS: 0.9cm  PWT: 0.6cm  LA: 3.3cm  AO: 3.1cm  LVIDd: 4.2cm  LVIDs: 3.6cm      LVEF: 60%  RVSP: 40mmHg    FINDINGS  Left Ventricle: Normal size and systolic function   Aortic Valve: Appears to be tricuspid with leaflet calcifications noted.   Mildly reduced systolic excursion with moderate aortic insufficiency noted  Mitral Valve:Normal functioning prosthetic valve noted . Torn chordae    noted  Tricuspid Valve: Severe TR   Pulmonic Valve: Normal   Left Atrium: Dilated  Right Ventricle: Normal size and function   Right Atrium: Dilated  Diastolic Function: Normal  Pericardium/Pleura: No effusion noted      CONCLUSIONS:  TDS    Valvular heart disease with mild AS & moderate AI  Normal functioning MVR  Severe TR with mild pulmonary hypertension  Biatrial dilatation  Normal systolic function of the left ventricle  Clinical correlation is advised  Antibiotic prophylaxis indicated        [facility]  This document has been electronically signed. Jan 26 2019  7:37AM

## 2019-01-29 NOTE — PROGRESS NOTE ADULT - PROBLEM SELECTOR PLAN 1
- Likely secondary to Aflutter  - Last ANITA 2017 showed hyperdynamic LV motion and EF > 55%  - Cardio Dr. Dumont, appreciate recs

## 2019-01-29 NOTE — PROGRESS NOTE ADULT - PROBLEM SELECTOR PLAN 4
- Chronic, no current bone pain  - On home calcium, continue

## 2019-01-29 NOTE — PHARMACOTHERAPY INTERVENTION NOTE - COMMENTS
s/w Dr. Gonzalez and suggested 5 mg of warfarin for the patient to get a more consistent dosing for the patient, but the md wants to continue with the 7.5 mg

## 2019-01-29 NOTE — PROGRESS NOTE ADULT - SUBJECTIVE AND OBJECTIVE BOX
Patient is a 78y old  Female who presents with a chief complaint of Presyncope, Tachycardia (29 Jan 2019 08:17)      INTERVAL HPI/OVERNIGHT EVENTS: Patient seen and examined at bedside. Pt states she feels better after digoxin started. Her heart pounding is less pronounced when she stands up and uses the washroom.  Denies CP, dysnpea, nausea, vomiting, diarrhea, dizziness, headache, nosebleed.    MEDICATIONS  (STANDING):  aspirin enteric coated 81 milliGRAM(s) Oral daily  enoxaparin Injectable 50 milliGRAM(s) SubCutaneous every 12 hours  metoprolol tartrate 25 milliGRAM(s) Oral four times a day  warfarin 7.5 milliGRAM(s) Oral once    MEDICATIONS  (PRN):  acetaminophen   Tablet .. 650 milliGRAM(s) Oral every 6 hours PRN Temp greater or equal to 38C (100.4F), Mild Pain (1 - 3)      Allergies    adhesives (Urticaria)  No Known Drug Allergies    Intolerances    [facility] PT (Unknown)      REVIEW OF SYSTEMS:  CONSTITUTIONAL: No fever, weight loss, or fatigue  EYES: No eye pain, visual disturbances, or discharge  ENMT:  No difficulty hearing, tinnitus, vertigo; No sinus or throat pain  NECK: No pain or stiffness  RESPIRATORY: No cough, wheezing, chills or hemoptysis; No shortness of breath  CARDIOVASCULAR: No chest pain, palpitations, dizziness, or leg swelling  GASTROINTESTINAL: No abdominal or epigastric pain. No nausea, vomiting, or hematemesis; No diarrhea or constipation. No melena or hematochezia.  GENITOURINARY: No dysuria, frequency, hematuria, or incontinence  NEUROLOGICAL: No headaches, memory loss, loss of strength, numbness, or tremors  SKIN: No itching, burning, rashes, or lesions   LYMPH NODES: No enlarged glands  ENDOCRINE: No heat or cold intolerance; No hair loss; No polydipsia or polyuria  MUSCULOSKELETAL: No joint pain or swelling; No muscle, back, or extremity pain  PSYCHIATRIC: No depression, anxiety, mood swings, or difficulty sleeping  HEME/LYMPH: No easy bruising, or bleeding gums  ALLERGY AND IMMUNOLOGIC: No hives or eczema    Vital Signs Last 24 Hrs  T(C): 36.8 (29 Jan 2019 11:26), Max: 36.9 (28 Jan 2019 16:25)  T(F): 98.3 (29 Jan 2019 11:26), Max: 98.4 (28 Jan 2019 16:25)  HR: 69 (29 Jan 2019 11:26) (53 - 109)  BP: 108/68 (29 Jan 2019 11:26) (96/56 - 108/68)  BP(mean): --  RR: 18 (29 Jan 2019 11:26) (17 - 18)  SpO2: 97% (29 Jan 2019 11:26) (97% - 100%)    PHYSICAL EXAM:  GENERAL: frail, elderly  female in no acute distress, appropriate, pleasant  EYES: sclera clear, no exudates  ENMT: oropharynx clear without erythema, no exudates, moist mucous membranes, no blood in nares  NECK: supple, soft  LUNGS: clear to auscultation, no wheezing or rhonchi appreciated  HEART: S1/S2, regular rate and rhythm, no murmurs noted, no lower extremity edema  GASTROINTESTINAL: abdomen is soft, nontender, nondistended, normoactive bowel sounds, no palpable masses  INTEGUMENT: good skin turgor  MUSCULOSKELETAL: no clubbing or cyanosis, no obvious deformity  NEUROLOGIC: awake, alert, oriented x3, good muscle tone in 4 extremities, no obvious sensory deficits  PSYCHIATRIC: mood is good, affect is congruent, linear and logical thought process    LABS:    PT/INR - ( 29 Jan 2019 06:15 )   PT: 13.3 sec;   INR: 1.17 ratio    PTT - ( 28 Jan 2019 07:52 )  PTT:37.4 sec

## 2019-01-29 NOTE — PROGRESS NOTE ADULT - ASSESSMENT
9 yo F PMH Paroxysmal Afib (not on AC), HTN, Aortic Stenosis, s/p MV replacement x2,  Osteopenia presents with 3 days of heart pounding and presyncopal episode likely secondary to A Flutter
77 yo F PMH Paroxysmal Afib (not on AC), HTN, Aortic Stenosis, s/p MV replacement x2,  Osteopenia presents with 3 days of heart pounding and presyncopal episode likely secondary to A Flutter
7 yo F PMH Paroxysmal Afib (not on AC), HTN, Aortic Stenosis, s/p MV replacement x2,  Osteopenia presents with 3 days of heart pounding and presyncopal episode today.  Admitted for presyncope.
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
Problem/Recommendation - 1:  Problem: Pre-syncope. Recommendation: Increase activity on monitor.     Problem/Recommendation - 2:  ·  Problem: Mitral valve disease.  Recommendation: patient s/p 2 MV replacements with bioprosthetic valves. Most recent one done at [facility] by Dr. Terry Kinney about 1 1/2 years ago.   Echo noted with normal MVR     Problem/Recommendation - 3:  ·  Problem: Atrial flutter-  OAC with warfarin to INR 2-3 recommended. Pt had been on warfarin X 6 mns post-MVR & is aware of risks/benefits.   increase B blocker as tolerated -pt feels somewhat better this AM. HRs 
Problem/Recommendation - 1:  Problem: Pre-syncope. Recommendation: Increase activity on monitor.     Problem/Recommendation - 2:  ·  Problem: Mitral valve disease.  Recommendation: patient s/p 2 MV replacements with bioprosthetic valves. Most recent one done at [facility] by Dr. Terry Kinney about 1 1/2 years ago.   Echo noted with normal MVR     Problem/Recommendation - 3:  ·  Problem: Paroxysmal atrial fibrillation.  Recommendation: tele noted with likely a flutter with 3:1 conduction. Lengthy d/w pt & OAC with warfarin to INR 2-3 recommended. Pt had been on warfarin X 6 mns post-MVR & is aware of risks/benefits.   Check ECG & increase B blocker as tolerated D/w Dr Gonzalez
Problem/Recommendation - 1:  Problem: Pre-syncope. Recommendation: accelerated junctional vs. sinus tachycardia with 1st degree AV block. Patient did have an actual syncopal episode about 1 1/2 month ago. Continue telemetry monitoring and 2D echocardiogram. Need to monitor patient when able to ambulate on floor. Continue Aspirin 81mg daily and Lopressor 25mg BID (patient's home medications).    Problem/Recommendation - 2:  ·  Problem: Mitral valve disease.  Recommendation: patient s/p 2 MV replacements with bioprosthetic valves. Most recent one done at [facility] by Dr. Terry Kinney about 1 1/2 years ago. Echo.     Problem/Recommendation - 3:  ·  Problem: Paroxysmal atrial fibrillation.  Recommendation: patient states she had episodes of atrial fibrillation post-operatively after her MVR. she was treated for about 6 months with Amiodarone and Coumadin and these medications were subsequently discontinued. Tele monitoring for recurrence.
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

## 2019-01-29 NOTE — PROGRESS NOTE ADULT - PROBLEM SELECTOR PLAN 2
patient s/p 2 MV replacements with bioprosthetic valves. Most recent one done at [facility] by Dr. Terry Kinney about 1 1/2 years ago.   Echo noted with normal MVR.

## 2019-01-29 NOTE — PROGRESS NOTE ADULT - PROBLEM SELECTOR PLAN 5
- VTE score 1 for age:  Bridging to Coumadin with Lovenox  - Fall risk
- VTE score 1 for age:  Enoxaparin  - Fall risk
- VTE score 1 for age:  Bridging to Coumadin with Lovenox  - Fall risk

## 2019-01-30 VITALS
SYSTOLIC BLOOD PRESSURE: 92 MMHG | OXYGEN SATURATION: 97 % | TEMPERATURE: 98 F | RESPIRATION RATE: 17 BRPM | HEART RATE: 97 BPM | DIASTOLIC BLOOD PRESSURE: 61 MMHG

## 2019-01-30 LAB
ANION GAP SERPL CALC-SCNC: 7 MMOL/L — SIGNIFICANT CHANGE UP (ref 5–17)
APTT BLD: 36.3 SEC — SIGNIFICANT CHANGE UP (ref 28.5–37)
BUN SERPL-MCNC: 15 MG/DL — SIGNIFICANT CHANGE UP (ref 7–23)
CALCIUM SERPL-MCNC: 8.9 MG/DL — SIGNIFICANT CHANGE UP (ref 8.5–10.1)
CHLORIDE SERPL-SCNC: 104 MMOL/L — SIGNIFICANT CHANGE UP (ref 96–108)
CO2 SERPL-SCNC: 29 MMOL/L — SIGNIFICANT CHANGE UP (ref 22–31)
CREAT SERPL-MCNC: 0.85 MG/DL — SIGNIFICANT CHANGE UP (ref 0.5–1.3)
GLUCOSE SERPL-MCNC: 81 MG/DL — SIGNIFICANT CHANGE UP (ref 70–99)
HCT VFR BLD CALC: 42.2 % — SIGNIFICANT CHANGE UP (ref 34.5–45)
HGB BLD-MCNC: 13.5 G/DL — SIGNIFICANT CHANGE UP (ref 11.5–15.5)
INR BLD: 1.38 RATIO — HIGH (ref 0.88–1.16)
MCHC RBC-ENTMCNC: 27.8 PG — SIGNIFICANT CHANGE UP (ref 27–34)
MCHC RBC-ENTMCNC: 32 GM/DL — SIGNIFICANT CHANGE UP (ref 32–36)
MCV RBC AUTO: 87 FL — SIGNIFICANT CHANGE UP (ref 80–100)
NRBC # BLD: 0 /100 WBCS — SIGNIFICANT CHANGE UP (ref 0–0)
PLATELET # BLD AUTO: 270 K/UL — SIGNIFICANT CHANGE UP (ref 150–400)
POTASSIUM SERPL-MCNC: 4.2 MMOL/L — SIGNIFICANT CHANGE UP (ref 3.5–5.3)
POTASSIUM SERPL-SCNC: 4.2 MMOL/L — SIGNIFICANT CHANGE UP (ref 3.5–5.3)
PROTHROM AB SERPL-ACNC: 15.9 SEC — HIGH (ref 10–12.9)
RBC # BLD: 4.85 M/UL — SIGNIFICANT CHANGE UP (ref 3.8–5.2)
RBC # FLD: 14.1 % — SIGNIFICANT CHANGE UP (ref 10.3–14.5)
SODIUM SERPL-SCNC: 140 MMOL/L — SIGNIFICANT CHANGE UP (ref 135–145)
WBC # BLD: 8.17 K/UL — SIGNIFICANT CHANGE UP (ref 3.8–10.5)
WBC # FLD AUTO: 8.17 K/UL — SIGNIFICANT CHANGE UP (ref 3.8–10.5)

## 2019-01-30 PROCEDURE — 85027 COMPLETE CBC AUTOMATED: CPT

## 2019-01-30 PROCEDURE — 80053 COMPREHEN METABOLIC PANEL: CPT

## 2019-01-30 PROCEDURE — 93306 TTE W/DOPPLER COMPLETE: CPT

## 2019-01-30 PROCEDURE — 82550 ASSAY OF CK (CPK): CPT

## 2019-01-30 PROCEDURE — 83735 ASSAY OF MAGNESIUM: CPT

## 2019-01-30 PROCEDURE — 99239 HOSP IP/OBS DSCHRG MGMT >30: CPT

## 2019-01-30 PROCEDURE — 93005 ELECTROCARDIOGRAM TRACING: CPT

## 2019-01-30 PROCEDURE — 36415 COLL VENOUS BLD VENIPUNCTURE: CPT

## 2019-01-30 PROCEDURE — 71045 X-RAY EXAM CHEST 1 VIEW: CPT

## 2019-01-30 PROCEDURE — 85610 PROTHROMBIN TIME: CPT

## 2019-01-30 PROCEDURE — 99285 EMERGENCY DEPT VISIT HI MDM: CPT | Mod: 25

## 2019-01-30 PROCEDURE — 84484 ASSAY OF TROPONIN QUANT: CPT

## 2019-01-30 PROCEDURE — 84443 ASSAY THYROID STIM HORMONE: CPT

## 2019-01-30 PROCEDURE — 70450 CT HEAD/BRAIN W/O DYE: CPT

## 2019-01-30 PROCEDURE — 85730 THROMBOPLASTIN TIME PARTIAL: CPT

## 2019-01-30 PROCEDURE — 82553 CREATINE MB FRACTION: CPT

## 2019-01-30 PROCEDURE — 80048 BASIC METABOLIC PNL TOTAL CA: CPT

## 2019-01-30 RX ORDER — DIGOXIN 250 MCG
0.12 TABLET ORAL DAILY
Qty: 0 | Refills: 0 | Status: DISCONTINUED | OUTPATIENT
Start: 2019-01-30 | End: 2019-01-30

## 2019-01-30 RX ORDER — METOPROLOL TARTRATE 50 MG
1 TABLET ORAL
Qty: 0 | Refills: 1 | COMMUNITY

## 2019-01-30 RX ORDER — WARFARIN SODIUM 2.5 MG/1
1 TABLET ORAL
Qty: 2 | Refills: 0 | OUTPATIENT
Start: 2019-01-30 | End: 2019-01-31

## 2019-01-30 RX ORDER — DIGOXIN 250 MCG
1 TABLET ORAL
Qty: 7 | Refills: 0 | OUTPATIENT
Start: 2019-01-30 | End: 2019-02-05

## 2019-01-30 RX ORDER — METOPROLOL TARTRATE 50 MG
25 TABLET ORAL
Qty: 0 | Refills: 0 | Status: DISCONTINUED | OUTPATIENT
Start: 2019-01-30 | End: 2019-01-30

## 2019-01-30 RX ORDER — METOPROLOL TARTRATE 50 MG
1 TABLET ORAL
Qty: 14 | Refills: 1 | OUTPATIENT
Start: 2019-01-30 | End: 2019-02-12

## 2019-01-30 RX ADMIN — ENOXAPARIN SODIUM 50 MILLIGRAM(S): 100 INJECTION SUBCUTANEOUS at 05:52

## 2019-01-30 RX ADMIN — Medication 25 MILLIGRAM(S): at 00:56

## 2019-01-30 RX ADMIN — Medication 25 MILLIGRAM(S): at 05:52

## 2019-01-30 RX ADMIN — Medication 81 MILLIGRAM(S): at 11:23

## 2019-01-30 RX ADMIN — Medication 0.12 MILLIGRAM(S): at 11:24

## 2019-01-30 NOTE — PROGRESS NOTE ADULT - NSHPATTENDINGPLANDISCUSS_GEN_ALL_CORE
Patient , Dr. Gonzalez
Dr. Gonzalez
Dr. Gonzalez
Patient, Cardio, RN
Patient, Cardio, RN, SW/CM, Medicine Team
Patient,RN
Patient, RN, SW/CM, Medicine team
Pt, daughter, RN and resident.

## 2019-01-30 NOTE — PROGRESS NOTE ADULT - PROBLEM SELECTOR PROBLEM 3
Pre-syncope
HTN (hypertension), benign

## 2019-01-30 NOTE — PROGRESS NOTE ADULT - REASON FOR ADMISSION
Presyncope, Tachycardia

## 2019-01-30 NOTE — PROGRESS NOTE ADULT - PROVIDER SPECIALTY LIST ADULT
Cardiology
Hospitalist
Cardiology

## 2019-01-30 NOTE — PROGRESS NOTE ADULT - SUBJECTIVE AND OBJECTIVE BOX
CHIEF COMPLAINT: Patient is a 78y old  Female who presents with a chief complaint of Presyncope, Tachycardia (29 Jan 2019 11:43)      HPI:  79 yo F PMH Paroxysmal Afib (not on AC), HTN, Aortic Stenosis, s/p MV replacement x2, Osteopenia presents with 3 days of heart pounding and presyncopal episode today. Pt states heart pounding and racing sensation started 6 weeks ago, intermittently after her MOH's surgery on right neck. The day after her MOH's surgery, she felt ill walking around her house prior to eating breakfast and fell from the couch onto the carpeted floor. Immediately awoke, no slurring,  Denies LOC, shaking, urination. She states she was not injured and did not seek medical care. For the last 3 days the heart racing and pounding has been daily and today she was walking around her house, prior to eating breakfast when she began to feel hot, nauseated, lightheaded and ill similar to the fall episode 6 weeks ago, so she laid down on the couch but denies LOC, shaking, urination. The feeling passed and her daughter decided to take her to the hospital. Sees her PCP q3 months for BP checks. No change in BP medication. Last saw Cardiology in early December, no changes were made, last echo was 1.5 years ago per patient. Pt recently had a DEXA scan performed showing change in lower vertebra, she is concerned as she has had lower back pain for several months but it has not inhibited her ability to perform ADLs. No other focal complaints at this time.     Denies changes in weight, appetite, lethargy or change in activity level, visual changes, room spinning, sweating, fevers, HA, chest pain or tightness, dyspnea, abdominal pain, vomiting, diarrhea, swelling of legs, recent travel or major life events.     In ED, VS T 97; -115 /83 RR 18  SPO2 98on RA   EKG: Accelerated Junctional Rhythm vs 1st  degree AV block,, rate 101  Labs: CBC wnl, coag wnl, BMP : AST elev 40, Trop neg,  CT head: no acute hemorrhage or infarct  CXR: hyperinflated lungs    Received 1 L NS bolus in ED (24 Jan 2019 12:10)      Subjective: Pt was seen and examined at bedside , feels better . Occasional heart pounding with exertion        MEDICATIONS  (STANDING):  aspirin enteric coated 81 milliGRAM(s) Oral daily  digoxin     Tablet 0.125 milliGRAM(s) Oral daily  enoxaparin Injectable 50 milliGRAM(s) SubCutaneous every 12 hours  metoprolol tartrate 25 milliGRAM(s) Oral two times a day    MEDICATIONS  (PRN):  acetaminophen   Tablet .. 650 milliGRAM(s) Oral every 6 hours PRN Temp greater or equal to 38C (100.4F), Mild Pain (1 - 3)      Allergies    adhesives (Urticaria)  No Known Drug Allergies    Intolerances    [facility] PT (Unknown)      REVIEW OF SYSTEMS:    CONSTITUTIONAL: No fevers or chills  EYES/ENT: No visual changes;  No vertigo or throat pain   NECK: No pain or stiffness  RESPIRATORY: No cough, wheezing, hemoptysis; No shortness of breath  CARDIOVASCULAR: No chest pain . Occa palpitations  GASTROINTESTINAL: No abdominal pain. No nausea, vomiting, or hematemesis; No diarrhea or constipation. No melena or hematochezia.  GENITOURINARY: No dysuria, frequency or hematuria  NEUROLOGICAL: No numbness or weakness  SKIN: No itching or rash  All other review of systems is negative unless indicated above    VITAL SIGNS:   Vital Signs Last 24 Hrs  T(C): 36.6 (30 Jan 2019 08:00), Max: 37 (29 Jan 2019 20:05)  T(F): 97.9 (30 Jan 2019 08:00), Max: 98.6 (29 Jan 2019 20:05)  HR: 60 (30 Jan 2019 08:00) (52 - 105)  BP: 102/51 (30 Jan 2019 08:00) (91/59 - 112/75)  BP(mean): --  RR: 17 (30 Jan 2019 08:00) (16 - 18)  SpO2: 98% (30 Jan 2019 08:00) (97% - 99%)    I&O's Summary    29 Jan 2019 07:01  -  30 Jan 2019 07:00  --------------------------------------------------------  IN: 660 mL / OUT: 0 mL / NET: 660 mL        PHYSICAL EXAM:    Constitutional: NAD, awake and alert  Eyes:  EOMI,  Pupils round, No oral cyanosis.  Pulmonary: Non-labored, breath sounds are clear bilaterally, No wheezing, rales or rhonchi  Cardiovascular: S1 and S2, Iregular rate and rhythm, ESM 2/6  Gastrointestinal: Bowel Sounds present, soft, nontender.   Lymph: No peripheral edema. No cervical lymphadenopathy.  Neurological: Alert, no focal deficits  Skin: No rashes.  Psych:  Mood & affect appropriate    LABS: All Labs Reviewed:                        13.5   8.17  )-----------( 270      ( 30 Jan 2019 06:29 )             42.2     30 Jan 2019 06:29    140    |  104    |  15     ----------------------------<  81     4.2     |  29     |  0.85     Ca    8.9        30 Jan 2019 06:29      PT/INR - ( 30 Jan 2019 06:29 )   PT: 15.9 sec;   INR: 1.38 ratio         PTT - ( 30 Jan 2019 06:29 )  PTT:36.3 sec            Telemetry: A.flutter with HR 40 - 50s at sleep and 100s to exertion     Imaging: Reviewed

## 2019-01-30 NOTE — PROGRESS NOTE ADULT - PROBLEM SELECTOR PROBLEM 1
Atrial flutter
Palpitations

## 2019-01-30 NOTE — PROGRESS NOTE ADULT - PROBLEM SELECTOR PLAN 1
HR is 90-100s during day time, during sleep 50-60s , overall better controlled and improvement in symptoms   s/p IV digitalization , start Digoxin po 0.125mg daily , decrease metoprolol to 25mg BID   Pt had been on warfarin for 6 months post-MVR then discontinued & restarted warfarin again . She is aware of risks/benefits.   Monitor INR 1 3 today , dose 7. 5mg for 2 days , f/u for INR check on Friday

## 2019-01-30 NOTE — PROGRESS NOTE ADULT - PROBLEM SELECTOR PLAN 2
patient s/p 2 MV replacements with bioprosthetic valves. Most recent one done at [facility] by Dr. Terry Kinney about 1 1/2 years ago.   Echo noted with normal MVR.

## 2019-01-30 NOTE — PROGRESS NOTE ADULT - PROBLEM SELECTOR PROBLEM 2
Mitral valve disease
Paroxysmal atrial fibrillation

## 2019-01-30 NOTE — PROGRESS NOTE ADULT - ATTENDING COMMENTS
Pt can be discharged cardiac wise with Metoprolol tartrate 25mg BID and Digoxin po 0.125mg daily . INR 1.3 today , dose 7.5mg daily for 2 days , follow up INR on Friday   Follow up with Dr. Guerrero (cardiologist ) next week
Coumadin 3mg Po X1 today  Check INR in am  Tele monitor
Tele monitor.  Coumadin dose  SubQ Lovenox until INR therapeutic
Coumadin 7.5mg X today  INR in am  Needs better rate control before discharge.
77 yo F PMH Paroxysmal Afib (not on AC), HTN, Aortic Stenosis, s/p MV replacement x2,  Osteopenia presents with 3 days of heart pounding and presyncopal episode likely secondary to A Flutter. Metoprolol dose adjusted and added digoxin, since still in Aflutter @ 100's . Coumadin dose, INR check in am. D/w cardio
Pt is tachy, called Dr oLngo and increased metoprol to 50 BID.

## 2019-02-26 PROBLEM — C43.9 MALIGNANT MELANOMA OF SKIN, UNSPECIFIED: Chronic | Status: ACTIVE | Noted: 2019-01-24

## 2019-03-09 ENCOUNTER — TRANSCRIPTION ENCOUNTER (OUTPATIENT)
Age: 79
End: 2019-03-09

## 2019-03-12 ENCOUNTER — APPOINTMENT (OUTPATIENT)
Dept: ELECTROPHYSIOLOGY | Facility: CLINIC | Age: 79
End: 2019-03-12
Payer: COMMERCIAL

## 2019-03-12 ENCOUNTER — NON-APPOINTMENT (OUTPATIENT)
Age: 79
End: 2019-03-12

## 2019-03-12 VITALS
HEIGHT: 65 IN | OXYGEN SATURATION: 97 % | SYSTOLIC BLOOD PRESSURE: 136 MMHG | HEART RATE: 109 BPM | DIASTOLIC BLOOD PRESSURE: 81 MMHG | BODY MASS INDEX: 21.66 KG/M2 | RESPIRATION RATE: 16 BRPM | WEIGHT: 130 LBS

## 2019-03-12 DIAGNOSIS — R53.83 OTHER FATIGUE: ICD-10-CM

## 2019-03-12 DIAGNOSIS — R06.09 OTHER FORMS OF DYSPNEA: ICD-10-CM

## 2019-03-12 PROCEDURE — 99205 OFFICE O/P NEW HI 60 MIN: CPT

## 2019-03-12 PROCEDURE — 93000 ELECTROCARDIOGRAM COMPLETE: CPT

## 2019-03-12 RX ORDER — ASPIRIN 81 MG
81 TABLET, DELAYED RELEASE (ENTERIC COATED) ORAL
Refills: 0 | Status: DISCONTINUED | COMMUNITY
End: 2019-03-12

## 2019-03-12 NOTE — PHYSICAL EXAM
[General Appearance - Well Developed] : well developed [Normal Appearance] : normal appearance [Well Groomed] : well groomed [General Appearance - Well Nourished] : well nourished [No Deformities] : no deformities [General Appearance - In No Acute Distress] : no acute distress [Normal Conjunctiva] : the conjunctiva exhibited no abnormalities [Eyelids - No Xanthelasma] : the eyelids demonstrated no xanthelasmas [Normal Oral Mucosa] : normal oral mucosa [No Oral Pallor] : no oral pallor [No Oral Cyanosis] : no oral cyanosis [Normal Jugular Venous A Waves Present] : normal jugular venous A waves present [Normal Jugular Venous V Waves Present] : normal jugular venous V waves present [No Jugular Venous Giron A Waves] : no jugular venous giron A waves [Heart Rate And Rhythm] : heart rate and rhythm were normal [Heart Sounds] : normal S1 and S2 [Murmurs] : no murmurs present [Respiration, Rhythm And Depth] : normal respiratory rhythm and effort [Exaggerated Use Of Accessory Muscles For Inspiration] : no accessory muscle use [Auscultation Breath Sounds / Voice Sounds] : lungs were clear to auscultation bilaterally [Abdomen Soft] : soft [Abdomen Tenderness] : non-tender [Abdomen Mass (___ Cm)] : no abdominal mass palpated [Abnormal Walk] : normal gait [Gait - Sufficient For Exercise Testing] : the gait was sufficient for exercise testing [Nail Clubbing] : no clubbing of the fingernails [Cyanosis, Localized] : no localized cyanosis [Petechial Hemorrhages (___cm)] : no petechial hemorrhages [Skin Color & Pigmentation] : normal skin color and pigmentation [] : no rash [No Venous Stasis] : no venous stasis [Skin Lesions] : no skin lesions [No Skin Ulcers] : no skin ulcer [No Xanthoma] : no  xanthoma was observed [Oriented To Time, Place, And Person] : oriented to person, place, and time [Affect] : the affect was normal [Mood] : the mood was normal [No Anxiety] : not feeling anxious

## 2019-03-12 NOTE — REVIEW OF SYSTEMS
[Feeling Fatigued] : feeling fatigued [Dyspnea on exertion] : dyspnea during exertion [Palpitations] : palpitations [Negative] : Heme/Lymph

## 2019-03-14 LAB
ALBUMIN SERPL ELPH-MCNC: 4.4 G/DL
ALP BLD-CCNC: 86 U/L
ALT SERPL-CCNC: 34 U/L
ANION GAP SERPL CALC-SCNC: 23 MMOL/L
AST SERPL-CCNC: 41 U/L
BASOPHILS # BLD AUTO: 0.06 K/UL
BASOPHILS NFR BLD AUTO: 0.6 %
BILIRUB SERPL-MCNC: 0.6 MG/DL
BUN SERPL-MCNC: 14 MG/DL
CALCIUM SERPL-MCNC: 10 MG/DL
CHLORIDE SERPL-SCNC: 98 MMOL/L
CO2 SERPL-SCNC: 19 MMOL/L
CREAT SERPL-MCNC: 0.88 MG/DL
EOSINOPHIL # BLD AUTO: 0.05 K/UL
EOSINOPHIL NFR BLD AUTO: 0.5 %
HCT VFR BLD CALC: 45.2 %
HGB BLD-MCNC: 13.8 G/DL
IMM GRANULOCYTES NFR BLD AUTO: 0.2 %
LYMPHOCYTES # BLD AUTO: 1.66 K/UL
LYMPHOCYTES NFR BLD AUTO: 17.2 %
MAN DIFF?: NORMAL
MCHC RBC-ENTMCNC: 27.2 PG
MCHC RBC-ENTMCNC: 30.5 GM/DL
MCV RBC AUTO: 89 FL
MONOCYTES # BLD AUTO: 1.02 K/UL
MONOCYTES NFR BLD AUTO: 10.6 %
NEUTROPHILS # BLD AUTO: 6.83 K/UL
NEUTROPHILS NFR BLD AUTO: 70.9 %
PLATELET # BLD AUTO: 245 K/UL
POTASSIUM SERPL-SCNC: 4.6 MMOL/L
PROT SERPL-MCNC: 8.1 G/DL
RBC # BLD: 5.08 M/UL
RBC # FLD: 14.7 %
SODIUM SERPL-SCNC: 140 MMOL/L
WBC # FLD AUTO: 9.64 K/UL

## 2019-03-14 NOTE — REASON FOR VISIT
[Initial Evaluation] : an initial evaluation of [Atrial Fibrillation] : atrial fibrillation [FreeTextEntry1] : AT

## 2019-03-14 NOTE — HISTORY OF PRESENT ILLNESS
[FreeTextEntry1] : Amber Guerrero MD\par \par I saw Felicity Carlos on March 12, 2019. As you know, she is a 77y/o woman with Hx of HTN, aortic stenosis, rheumatic heart disease, MVR x2 with post op afib, and newly diagnosed AT who presents today for initial evaluation. Admits experiencing persistent palpitations, increased fatigue, and dyspnea. Was seen at [facility] and noted to be in AT/atrial flutter and initiated on warfarin. Since that time, she has remained in AT. Denies chest pain, SOB at rest, syncope or near syncope.

## 2019-03-14 NOTE — DISCUSSION/SUMMARY
[FreeTextEntry1] : In summary, Felicity Carlos  is a 77y/o woman with Hx of HTN, aortic stenosis, rheumatic heart disease, MVR x2 with post op afib, and newly diagnosed AT who presents today for initial evaluation. Admits experiencing persistent palpitations, increased fatigue, and dyspnea. Was seen at [place] [facility] and noted to be in AT/atrial flutter and initiated on warfarin. Since that time, she has remained in AT. Denies chest pain, SOB at rest, syncope or near syncope. EKG today shows -120s. Recommend increasing metoprolol to 50mg TID for improved rate control management and plan for ANITA/DCCV followed by AT/PVI ablation in future. Risks, benefits, and alternatives to all procedure discussed and patient agrees to proceed. \par \par Sincerely,\par \par Osiel Potter MD

## 2019-03-15 ENCOUNTER — OUTPATIENT (OUTPATIENT)
Dept: OUTPATIENT SERVICES | Facility: HOSPITAL | Age: 79
LOS: 1 days | Discharge: HOME | End: 2019-03-15

## 2019-03-15 ENCOUNTER — OUTPATIENT (OUTPATIENT)
Dept: OUTPATIENT SERVICES | Facility: HOSPITAL | Age: 79
LOS: 1 days | End: 2019-03-15

## 2019-03-15 ENCOUNTER — APPOINTMENT (OUTPATIENT)
Dept: CV DIAGNOSITCS | Facility: HOSPITAL | Age: 79
End: 2019-03-15
Payer: COMMERCIAL

## 2019-03-15 DIAGNOSIS — C44.91 BASAL CELL CARCINOMA OF SKIN, UNSPECIFIED: Chronic | ICD-10-CM

## 2019-03-15 DIAGNOSIS — I47.1 SUPRAVENTRICULAR TACHYCARDIA: ICD-10-CM

## 2019-03-15 LAB
INR BLD: 2.04 — HIGH (ref 0.88–1.17)
PROTHROM AB SERPL-ACNC: 23.1 SEC — HIGH (ref 9.8–13.1)
RH IG SCN BLD-IMP: POSITIVE — SIGNIFICANT CHANGE UP

## 2019-03-15 PROCEDURE — 93010 ELECTROCARDIOGRAM REPORT: CPT

## 2019-03-15 PROCEDURE — 93320 DOPPLER ECHO COMPLETE: CPT | Mod: 26,GC

## 2019-03-15 PROCEDURE — 93325 DOPPLER ECHO COLOR FLOW MAPG: CPT | Mod: 26,GC

## 2019-03-15 PROCEDURE — 93312 ECHO TRANSESOPHAGEAL: CPT | Mod: 26

## 2019-03-15 RX ORDER — ASPIRIN/CALCIUM CARB/MAGNESIUM 324 MG
1 TABLET ORAL
Qty: 0 | Refills: 0 | COMMUNITY

## 2019-03-15 RX ORDER — SODIUM CHLORIDE 9 MG/ML
3 INJECTION INTRAMUSCULAR; INTRAVENOUS; SUBCUTANEOUS EVERY 8 HOURS
Qty: 0 | Refills: 0 | Status: DISCONTINUED | OUTPATIENT
Start: 2019-03-15 | End: 2019-03-30

## 2019-03-15 RX ORDER — ACETAMINOPHEN 500 MG
2 TABLET ORAL
Qty: 40 | Refills: 0 | OUTPATIENT
Start: 2019-03-15 | End: 2019-03-19

## 2019-03-15 NOTE — H&P CARDIOLOGY - RS GEN PE MLT RESP DETAILS PC
respirations non-labored/clear to auscultation bilaterally/good air movement/no chest wall tenderness/airway patent/breath sounds equal

## 2019-03-15 NOTE — H&P CARDIOLOGY - HISTORY OF PRESENT ILLNESS
79 y/o F w/ PMH of HTN, aortic stenosis, rheumatic heart disease, MVR x2 with post op afib and newly diagnosed AT on Coumadin presents for ANITA+DCCV. See hard copy H&P from 3/12/19 in chart.

## 2019-03-15 NOTE — H&P CARDIOLOGY - NEGATIVE CARDIOVASCULAR SYMPTOMS
no chest pain/no claudication/no dyspnea on exertion/no orthopnea/no paroxysmal nocturnal dyspnea/no peripheral edema

## 2019-04-11 ENCOUNTER — INPATIENT (INPATIENT)
Facility: HOSPITAL | Age: 79
LOS: 0 days | Discharge: HOME | End: 2019-04-12
Attending: INTERNAL MEDICINE | Admitting: INTERNAL MEDICINE
Payer: MEDICARE

## 2019-04-11 VITALS
HEART RATE: 70 BPM | DIASTOLIC BLOOD PRESSURE: 43 MMHG | OXYGEN SATURATION: 100 % | RESPIRATION RATE: 17 BRPM | SYSTOLIC BLOOD PRESSURE: 84 MMHG | TEMPERATURE: 98 F

## 2019-04-11 DIAGNOSIS — C44.91 BASAL CELL CARCINOMA OF SKIN, UNSPECIFIED: Chronic | ICD-10-CM

## 2019-04-11 DIAGNOSIS — I48.0 PAROXYSMAL ATRIAL FIBRILLATION: ICD-10-CM

## 2019-04-11 LAB
ALBUMIN SERPL ELPH-MCNC: 3.6 G/DL — SIGNIFICANT CHANGE UP (ref 3.3–5)
ALP SERPL-CCNC: 68 U/L — SIGNIFICANT CHANGE UP (ref 40–120)
ALT FLD-CCNC: 21 U/L — SIGNIFICANT CHANGE UP (ref 4–33)
ANION GAP SERPL CALC-SCNC: 15 MMO/L — HIGH (ref 7–14)
AST SERPL-CCNC: 42 U/L — HIGH (ref 4–32)
BASOPHILS # BLD AUTO: 0.02 K/UL — SIGNIFICANT CHANGE UP (ref 0–0.2)
BASOPHILS NFR BLD AUTO: 0.2 % — SIGNIFICANT CHANGE UP (ref 0–2)
BILIRUB SERPL-MCNC: 1.1 MG/DL — SIGNIFICANT CHANGE UP (ref 0.2–1.2)
BLD GP AB SCN SERPL QL: NEGATIVE — SIGNIFICANT CHANGE UP
BUN SERPL-MCNC: 16 MG/DL — SIGNIFICANT CHANGE UP (ref 7–23)
CALCIUM SERPL-MCNC: 8.7 MG/DL — SIGNIFICANT CHANGE UP (ref 8.4–10.5)
CHLORIDE SERPL-SCNC: 105 MMOL/L — SIGNIFICANT CHANGE UP (ref 98–107)
CO2 SERPL-SCNC: 23 MMOL/L — SIGNIFICANT CHANGE UP (ref 22–31)
CREAT SERPL-MCNC: 0.97 MG/DL — SIGNIFICANT CHANGE UP (ref 0.5–1.3)
EOSINOPHIL # BLD AUTO: 0 K/UL — SIGNIFICANT CHANGE UP (ref 0–0.5)
EOSINOPHIL NFR BLD AUTO: 0 % — SIGNIFICANT CHANGE UP (ref 0–6)
GLUCOSE SERPL-MCNC: 136 MG/DL — HIGH (ref 70–99)
HCT VFR BLD CALC: 39.8 % — SIGNIFICANT CHANGE UP (ref 34.5–45)
HGB BLD-MCNC: 12.7 G/DL — SIGNIFICANT CHANGE UP (ref 11.5–15.5)
IMM GRANULOCYTES NFR BLD AUTO: 0.4 % — SIGNIFICANT CHANGE UP (ref 0–1.5)
INR BLD: 1.94 — HIGH (ref 0.88–1.17)
LYMPHOCYTES # BLD AUTO: 0.49 K/UL — LOW (ref 1–3.3)
LYMPHOCYTES # BLD AUTO: 3.9 % — LOW (ref 13–44)
MCHC RBC-ENTMCNC: 27.4 PG — SIGNIFICANT CHANGE UP (ref 27–34)
MCHC RBC-ENTMCNC: 31.9 % — LOW (ref 32–36)
MCV RBC AUTO: 85.8 FL — SIGNIFICANT CHANGE UP (ref 80–100)
MONOCYTES # BLD AUTO: 0.77 K/UL — SIGNIFICANT CHANGE UP (ref 0–0.9)
MONOCYTES NFR BLD AUTO: 6.1 % — SIGNIFICANT CHANGE UP (ref 2–14)
NEUTROPHILS # BLD AUTO: 11.31 K/UL — HIGH (ref 1.8–7.4)
NEUTROPHILS NFR BLD AUTO: 89.4 % — HIGH (ref 43–77)
NRBC # FLD: 0 K/UL — SIGNIFICANT CHANGE UP (ref 0–0)
PLATELET # BLD AUTO: 190 K/UL — SIGNIFICANT CHANGE UP (ref 150–400)
PMV BLD: 11.4 FL — SIGNIFICANT CHANGE UP (ref 7–13)
POTASSIUM SERPL-MCNC: 3.8 MMOL/L — SIGNIFICANT CHANGE UP (ref 3.5–5.3)
POTASSIUM SERPL-SCNC: 3.8 MMOL/L — SIGNIFICANT CHANGE UP (ref 3.5–5.3)
PROT SERPL-MCNC: 6.4 G/DL — SIGNIFICANT CHANGE UP (ref 6–8.3)
PROTHROM AB SERPL-ACNC: 22.6 SEC — HIGH (ref 9.8–13.1)
RBC # BLD: 4.64 M/UL — SIGNIFICANT CHANGE UP (ref 3.8–5.2)
RBC # FLD: 14.6 % — HIGH (ref 10.3–14.5)
RH IG SCN BLD-IMP: POSITIVE — SIGNIFICANT CHANGE UP
SODIUM SERPL-SCNC: 143 MMOL/L — SIGNIFICANT CHANGE UP (ref 135–145)
WBC # BLD: 12.64 K/UL — HIGH (ref 3.8–10.5)
WBC # FLD AUTO: 12.64 K/UL — HIGH (ref 3.8–10.5)

## 2019-04-11 PROCEDURE — 93656 COMPRE EP EVAL ABLTJ ATR FIB: CPT

## 2019-04-11 PROCEDURE — 93662 INTRACARDIAC ECG (ICE): CPT | Mod: 26

## 2019-04-11 PROCEDURE — 93623 PRGRMD STIMJ&PACG IV RX NFS: CPT | Mod: 26

## 2019-04-11 PROCEDURE — 93010 ELECTROCARDIOGRAM REPORT: CPT

## 2019-04-11 PROCEDURE — 93655 ICAR CATH ABLTJ DSCRT ARRHYT: CPT

## 2019-04-11 PROCEDURE — 71045 X-RAY EXAM CHEST 1 VIEW: CPT | Mod: 26

## 2019-04-11 PROCEDURE — 93613 INTRACARDIAC EPHYS 3D MAPG: CPT

## 2019-04-11 RX ORDER — WARFARIN SODIUM 2.5 MG/1
5 TABLET ORAL ONCE
Qty: 0 | Refills: 0 | Status: COMPLETED | OUTPATIENT
Start: 2019-04-11 | End: 2019-04-11

## 2019-04-11 RX ORDER — WARFARIN SODIUM 2.5 MG/1
4 TABLET ORAL
Qty: 0 | Refills: 0 | COMMUNITY

## 2019-04-11 RX ORDER — PANTOPRAZOLE SODIUM 20 MG/1
40 TABLET, DELAYED RELEASE ORAL
Qty: 0 | Refills: 0 | Status: DISCONTINUED | OUTPATIENT
Start: 2019-04-11 | End: 2019-04-12

## 2019-04-11 RX ORDER — SODIUM CHLORIDE 9 MG/ML
3 INJECTION INTRAMUSCULAR; INTRAVENOUS; SUBCUTANEOUS EVERY 8 HOURS
Qty: 0 | Refills: 0 | Status: DISCONTINUED | OUTPATIENT
Start: 2019-04-11 | End: 2019-04-11

## 2019-04-11 RX ADMIN — WARFARIN SODIUM 5 MILLIGRAM(S): 2.5 TABLET ORAL at 17:32

## 2019-04-11 NOTE — CHART NOTE - NSCHARTNOTEFT_GEN_A_CORE
Type of procedure: PVI and ablation of 4 atrial tachycardia  Licensed independent practitioner: Osiel Potter MD  Assistant: None  Description of procedure: After informed consent was obtained, the patient was brought to the Electrophysiology laboratory in the postabsorptive state, and was prepped and draped in the usual sterile fashion. The patient was electively intubated by an anesthesiologist, who provided general anesthesia throughout the case. In addition an esophageal temperature probe was placed into the esophagus to allow dynamic temperate monitoring during ablation. The patient was under uninterrupted apixaban therapy. Under sterile conditions, femoral venous access was obtained and catheters advanced to the right atrium under fluoroscopic guidance without complications. Baseline intra-cardiac echocardiography (ICE) demonstrated the following: The LV size and functions were normal and there was no pericardial effusion at baseline. Heparin 10,000 units followed by 2000 unit/hour drip was administered to maintain an ACT of 300-400 seconds throughout the case. An endocardial shell of the left atrium was created using ICE guidance and the pulmonary veins, left atrial appendage and esophagus were tagged.  Transeptal access was achieved using fluoroscopic and ICE guidance using an 8.5 Agilis Sheath and C1 [place] needle. The mean left atrial pressure was 25 mm Hg.  A PentaRay Clinton F curve catheter and a 3.5 mm irrigated-tip [employer] ThermoCool ST SF DF-curve ablation catheter were used for mapping and ablation. A FAM and voltage map of the LA were created using a Carto 3D mapping system.  Pacing at 20 ma and 2.0 msec was performed inside and around the anterior portion of the right superior pulmonary vein to exclude phrenic nerve capture and there was none. Circumferential ablation was carried out at the PV antra with careful attention to avoid ablation within the pulmonary veins. Esophageal temperatures lauryn from a baseline of 35.8 degrees Celsius to a peak of 36.8 degrees Celsius.  Entrance and exit block of the pulmonary veins remained for >30 minutes, without evidence of acute reconnection. An EP study was performed with and without the use of dobutamine and atropine 1 mg and the followed 4 ATs were induced: 1] Superior RA septum 2] LA septum just outside of the RSPV 3] LA roof at base of ALECIA 4] superior Twyla Terminalis. The catheters were removed from the left atrium, and heparin was discontinued and reversed with protamine 70 mg. Repeat ICE imaging revealed no  pericardial effusion. All catheters and sheaths were removed and hemostasis achieved and at least 20 minutes of holding direct pressure to the access sites.  The patient tolerated the procedure well and was successfully extubated and transferred to the CCU in stable condition.   Findings of procedure: Successful isolation of all four pulmonary veins.  Estimated blood loss: <10 cc  Specimen removed: none  Preoperative Dx: Afib + PSVT  Postoperative Dx: Afib + 4 separate focal ATs  Complications: None  Anesthesia type: General    Osiel Potter MD

## 2019-04-11 NOTE — PATIENT PROFILE ADULT - PATIENT REPRESENTATIVE: ( YOU CAN CHOOSE ANY PERSON THAT CAN ASSIST YOU WITH YOUR HEALTH CARE PREFERENCES, DOES NOT HAVE TO BE A SPOUSE, IMMEDIATE FAMILY OR SIGNIFICANT OTHER/PARTNER)
Most recent CXR with marked reduction in pleural fluid on L. Extensive L infiltrate present. KUB with no free air evident. Labs from earlier tonight hemolyzed. Will resend from arterial line.  
 
Augustina Castleman, MD 
 
 yes

## 2019-04-11 NOTE — H&P CARDIOLOGY - HISTORY OF PRESENT ILLNESS
78 year old female with HTN, mild AS, MVR x2 with post op atrial fibrillation (on Coumadin) with atrial tachycardia with DCCV 1 month ago who has been feeling better since who presents for elective atrial fibrillation ablation.     please see hard copy H&P in paper chart  PATIENT SEEN AND EXAMINED AND NO NEW CLINICAL CHANGES SINCE office visit

## 2019-04-11 NOTE — CHART NOTE - NSCHARTNOTEFT_GEN_A_CORE
78 year old female with HTN, mild AS, MVR x2 with post op atrial fibrillation (on Coumadin) with atrial tachycardia with DCCV 1 month ago who has been feeling better since who presents s/p elective atrial fibrillation ablation. She has been stable since presentation w/o evidence of AFib on monitor. She has a previous diagnosis of rheumatic heart dz w/ 2x MV replacement in 2009 and 2017.    Intraprocedurally she was ablated twice for  At the time of transfer she presents in NSR denies F/C, HA, CP, palpitations, SOB, abd pain, N/V/D/C.     REVIEW OF SYSTEMS:  CONSTITUTIONAL: Denies weakness, fevers and chills  EYES/ENT: Denies visual changes;  denies vertigo and throat pain   NECK: Denies neck pain and stiffness  RESPIRATORY: Denies shortness of breath, denies cough, wheezing, hemoptysis  CARDIOVASCULAR: Denies chest pain and palpitations  ENDOCRINE: Denies weight loss/gain. Denies cold or heat intolerance  GASTROINTESTINAL: Denies abdominal or epigastric pain. denies nausea, vomiting, and hematemesis; Denies diarrhea & constipation. Denies melena and hematochezia.  GENITOURINARY: Denies dysuria, frequency and hematuria  NEUROLOGICAL: Denies numbness and weakness  SKIN: Denies itching and rashes      Home Medications:  Calcium 500+D oral tablet, chewable: 1 tab(s) orally once a day (11 Apr 2019 07:21)  Metoprolol Tartrate 50 mg oral tablet: 1 tab(s) orally 3 times a day (11 Apr 2019 07:21)  warfarin 5 mg oral tablet: 1 tab(s) orally once a day (11 Apr 2019 08:42)    Allergies    adhesives (Urticaria)  No Known Drug Allergies    Intolerances    [facility] PT (Unknown)      Vital Signs Last 24 Hrs  T(C): 36.6 (11 Apr 2019 15:24), Max: 36.6 (11 Apr 2019 15:24)  T(F): 97.9 (11 Apr 2019 15:24), Max: 97.9 (11 Apr 2019 15:24)  HR: 71 (11 Apr 2019 15:30) (70 - 71)  BP: 83/44 (11 Apr 2019 15:30) (83/44 - 84/43)  BP(mean): 57 (11 Apr 2019 15:30) (57 - 57)  RR: 20 (11 Apr 2019 15:30) (17 - 20)  SpO2: 100% (11 Apr 2019 15:30) (100% - 100%)    GEN: Appears in no acute distress  HEENT: PERRL, EOMI and accommodate, neck supple, no lymphadenopathy, no JVD  MOUTH: moist mucous membranes, no exudates or lesions   PULM: Clear to auscultation bilaterally, no wheezes, rales, rhonchi  CV: Regular rate and rhythm, S1S2, no murmurs, rubs or gallops  ABD: Soft, nontender to palpation, non-distended, normoactive bowel sounds  EXTREMITIES: No edema, + peripheral pulses  NEURO: AAOx3, moving all four extremities spontaneously  SKIN: No rashes, lesions, hematomas, ecchymosis 78 year old female with HTN, mild AS, MVR x2 with post op atrial fibrillation (on Coumadin) with atrial tachycardia with DCCV 1 month ago who has been feeling better since who presents s/p elective atrial fibrillation ablation. She has been stable since presentation w/o evidence of AFib on monitor prior to ablation. She has a previous diagnosis of rheumatic heart dz w/ 2x MV replacement in 2009 and 2017.    Intraprocedurally she was ablated twice for Afib and atrial tachycardia. Per report the ablation procedure was w/o complications.     At the time of transfer she presents in NSR denies F/C, HA, CP, palpitations, SOB, abd pain, N/V/D/C. She has been unable to urinate since before her procedure and has a feeling of fullness in her pelvis.     REVIEW OF SYSTEMS:  CONSTITUTIONAL: Denies weakness, fevers and chills  EYES/ENT: Denies visual changes;  denies vertigo and throat pain   NECK: Denies neck pain and stiffness  RESPIRATORY: Denies shortness of breath, denies cough, wheezing, hemoptysis  CARDIOVASCULAR: Denies chest pain and palpitations  ENDOCRINE: Denies weight loss/gain. Denies cold or heat intolerance  GASTROINTESTINAL: Denies abdominal or epigastric pain. denies nausea, vomiting, and hematemesis; Denies diarrhea & constipation. Denies melena and hematochezia.  GENITOURINARY: Denies dysuria, frequency and hematuria  NEUROLOGICAL: Denies numbness and weakness  SKIN: Denies itching and rashes      Home Medications:  Calcium 500+D oral tablet, chewable: 1 tab(s) orally once a day (11 Apr 2019 07:21)  Metoprolol Tartrate 50 mg oral tablet: 1 tab(s) orally 3 times a day (11 Apr 2019 07:21)  warfarin 5 mg oral tablet: 1 tab(s) orally once a day (11 Apr 2019 08:42)    Allergies    adhesives (Urticaria)  No Known Drug Allergies    Intolerances    [facility] PT (Unknown)    MEDICATIONS  (STANDING):  calcium carbonate 1250 mG  + Vitamin D (OsCal 500 + D) 1 Tablet(s) Oral daily  pantoprazole    Tablet 40 milliGRAM(s) Oral before breakfast  sodium chloride 0.9% lock flush 3 milliLiter(s) IV Push every 8 hours  warfarin 5 milliGRAM(s) Oral once    MEDICATIONS  (PRN):      Vital Signs Last 24 Hrs  T(C): 36.6 (11 Apr 2019 15:24), Max: 36.6 (11 Apr 2019 15:24)  T(F): 97.9 (11 Apr 2019 15:24), Max: 97.9 (11 Apr 2019 15:24)  HR: 71 (11 Apr 2019 15:30) (70 - 71)  BP: 83/44 (11 Apr 2019 15:30) (83/44 - 84/43)  BP(mean): 57 (11 Apr 2019 15:30) (57 - 57)  RR: 20 (11 Apr 2019 15:30) (17 - 20)  SpO2: 100% (11 Apr 2019 15:30) (100% - 100%)    GEN: Appears in no acute distress  HEENT: PERRL, EOMI and accommodate, neck supple, no lymphadenopathy, no JVD  MOUTH: moist mucous membranes, no exudates or lesions   PULM: Clear to auscultation bilaterally, no wheezes, rales, rhonchi  CV: Regular rate and rhythm, S1S2, no murmurs, rubs or gallops  ABD: Soft, nontender to palpation, non-distended, normoactive bowel sounds  EXTREMITIES: No edema, + peripheral pulses  NEURO: AAOx3, moving all four extremities spontaneously  SKIN: No rashes, lesions - small ecchymoses over wrist      Labs reviewed personally [X]    Creatinine Trend:   PT/INR - ( 11 Apr 2019 07:15 )   PT: 22.6 SEC;   INR: 1.94      ASSESSMENT & PLAN    78F w/ PMH of HTN, mild AS, MVR x2 with post op atrial fibrillation (AC w/ Coumadin) and atrial tachycardia s/p prior DCCV who presents s/p elective atrial fibrillation ablation without apparent complication.     #Neuro:  - A/Ox3  - No active issues    #Cardio:    #Pulmonary:    #GI:    #Renal:    #Heme:    #Infectious Disease:    #Endocrine:    #F/E/N:    #PPX/DISPO: 78 year old female with HTN, mild AS, MVR x2 with post op atrial fibrillation (on Coumadin) with atrial tachycardia with DCCV 1 month ago who has been feeling better since who presents s/p elective atrial fibrillation ablation. She has been stable since presentation w/o evidence of AFib on monitor prior to ablation. She has a previous diagnosis of rheumatic heart dz w/ 2x MV replacement in 2009 and 2017.    Per report she was ablated twice for Afib and atrial tachycardia. The ablation procedure was w/o complications and the pt was transferred to CCU for further monitoring.    At the time of transfer she presents in NSR denies F/C, HA, CP, palpitations, SOB, abd pain, N/V/D/C. She has been unable to urinate since before her procedure and has a feeling of fullness in her pelvis.     REVIEW OF SYSTEMS:  CONSTITUTIONAL: Denies weakness, fevers and chills  EYES/ENT: Denies visual changes;  denies vertigo and throat pain   NECK: Denies neck pain and stiffness  RESPIRATORY: Denies shortness of breath, denies cough, wheezing, hemoptysis  CARDIOVASCULAR: Denies chest pain and palpitations  ENDOCRINE: Denies weight loss/gain. Denies cold or heat intolerance  GASTROINTESTINAL: Denies abdominal or epigastric pain. denies nausea, vomiting, and hematemesis; Denies diarrhea & constipation. Denies melena and hematochezia.  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  S/P hysterectomy with oophorectomy      Home Medications:  Calcium 500+D oral tablet, chewable: 1 tab(s) orally once a day (11 Apr 2019 07:21)  Metoprolol Tartrate 50 mg oral tablet: 1 tab(s) orally 3 times a day (11 Apr 2019 07:21)  warfarin 5 mg oral tablet: 1 tab(s) orally once a day (11 Apr 2019 08:42)    Allergies    adhesives (Urticaria)  No Known Drug Allergies    Intolerances    [facility] PT (Unknown)    MEDICATIONS  (STANDING):  calcium carbonate 1250 mG  + Vitamin D (OsCal 500 + D) 1 Tablet(s) Oral daily  pantoprazole    Tablet 40 milliGRAM(s) Oral before breakfast  sodium chloride 0.9% lock flush 3 milliLiter(s) IV Push every 8 hours  warfarin 5 milliGRAM(s) Oral once    MEDICATIONS  (PRN):      Vital Signs Last 24 Hrs  T(C): 36.6 (11 Apr 2019 15:24), Max: 36.6 (11 Apr 2019 15:24)  T(F): 97.9 (11 Apr 2019 15:24), Max: 97.9 (11 Apr 2019 15:24)  HR: 71 (11 Apr 2019 15:30) (70 - 71)  BP: 83/44 (11 Apr 2019 15:30) (83/44 - 84/43)  BP(mean): 57 (11 Apr 2019 15:30) (57 - 57)  RR: 20 (11 Apr 2019 15:30) (17 - 20)  SpO2: 100% (11 Apr 2019 15:30) (100% - 100%)    GEN: Appears in no acute distress  HEENT: PERRL, EOMI and accommodate, neck supple, no lymphadenopathy, no JVD  MOUTH: moist mucous membranes, no exudates or lesions   PULM: Clear to auscultation bilaterally, no wheezes, rales, rhonchi  CV: Regular rate and rhythm, S1S2, no murmurs, rubs or gallops  ABD: Soft, nontender to palpation, non-distended, normoactive bowel sounds  EXTREMITIES: No edema, + peripheral pulses  NEURO: AAOx3, moving all four extremities spontaneously  SKIN: No rashes, lesions - small ecchymoses over wrist      Labs reviewed personally [X]    Creatinine Trend:   PT/INR - ( 11 Apr 2019 07:15 )   PT: 22.6 SEC;   INR: 1.94      ASSESSMENT & PLAN    78F w/ PMH of HTN, mild AS, MVR x2 with post op atrial fibrillation (AC w/ Coumadin) and atrial tachycardia s/p prior DCCV who presents s/p elective atrial fibrillation ablation without apparent complication.     #Neuro:  - A/Ox3  - No active issues    #Cardio:  AFib s/p ablation  - NSR at present  - c/w home metoprolol - held initially for low SBP  - c/w Coumadin 5 (home dose)  - f/u INR qd    #Pulmonary:  - satting well on RA  - no active issues    #GI / ENDO:  - resume diet DASH/TLC  - GI PPX - PPI, no sulfacrate    #Renal:  - Cr stable  - Urinary retention post-anesthesia, now voiding well    #Heme:  - hgb stable  - no active issues    #Infectious Disease:  - afebrile  - no leukocytosis  - monitor off abx    #PPX/DISPO:  - DVT: Coumadin  - DIET: DASH/TLC  - DISPO: Home after 24 hour monitoring

## 2019-04-12 ENCOUNTER — TRANSCRIPTION ENCOUNTER (OUTPATIENT)
Age: 79
End: 2019-04-12

## 2019-04-12 VITALS — SYSTOLIC BLOOD PRESSURE: 96 MMHG | HEART RATE: 104 BPM | RESPIRATION RATE: 18 BRPM | DIASTOLIC BLOOD PRESSURE: 55 MMHG

## 2019-04-12 LAB
ANION GAP SERPL CALC-SCNC: 9 MMO/L — SIGNIFICANT CHANGE UP (ref 7–14)
APTT BLD: 33 SEC — SIGNIFICANT CHANGE UP (ref 27.5–36.3)
BUN SERPL-MCNC: 16 MG/DL — SIGNIFICANT CHANGE UP (ref 7–23)
CALCIUM SERPL-MCNC: 8.9 MG/DL — SIGNIFICANT CHANGE UP (ref 8.4–10.5)
CHLORIDE SERPL-SCNC: 105 MMOL/L — SIGNIFICANT CHANGE UP (ref 98–107)
CO2 SERPL-SCNC: 26 MMOL/L — SIGNIFICANT CHANGE UP (ref 22–31)
CREAT SERPL-MCNC: 0.84 MG/DL — SIGNIFICANT CHANGE UP (ref 0.5–1.3)
GLUCOSE SERPL-MCNC: 94 MG/DL — SIGNIFICANT CHANGE UP (ref 70–99)
HCT VFR BLD CALC: 36.5 % — SIGNIFICANT CHANGE UP (ref 34.5–45)
HGB BLD-MCNC: 11.6 G/DL — SIGNIFICANT CHANGE UP (ref 11.5–15.5)
INR BLD: 2.61 — HIGH (ref 0.88–1.17)
MAGNESIUM SERPL-MCNC: 1.9 MG/DL — SIGNIFICANT CHANGE UP (ref 1.6–2.6)
MCHC RBC-ENTMCNC: 27.2 PG — SIGNIFICANT CHANGE UP (ref 27–34)
MCHC RBC-ENTMCNC: 31.8 % — LOW (ref 32–36)
MCV RBC AUTO: 85.5 FL — SIGNIFICANT CHANGE UP (ref 80–100)
NRBC # FLD: 0 K/UL — SIGNIFICANT CHANGE UP (ref 0–0)
PHOSPHATE SERPL-MCNC: 3.2 MG/DL — SIGNIFICANT CHANGE UP (ref 2.5–4.5)
PLATELET # BLD AUTO: 211 K/UL — SIGNIFICANT CHANGE UP (ref 150–400)
PMV BLD: 11.1 FL — SIGNIFICANT CHANGE UP (ref 7–13)
POTASSIUM SERPL-MCNC: 4.4 MMOL/L — SIGNIFICANT CHANGE UP (ref 3.5–5.3)
POTASSIUM SERPL-SCNC: 4.4 MMOL/L — SIGNIFICANT CHANGE UP (ref 3.5–5.3)
PROTHROM AB SERPL-ACNC: 29.8 SEC — HIGH (ref 9.8–13.1)
RBC # BLD: 4.27 M/UL — SIGNIFICANT CHANGE UP (ref 3.8–5.2)
RBC # FLD: 15 % — HIGH (ref 10.3–14.5)
SODIUM SERPL-SCNC: 140 MMOL/L — SIGNIFICANT CHANGE UP (ref 135–145)
WBC # BLD: 11.31 K/UL — HIGH (ref 3.8–10.5)
WBC # FLD AUTO: 11.31 K/UL — HIGH (ref 3.8–10.5)

## 2019-04-12 PROCEDURE — 99233 SBSQ HOSP IP/OBS HIGH 50: CPT

## 2019-04-12 RX ORDER — WARFARIN SODIUM 2.5 MG/1
5 TABLET ORAL ONCE
Qty: 0 | Refills: 0 | Status: DISCONTINUED | OUTPATIENT
Start: 2019-04-12 | End: 2019-04-12

## 2019-04-12 RX ORDER — METOPROLOL TARTRATE 50 MG
1.5 TABLET ORAL
Qty: 0 | Refills: 0 | DISCHARGE
Start: 2019-04-12 | End: 2019-05-11

## 2019-04-12 RX ORDER — PANTOPRAZOLE SODIUM 20 MG/1
1 TABLET, DELAYED RELEASE ORAL
Qty: 30 | Refills: 0
Start: 2019-04-12 | End: 2019-05-11

## 2019-04-12 RX ORDER — METOPROLOL TARTRATE 50 MG
50 TABLET ORAL ONCE
Qty: 0 | Refills: 0 | Status: COMPLETED | OUTPATIENT
Start: 2019-04-12 | End: 2019-04-12

## 2019-04-12 RX ORDER — METOPROLOL TARTRATE 50 MG
25 TABLET ORAL ONCE
Qty: 0 | Refills: 0 | Status: COMPLETED | OUTPATIENT
Start: 2019-04-12 | End: 2019-04-12

## 2019-04-12 RX ORDER — METOPROLOL TARTRATE 50 MG
1 TABLET ORAL
Qty: 60 | Refills: 0
Start: 2019-04-12 | End: 2019-05-11

## 2019-04-12 RX ORDER — METOPROLOL TARTRATE 50 MG
1 TABLET ORAL
Qty: 0 | Refills: 0 | COMMUNITY

## 2019-04-12 RX ORDER — PANTOPRAZOLE SODIUM 20 MG/1
1 TABLET, DELAYED RELEASE ORAL
Qty: 0 | Refills: 0 | COMMUNITY
Start: 2019-04-12

## 2019-04-12 RX ORDER — METOPROLOL TARTRATE 50 MG
50 TABLET ORAL
Qty: 0 | Refills: 0 | Status: DISCONTINUED | OUTPATIENT
Start: 2019-04-13 | End: 2019-04-12

## 2019-04-12 RX ORDER — METOPROLOL TARTRATE 50 MG
25 TABLET ORAL THREE TIMES A DAY
Qty: 0 | Refills: 0 | Status: DISCONTINUED | OUTPATIENT
Start: 2019-04-12 | End: 2019-04-12

## 2019-04-12 RX ORDER — METOPROLOL TARTRATE 50 MG
50 TABLET ORAL
Qty: 0 | Refills: 0 | Status: DISCONTINUED | OUTPATIENT
Start: 2019-04-12 | End: 2019-04-12

## 2019-04-12 RX ORDER — METOPROLOL TARTRATE 50 MG
25 TABLET ORAL
Qty: 0 | Refills: 0 | Status: DISCONTINUED | OUTPATIENT
Start: 2019-04-12 | End: 2019-04-12

## 2019-04-12 RX ADMIN — Medication 25 MILLIGRAM(S): at 02:45

## 2019-04-12 RX ADMIN — PANTOPRAZOLE SODIUM 40 MILLIGRAM(S): 20 TABLET, DELAYED RELEASE ORAL at 06:23

## 2019-04-12 RX ADMIN — Medication 1 TABLET(S): at 13:21

## 2019-04-12 RX ADMIN — Medication 25 MILLIGRAM(S): at 06:23

## 2019-04-12 RX ADMIN — Medication 50 MILLIGRAM(S): at 13:21

## 2019-04-12 NOTE — PROGRESS NOTE ADULT - ASSESSMENT
78F w/ PMH of HTN, mild AS, MVR x2 with post op atrial fibrillation (AC w/ Coumadin) and atrial tachycardia s/p prior DCCV who is admitted s/p elective atrial tachycardia ablation without apparent complication. Patient had successful ablation of 4 ATs.  Right groin site clean, dry and intact with no bleeding or hematoma. Post procedure instructions given to patient and she demonstrates understanding of the instructions. Garcia's HR was in 100s this morning likely AT with VR 100s. She was on metoprolol 25 mg BID which is now increased to 50mg BID. Patient is now converted to sinus rhythm with HR 70s. Discussed with Dr. Potter.  - Continue metoprolol 50mg BID  - Continue Coumadin for anticoagulation  - Continue Protonix  - May D/C home  - F/U appointment with Dr. Potter on 4/26/19 @ 10:30 am

## 2019-04-12 NOTE — DISCHARGE NOTE PROVIDER - NSDCCPCAREPLAN_GEN_ALL_CORE_FT
PRINCIPAL DISCHARGE DIAGNOSIS  Diagnosis: Atrial fibrillation  Assessment and Plan of Treatment: You recieved an ablation procedure to help correct your atrial fibrillation and atrial tachycardia rhythms. During the procedure they abalted mutiple atreas of your heart which resulted in a return to a nromal rhythm. However you heart rate was slightly elevated after the procedure so we adjsuted your beta-blocker medication (Metoprolol) to be 50mg twice a day dosing.  You will continue to take this new dose of metoprolol when you go home.   despite the procedure, you will alaos continue to take your Warfarin as previosuly prescribed with your normal blood test follow up appointments to monitor it. You should follow up with Dr. Potter's office after your discharge from the hosptial to review any new symptoms and medication changes.   If you should begin feelign very short of breath or have new chest pain please see a doctor immediatly. As a preventative measure we are prescribing you a new medication, PRotonix, to take every morning. This will help protect you against any rare complications of your ablation procedure. Hwoever, if you should start developing severe stomach pain, vomitting, or have blood in your vomit then please see a phsyicain as soon as possible. PRINCIPAL DISCHARGE DIAGNOSIS  Diagnosis: Atrial fibrillation  Assessment and Plan of Treatment: You recieved an ablation procedure to help correct your atrial fibrillation and atrial tachycardia rhythms. During the procedure they abalted mutiple atreas of your heart which resulted in a return to a nromal rhythm. However you heart rate was slightly elevated after the procedure so we adjsuted your beta-blocker medication (Metoprolol) to be 50mg twice a day dosing.  You will continue to take this new dose of metoprolol when you go home.   Despite the procedure, you will also continue to take your Warfarin as previosuly prescribed with your normal blood test follow up appointments to monitor it. You should follow up with Dr. Potter's office after your discharge from the hosptial to review any new symptoms and medication changes.   If you should begin feelign very short of breath or have new chest pain please see a doctor immediatly. As a preventative measure we are prescribing you a new medication, PRotonix, to take every morning. This will help protect you against any rare complications of your ablation procedure. Hwoever, if you should start developing severe stomach pain, vomitting, or have blood in your vomit then please see a phsyicain as soon as possible.

## 2019-04-12 NOTE — PROGRESS NOTE ADULT - ASSESSMENT
78F w/ PMH of HTN, mild AS, MVR x2 with post op atrial fibrillation (AC w/ Coumadin) and atrial tachycardia s/p prior DCCV who presents s/p elective atrial fibrillation ablation without apparent complication.     #Neuro:  - A/Ox3  - No active issues    #Cardio:  AFib s/p ablation  - NSR at present, tachy to low 100's ON  - c/w half of home metoprolol (50mg TID) - held initially for low SBP  -- Metop 25mg TID  - c/w Coumadin 5 (home dose)  - f/u INR qd    #Pulmonary:  - satting well on RA  - no active issues    #GI / ENDO:  - resume diet DASH/TLC  - GI PPX - PPI, no sulfacrate    #Renal:  - Cr stable  - Urinary retention post-anesthesia, now voiding well    #Heme:  - hgb stable  - no active issues    #Infectious Disease:  - afebrile  - no leukocytosis  - monitor off abx    #PPX/DISPO:  - DVT: Coumadin  - DIET: DASH/TLC  - DISPO: Home after 24 hour monitoring.

## 2019-04-12 NOTE — PROGRESS NOTE ADULT - SUBJECTIVE AND OBJECTIVE BOX
Patient is seen and examined. 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  S/P hysterectomy with oophorectomy  S/P hysterectomy      MEDICATIONS  (STANDING):  calcium carbonate 1250 mG  + Vitamin D (OsCal 500 + D) 1 Tablet(s) Oral daily  pantoprazole    Tablet 40 milliGRAM(s) Oral before breakfast  warfarin 5 milliGRAM(s) Oral once    MEDICATIONS  (PRN):      Vital Signs Last 24 Hrs  T(C): 36.9 (12 Apr 2019 08:00), Max: 36.9 (12 Apr 2019 08:00)  T(F): 98.4 (12 Apr 2019 08:00), Max: 98.4 (12 Apr 2019 08:00)  HR: 104 (12 Apr 2019 11:00) (70 - 108)  BP: 96/55 (12 Apr 2019 11:00) (83/44 - 106/55)  BP(mean): 66 (12 Apr 2019 11:00) (55 - 71)  RR: 18 (12 Apr 2019 11:00) (13 - 24)  SpO2: 100% (12 Apr 2019 07:00) (99% - 100%)      INTERPRETATION OF TELEMETRY: Sinus rhythm with HR 70s and intermittent AT with VR 100s      LABS:                        11.6   11.31 )-----------( 211      ( 12 Apr 2019 06:00 )             36.5     04-12    140  |  105  |  16  ----------------------------<  94  4.4   |  26  |  0.84    Ca    8.9      12 Apr 2019 06:00  Phos  3.2     04-12  Mg     1.9     04-12    TPro  6.4  /  Alb  3.6  /  TBili  1.1  /  DBili  x   /  AST  42<H>  /  ALT  21  /  AlkPhos  68  04-11        PT/INR - ( 12 Apr 2019 06:00 )   PT: 29.8 SEC;   INR: 2.61          PTT - ( 12 Apr 2019 06:00 )  PTT:33.0 SEC    I&O's Summary    11 Apr 2019 07:01  -  12 Apr 2019 07:00  --------------------------------------------------------  IN: 0 mL / OUT: 1800 mL / NET: -1800 mL            PHYSICAL EXAM:    GENERAL: In no apparent distress, well nourished, and hydrated.  HEAD:  Atraumatic, Normocephalic  HEART: Regular rate and rhythm; No murmurs, rubs, or gallops.  PULMONARY: Clear to auscultation and percussion.  No rales, wheezing, or rhonchi bilaterally.  ABDOMEN: Soft, Nontender, Nondistended; Bowel sounds present  EXTREMITIES:  2+ Peripheral Pulses, No clubbing, cyanosis, or edema

## 2019-04-12 NOTE — DISCHARGE NOTE NURSING/CASE MANAGEMENT/SOCIAL WORK - NSDCDPATPORTLINK_GEN_ALL_CORE
You can access the [facility] Patient Portal, offered by [facility], by registering with the following website: [email or web address]

## 2019-04-12 NOTE — DISCHARGE NOTE PROVIDER - CARE PROVIDER_API CALL
Osiel Potter (MD)  Cardiac Electrophysiology; Cardiovascular Disease; Internal Medicine  [address] [address] [place] [address], [address] [place] [address]  Phone: [phone]  Fax: [phone]  Follow Up Time: 2 weeks

## 2019-04-12 NOTE — DISCHARGE NOTE PROVIDER - HOSPITAL COURSE
78 year old female with HTN, mild AS, MVR x2 with post op atrial fibrillation (on Coumadin) with atrial tachycardia with DCCV 1 month ago who has been feeling better since who presents s/p elective atrial fibrillation ablation. She has been stable since presentation w/o evidence of AFib on monitor prior to ablation. She has a previous diagnosis of rheumatic heart dz w/ 2x MV replacement in 2009 and 2017.        Per report she was ablated twice for Afib and atrial tachycardia. The ablation procedure was w/o complications and the pt was transferred to CCU for further monitoring.        Overnight in the CCU the patient remained stable with HR in the low 100's. She was restarted on half of her home dose metoprolol initially due to low SBPs in the 80's- low 90's, which was changed to Metoprolol 50mg BID. Per EP, pt may be having recurrent atrial tachycardia despite ablation previous day. The pt remained afebrile, asymptomatic, with mild tachycardia. She was discharged with close EP follow up outpatient on her new dose of metoprolol.        At the time of discharge, pt remains hemodynamically stable, with normal sinus rhythm, and no apparent complications after her EP procedure. 78 year old female with HTN, mild AS, MVR x2 with post op atrial fibrillation (on Coumadin) with atrial tachycardia with DCCV 1 month ago who has been feeling better since who presents s/p elective atrial fibrillation ablation. She has been stable since presentation w/o evidence of AFib on monitor prior to ablation. She has a previous diagnosis of rheumatic heart dz w/ 2x MV replacement in 2009 and 2017.        Per report she was ablated twice for Afib and atrial tachycardia. The ablation procedure was w/o complications and the pt was transferred to CCU for further monitoring.        Overnight in the CCU the patient remained stable with HR in the low 100's. She was restarted on half of her home dose metoprolol initially due to low SBPs in the 80's- low 90's, which was changed to Metoprolol 50mg BID. Per EP, pt may be having recurrent atrial tachycardia despite ablation previous day. The pt remained afebrile, asymptomatic, with mild tachycardia. She was discharged with close EP follow up outpatient on her new dose of metoprolol. Appointment on 4/26 w/ EP.        At the time of discharge, pt remains hemodynamically stable, with normal sinus rhythm, and no apparent complications after her EP procedure.

## 2019-04-12 NOTE — PROGRESS NOTE ADULT - SUBJECTIVE AND OBJECTIVE BOX
Patient is a 78y old  Female who presents with a chief complaint of     SUBJECTIVE / OVERNIGHT EVENTS: No acute events overnight. Pt had mildly elevated HR On, reported feeling her rapid heart beat and was worried by it, but otherwise asymptomatic. Denies F/C, CP, SOB, abdominal pain, N/V/D/C.    MEDICATIONS  (STANDING):  calcium carbonate 1250 mG  + Vitamin D (OsCal 500 + D) 1 Tablet(s) Oral daily  metoprolol tartrate 25 milliGRAM(s) Oral three times a day  pantoprazole    Tablet 40 milliGRAM(s) Oral before breakfast    MEDICATIONS  (PRN):    ICU Vital Signs Last 24 Hrs  T(C): 36.4 (12 Apr 2019 04:00), Max: 36.6 (11 Apr 2019 15:24)  T(F): 97.6 (12 Apr 2019 04:00), Max: 97.9 (11 Apr 2019 15:24)  HR: 101 (12 Apr 2019 07:00) (70 - 108)  BP: 97/60 (12 Apr 2019 07:00) (83/44 - 97/60)  BP(mean): 71 (12 Apr 2019 07:00) (55 - 71)  RR: 19 (12 Apr 2019 07:00) (13 - 24)  SpO2: 100% (12 Apr 2019 07:00) (99% - 100%)    CAPILLARY BLOOD GLUCOSE    I&O's Summary    11 Apr 2019 07:01  -  12 Apr 2019 07:00  --------------------------------------------------------  IN: 0 mL / OUT: 1800 mL / NET: -1800 mL      PHYSICAL EXAM:  GEN: Appears in no acute distress, resting comfortably in bed  HEENT: neck supple, no lymphadenopathy, no JVD  PULM: Clear to auscultation bilaterally, no wheezes, rales, rhonchi  CV: Regular rate and rhythm, S1S2, no murmurs, rubs or gallops  ABD: Soft, nontender to palpation, non-distended, normoactive bowel sounds  EXTREMITIES: No edema, + peripheral pulses  NEURO: AAOx3, moving all four extremities spontaneously    LABS & IMAGING:  Labs personally reviewed [X]                        11.6   11.31 )-----------( 211      ( 12 Apr 2019 06:00 )             36.5     Hgb Trend: 11.6<--, 12.7<--    04-12    140  |  105  |  16  ----------------------------<  94  4.4   |  26  |  0.84    Ca    8.9      12 Apr 2019 06:00  Phos  3.2     04-12  Mg     1.9     04-12    TPro  6.4  /  Alb  3.6  /  TBili  1.1  /  DBili  x   /  AST  42<H>  /  ALT  21  /  AlkPhos  68  04-11    Creatinine Trend: 0.84<--, 0.97<--    PT/INR - ( 12 Apr 2019 06:00 )   PT: 29.8 SEC;   INR: 2.61     PTT - ( 12 Apr 2019 06:00 )  PTT:33.0 SEC

## 2019-04-22 NOTE — DISCHARGE NOTE ADULT - CARE PLAN
Follow up: 3 Months.        Please note: 24 hour notice for cancellation of appointment is required.    You may receive a survey in the mail, or via the e-mail address that you have provided.  We would appreciate if you could fill out the survey and provide us with any feedback on your experience regarding your visit today. Thank you for allowing us to provide you with your health care needs.     Do not hesitate to call if you are experiencing severe pain, worsening or change in your pain, have symptoms of infection (fever, warmth, redness, increased drainage), or have any other problem that concerns you ~ [phone] (or [phone] after hours).    Please remember when requesting refills on pain medication that the request should be made by Thursday at the latest. [facility] is open Monday-Friday, 8am-5pm, and closed on the weekends.  No narcotic refills will be filled after hours.    Additional Educational Resources:  For additional resources regarding your symptoms, diagnosis, or further health information, please visit the Health Resources section on [email or web address] or the Online Health Resources section in [employer].     Principal Discharge DX:	Rheumatic mitral regurgitation  Goal:	Recover from Surgery  Instructions for follow-up, activity and diet:	Please follow up with Dr. Kinney in 2 weeks. Call the office for an appointment.  Please follow up with your Cardiologist and Primary Care Physician 2-4 weeks from discharge.    Please call the Cardiothoracic Surgery office at [phone] if you are experiencing any shortness of breath, chest pain, fevers or chills, drainage from the incisions, persistent nausea, vomiting or if you have any questions about your medications. If the symptoms are severe, call 911 and go to the nearest hospital. You can also call [phone] for an emergency [facility] ambulance, which will take you to the closest [facility].  Secondary Diagnosis:	Atrial fibrillation, unspecified type  Goal:	Continue anticoagulation (blood thinner) and amiodarone (antiarrhythmia)  Instructions for follow-up, activity and diet:	Please have your PT/INR levels checked on Monday  and then on Thursday and then weekly, please have all blood work fax to Dr. Vasquez  for your coumadin dosage.    Continue all medication as ordered.

## 2019-04-26 ENCOUNTER — NON-APPOINTMENT (OUTPATIENT)
Age: 79
End: 2019-04-26

## 2019-04-26 ENCOUNTER — APPOINTMENT (OUTPATIENT)
Dept: ELECTROPHYSIOLOGY | Facility: CLINIC | Age: 79
End: 2019-04-26
Payer: COMMERCIAL

## 2019-04-26 VITALS
HEIGHT: 65 IN | WEIGHT: 112 LBS | DIASTOLIC BLOOD PRESSURE: 72 MMHG | HEART RATE: 62 BPM | SYSTOLIC BLOOD PRESSURE: 139 MMHG | OXYGEN SATURATION: 98 % | BODY MASS INDEX: 18.66 KG/M2 | RESPIRATION RATE: 14 BRPM

## 2019-04-26 DIAGNOSIS — R00.2 PALPITATIONS: ICD-10-CM

## 2019-04-26 PROCEDURE — 93000 ELECTROCARDIOGRAM COMPLETE: CPT

## 2019-04-26 PROCEDURE — 99213 OFFICE O/P EST LOW 20 MIN: CPT

## 2019-04-26 RX ORDER — WARFARIN 3 MG/1
3 TABLET ORAL
Qty: 90 | Refills: 0 | Status: DISCONTINUED | COMMUNITY
Start: 2019-04-15

## 2019-04-26 RX ORDER — WARFARIN 7.5 MG/1
7.5 TABLET ORAL
Qty: 2 | Refills: 0 | Status: DISCONTINUED | COMMUNITY
Start: 2019-01-30

## 2019-04-26 RX ORDER — WARFARIN 1 MG/1
1 TABLET ORAL
Qty: 90 | Refills: 0 | Status: DISCONTINUED | COMMUNITY
Start: 2019-04-15

## 2019-04-26 RX ORDER — DIGOXIN 125 UG/1
125 TABLET ORAL
Qty: 7 | Refills: 0 | Status: DISCONTINUED | COMMUNITY
Start: 2019-01-30

## 2019-04-26 RX ORDER — METOPROLOL TARTRATE 25 MG/1
25 TABLET, FILM COATED ORAL
Qty: 180 | Refills: 0 | Status: DISCONTINUED | COMMUNITY
Start: 2019-04-22

## 2019-04-26 RX ORDER — ECONAZOLE NITRATE 10 MG/G
1 CREAM TOPICAL
Qty: 60 | Refills: 0 | Status: DISCONTINUED | COMMUNITY
Start: 2018-11-09

## 2019-04-26 NOTE — HISTORY OF PRESENT ILLNESS
[FreeTextEntry1] : Amber Guerrero MD\par \par I saw Felicity Carlos on April 26, 2019. As you know, she is a 77y/o woman with Hx of HTN, aortic stenosis, rheumatic heart disease, MVR x2 with post op afib, and newly diagnosed AT s/p DCCV on 3/15/2019 and PVI and ATx4 ablation on 4/11/2019 who presents today for routine f/u. Admits doing well post ablation although did have one episode of palpitations, she believes brief in duration as she fell asleep during. Denies chest pain, further palpitations, SOB, syncope or near syncope. Right groin without pain, swelling, or bruising.

## 2019-04-26 NOTE — DISCUSSION/SUMMARY
[FreeTextEntry1] : In summary, Felicity Carlos is a 77y/o woman with Hx of HTN, aortic stenosis, rheumatic heart disease, MVR x2 with post op afib, and newly diagnosed AT s/p DCCV on 3/15/2019 and PVI and ATx4 ablation on 4/11/2019 who presents today for routine f/u. Admits doing well post ablation although did have one episode of palpitations, she believes brief in duration as she fell asleep during. Denies chest pain, further palpitations, SOB, syncope or near syncope. Right groin without pain, swelling, or bruising. EKG today NSR. Will continue metoprolol BID as prescribed and warfarin for thromboembolic prophylaxis. Patient will f/u in 2 months. \par \par Sincerely,\par \par Osiel Potter MD

## 2019-04-26 NOTE — PHYSICAL EXAM
[General Appearance - Well Developed] : well developed [Normal Appearance] : normal appearance [Well Groomed] : well groomed [General Appearance - Well Nourished] : well nourished [No Deformities] : no deformities [General Appearance - In No Acute Distress] : no acute distress [Normal Conjunctiva] : the conjunctiva exhibited no abnormalities [Eyelids - No Xanthelasma] : the eyelids demonstrated no xanthelasmas [Normal Oral Mucosa] : normal oral mucosa [No Oral Pallor] : no oral pallor [No Oral Cyanosis] : no oral cyanosis [Normal Jugular Venous A Waves Present] : normal jugular venous A waves present [Normal Jugular Venous V Waves Present] : normal jugular venous V waves present [No Jugular Venous Giron A Waves] : no jugular venous giron A waves [Respiration, Rhythm And Depth] : normal respiratory rhythm and effort [Exaggerated Use Of Accessory Muscles For Inspiration] : no accessory muscle use [Auscultation Breath Sounds / Voice Sounds] : lungs were clear to auscultation bilaterally [Heart Rate And Rhythm] : heart rate and rhythm were normal [Heart Sounds] : normal S1 and S2 [Murmurs] : no murmurs present [Abdomen Tenderness] : non-tender [Abdomen Soft] : soft [Abdomen Mass (___ Cm)] : no abdominal mass palpated [Abnormal Walk] : normal gait [Gait - Sufficient For Exercise Testing] : the gait was sufficient for exercise testing [Nail Clubbing] : no clubbing of the fingernails [Cyanosis, Localized] : no localized cyanosis [Petechial Hemorrhages (___cm)] : no petechial hemorrhages [Skin Color & Pigmentation] : normal skin color and pigmentation [] : no rash [No Venous Stasis] : no venous stasis [Skin Lesions] : no skin lesions [No Skin Ulcers] : no skin ulcer [No Xanthoma] : no  xanthoma was observed [Oriented To Time, Place, And Person] : oriented to person, place, and time [Affect] : the affect was normal [No Anxiety] : not feeling anxious [Mood] : the mood was normal

## 2019-06-28 ENCOUNTER — APPOINTMENT (OUTPATIENT)
Dept: ELECTROPHYSIOLOGY | Facility: CLINIC | Age: 79
End: 2019-06-28
Payer: MEDICARE

## 2019-06-28 ENCOUNTER — NON-APPOINTMENT (OUTPATIENT)
Age: 79
End: 2019-06-28

## 2019-06-28 VITALS
BODY MASS INDEX: 19.33 KG/M2 | OXYGEN SATURATION: 100 % | DIASTOLIC BLOOD PRESSURE: 71 MMHG | WEIGHT: 116 LBS | HEART RATE: 62 BPM | HEIGHT: 65 IN | SYSTOLIC BLOOD PRESSURE: 153 MMHG

## 2019-06-28 PROCEDURE — 93000 ELECTROCARDIOGRAM COMPLETE: CPT

## 2019-06-28 PROCEDURE — 99214 OFFICE O/P EST MOD 30 MIN: CPT

## 2019-06-28 NOTE — REASON FOR VISIT
[Follow-Up - Clinic] : a clinic follow-up of [Atrial Fibrillation] : atrial fibrillation [FreeTextEntry1] : AT

## 2019-06-28 NOTE — HISTORY OF PRESENT ILLNESS
[FreeTextEntry1] : Amber Guerrero MD\par \par Felicity Carlos is a 79y/o woman with Hx of HTN, aortic stenosis, rheumatic heart disease, MVR x2 with post op afib, and newly diagnosed AT s/p DCCV on 3/15/2019 and PVI and ATx4 ablation on 4/11/2019 who presents today for routine f/u. Admits doing well post ablation although still having some episodes of palpitations which are brief in duration and not as often as prior to procedure. Longest episode lasted about an hour before going to sleep but was able to sleep the night. Episodes do not occur daily and are tolerable. Denies chest pain, SOB, syncope or near syncope.\par

## 2019-06-28 NOTE — DISCUSSION/SUMMARY
[FreeTextEntry1] : In summary, Felicity Carlos is a 79y/o woman with Hx of HTN, aortic stenosis, rheumatic heart disease, MVR x2 with post op afib, and newly diagnosed AT s/p DCCV on 3/15/2019 and PVI and ATx4 ablation on 4/11/2019 who presents today for routine f/u. Admits doing well post ablation although still having some episodes of palpitations which are brief in duration and not as often as prior to procedure. Longest episode lasted about an hour before going to sleep but was able to sleep the night. Episodes do not occur daily and are tolerable. Denies chest pain, SOB, syncope or near syncope. Will continue metoprolol as prescribed and warfarin for thromboembolic prophylaxis.  Discussed ablation of what sounds to be recurrence of one of the 4 ATs that was ablated (regular tachycardia), but patient prefers to see how her symptoms progress. \par \par Sincerely,\par \par Osiel Potter MD

## 2019-06-28 NOTE — PHYSICAL EXAM
[General Appearance - Well Developed] : well developed [Normal Appearance] : normal appearance [Well Groomed] : well groomed [General Appearance - Well Nourished] : well nourished [No Deformities] : no deformities [Normal Conjunctiva] : the conjunctiva exhibited no abnormalities [General Appearance - In No Acute Distress] : no acute distress [Eyelids - No Xanthelasma] : the eyelids demonstrated no xanthelasmas [Normal Oral Mucosa] : normal oral mucosa [No Oral Pallor] : no oral pallor [No Oral Cyanosis] : no oral cyanosis [Normal Jugular Venous A Waves Present] : normal jugular venous A waves present [Normal Jugular Venous V Waves Present] : normal jugular venous V waves present [No Jugular Venous Giron A Waves] : no jugular venous giron A waves [Respiration, Rhythm And Depth] : normal respiratory rhythm and effort [Exaggerated Use Of Accessory Muscles For Inspiration] : no accessory muscle use [Auscultation Breath Sounds / Voice Sounds] : lungs were clear to auscultation bilaterally [Heart Rate And Rhythm] : heart rate and rhythm were normal [Heart Sounds] : normal S1 and S2 [Murmurs] : no murmurs present [Abdomen Soft] : soft [Abdomen Tenderness] : non-tender [Abdomen Mass (___ Cm)] : no abdominal mass palpated [Abnormal Walk] : normal gait [Gait - Sufficient For Exercise Testing] : the gait was sufficient for exercise testing [Nail Clubbing] : no clubbing of the fingernails [Cyanosis, Localized] : no localized cyanosis [Petechial Hemorrhages (___cm)] : no petechial hemorrhages [Skin Color & Pigmentation] : normal skin color and pigmentation [] : no rash [No Venous Stasis] : no venous stasis [Skin Lesions] : no skin lesions [No Skin Ulcers] : no skin ulcer [No Xanthoma] : no  xanthoma was observed [Oriented To Time, Place, And Person] : oriented to person, place, and time [Affect] : the affect was normal [Mood] : the mood was normal [No Anxiety] : not feeling anxious

## 2019-09-03 ENCOUNTER — RX RENEWAL (OUTPATIENT)
Age: 79
End: 2019-09-03

## 2019-10-04 ENCOUNTER — NON-APPOINTMENT (OUTPATIENT)
Age: 79
End: 2019-10-04

## 2019-10-04 ENCOUNTER — APPOINTMENT (OUTPATIENT)
Dept: ELECTROPHYSIOLOGY | Facility: CLINIC | Age: 79
End: 2019-10-04
Payer: MEDICARE

## 2019-10-04 VITALS
HEIGHT: 65 IN | DIASTOLIC BLOOD PRESSURE: 69 MMHG | HEART RATE: 62 BPM | BODY MASS INDEX: 18.99 KG/M2 | WEIGHT: 114 LBS | OXYGEN SATURATION: 100 % | SYSTOLIC BLOOD PRESSURE: 163 MMHG

## 2019-10-04 PROCEDURE — 99214 OFFICE O/P EST MOD 30 MIN: CPT

## 2019-10-04 PROCEDURE — 93000 ELECTROCARDIOGRAM COMPLETE: CPT

## 2019-10-04 NOTE — PHYSICAL EXAM
[General Appearance - Well Developed] : well developed [Normal Appearance] : normal appearance [Well Groomed] : well groomed [General Appearance - Well Nourished] : well nourished [No Deformities] : no deformities [General Appearance - In No Acute Distress] : no acute distress [Normal Conjunctiva] : the conjunctiva exhibited no abnormalities [Eyelids - No Xanthelasma] : the eyelids demonstrated no xanthelasmas [Normal Oral Mucosa] : normal oral mucosa [No Oral Pallor] : no oral pallor [No Oral Cyanosis] : no oral cyanosis [Normal Jugular Venous A Waves Present] : normal jugular venous A waves present [Normal Jugular Venous V Waves Present] : normal jugular venous V waves present [No Jugular Venous Grion A Waves] : no jugular venous giron A waves [Respiration, Rhythm And Depth] : normal respiratory rhythm and effort [Exaggerated Use Of Accessory Muscles For Inspiration] : no accessory muscle use [Auscultation Breath Sounds / Voice Sounds] : lungs were clear to auscultation bilaterally [Heart Rate And Rhythm] : heart rate and rhythm were normal [Heart Sounds] : normal S1 and S2 [Murmurs] : no murmurs present [Abdomen Soft] : soft [Abdomen Tenderness] : non-tender [Abdomen Mass (___ Cm)] : no abdominal mass palpated [Abnormal Walk] : normal gait [Gait - Sufficient For Exercise Testing] : the gait was sufficient for exercise testing [Nail Clubbing] : no clubbing of the fingernails [Cyanosis, Localized] : no localized cyanosis [Petechial Hemorrhages (___cm)] : no petechial hemorrhages [Skin Color & Pigmentation] : normal skin color and pigmentation [] : no rash [No Venous Stasis] : no venous stasis [Skin Lesions] : no skin lesions [No Skin Ulcers] : no skin ulcer [No Xanthoma] : no  xanthoma was observed [Oriented To Time, Place, And Person] : oriented to person, place, and time [Affect] : the affect was normal [Mood] : the mood was normal [No Anxiety] : not feeling anxious

## 2019-10-04 NOTE — DISCUSSION/SUMMARY
[FreeTextEntry1] : In summary, Felicity Carlos is a 79y/o woman with Hx of HTN, aortic stenosis, rheumatic heart disease, MVR x2 with post op afib, and newly diagnosed AT s/p DCCV on 3/15/2019 and PVI and ATx4 ablation on 4/11/2019 who presents today for routine f/u. Admits doing well post ablation although still having some episodes of palpitations which are brief in duration and not as often as prior to procedure. Longest episode lasted about an hour before going to sleep but was able to sleep the night. Episodes do not occur daily and are tolerable. Denies chest pain, SOB, syncope or near syncope. Her palpitations manifest as pounding and occurred 3 times in the past 3 weeks, lasting up to a 30 minutes, the longest episode was about 8 hours. I recommended that she increase metoprolol to 75 mg PO BID and that she follow-up in 2 weeks. I also recommended that she undergo Holter monitor. \par \par Sincerely,\par \par Osiel Potter MD

## 2019-10-04 NOTE — HISTORY OF PRESENT ILLNESS
[FreeTextEntry1] : Amber Guerrero MD\par \par Felicity Carlos is a 77y/o woman with Hx of HTN, aortic stenosis, rheumatic heart disease, MVR x2 with post op afib, and newly diagnosed AT s/p DCCV on 3/15/2019 and PVI and ATx4 ablation on 4/11/2019 who presents today for routine f/u. Admits doing well post ablation although still having some episodes of palpitations which are brief in duration and not as often as prior to procedure. Longest episode lasted about an hour before going to sleep but was able to sleep the night. Episodes do not occur daily and are tolerable. Denies chest pain, SOB, syncope or near syncope. Her palpitations manifest as pounding and occurred 3 times in the past 3 weeks, lasting up to a 30 minutes, the lngest episode was about 8 hours. \par

## 2019-10-18 ENCOUNTER — NON-APPOINTMENT (OUTPATIENT)
Age: 79
End: 2019-10-18

## 2019-10-18 ENCOUNTER — APPOINTMENT (OUTPATIENT)
Dept: ELECTROPHYSIOLOGY | Facility: CLINIC | Age: 79
End: 2019-10-18
Payer: MEDICARE

## 2019-10-18 VITALS
DIASTOLIC BLOOD PRESSURE: 63 MMHG | SYSTOLIC BLOOD PRESSURE: 148 MMHG | HEART RATE: 63 BPM | BODY MASS INDEX: 19.16 KG/M2 | HEIGHT: 65 IN | OXYGEN SATURATION: 99 % | WEIGHT: 115 LBS

## 2019-10-18 PROCEDURE — 99215 OFFICE O/P EST HI 40 MIN: CPT

## 2019-10-18 PROCEDURE — 93000 ELECTROCARDIOGRAM COMPLETE: CPT

## 2019-10-18 RX ORDER — WARFARIN 5 MG/1
5 TABLET ORAL DAILY
Refills: 0 | Status: DISCONTINUED | COMMUNITY
Start: 2019-03-12 | End: 2019-10-18

## 2019-10-18 NOTE — REVIEW OF SYSTEMS
[Palpitations] : palpitations [Change In Color Of Skin] : change in skin color [Anxiety] : anxiety [Negative] : Heme/Lymph

## 2019-10-18 NOTE — PHYSICAL EXAM
[General Appearance - Well Developed] : well developed [Normal Appearance] : normal appearance [Well Groomed] : well groomed [General Appearance - Well Nourished] : well nourished [No Deformities] : no deformities [General Appearance - In No Acute Distress] : no acute distress [Normal Conjunctiva] : the conjunctiva exhibited no abnormalities [Eyelids - No Xanthelasma] : the eyelids demonstrated no xanthelasmas [Normal Oral Mucosa] : normal oral mucosa [No Oral Pallor] : no oral pallor [Normal Jugular Venous A Waves Present] : normal jugular venous A waves present [No Oral Cyanosis] : no oral cyanosis [No Jugular Venous Giron A Waves] : no jugular venous giron A waves [Normal Jugular Venous V Waves Present] : normal jugular venous V waves present [Respiration, Rhythm And Depth] : normal respiratory rhythm and effort [Exaggerated Use Of Accessory Muscles For Inspiration] : no accessory muscle use [Heart Rate And Rhythm] : heart rate and rhythm were normal [Auscultation Breath Sounds / Voice Sounds] : lungs were clear to auscultation bilaterally [Heart Sounds] : normal S1 and S2 [Murmurs] : no murmurs present [Abdomen Soft] : soft [Abdomen Tenderness] : non-tender [Abnormal Walk] : normal gait [Abdomen Mass (___ Cm)] : no abdominal mass palpated [Cyanosis, Localized] : no localized cyanosis [Nail Clubbing] : no clubbing of the fingernails [Gait - Sufficient For Exercise Testing] : the gait was sufficient for exercise testing [Petechial Hemorrhages (___cm)] : no petechial hemorrhages [] : no rash [No Venous Stasis] : no venous stasis [Skin Color & Pigmentation] : normal skin color and pigmentation [No Skin Ulcers] : no skin ulcer [Skin Lesions] : no skin lesions [No Xanthoma] : no  xanthoma was observed [Affect] : the affect was normal [Oriented To Time, Place, And Person] : oriented to person, place, and time [No Anxiety] : not feeling anxious [Mood] : the mood was normal

## 2019-10-19 NOTE — HISTORY OF PRESENT ILLNESS
[FreeTextEntry1] : Amber Guerrero MD\par \par Felicity Carlos is a 79y/o woman with Hx of HTN, aortic stenosis, rheumatic heart disease, MVR x2 2007/2017 with post op afib, and newly diagnosed AT s/p DCCV on 3/15/2019 and PVI and AT ablation on 4/11/2019, normal EFwho presents today for f/u. Has been off Coumadin since June, now on Asa 81mg\par Over the past 2 weeks has experienced palpitations/pounding heart rate , October 6th x4 hours, October 8th x 3 hours, October 12th x 3 hours, October 15th x9 hours .  Most events occurred during the night/ hours of sleep. Last visit metoprolol was increased from 50 mg to 75mg BID \par Denies dizzy spells or associated dizziness, denies chest pains, denies shortness of breath at rest or with activity.  Denies syncope or near syncope. Denies recent GI or dark stools.\par Yesterday was diagnosed with basal cell on right cheek and is pending appointment for consultation regarding removal and treatment.

## 2019-10-19 NOTE — ASSESSMENT
[FreeTextEntry1] : Felicity Carlos is a 77y/o woman with Hx of HTN, aortic stenosis, rheumatic heart disease, MVR x2 2007/2017 with post op afib, and newly diagnosed AT s/p DCCV on 3/15/2019 and PVI and AT ablation on 4/11/2019 .\par  Now with recurrent palpitations despite increase in dose of beta blocker

## 2019-10-19 NOTE — DISCUSSION/SUMMARY
[FreeTextEntry1] : Felicity Carlos is a 77y/o woman with Hx of HTN, aortic stenosis, rheumatic heart disease, MVR x2 2007/2017 with post op afib, and newly diagnosed AT s/p DCCV on 3/15/2019 and PVI and AT ablation on 4/11/2019, normal EFwho presents today for f/u. Has been off Coumadin since June, now on Asa 81mg\par Over the past 2 weeks has experienced palpitations/pounding heart rate , October 6th x4 hours, October 8th x 3 hours, October 12th x 3 hours, October 15th x9 hours .  Most events occurred during the night/ hours of sleep. Last visit metoprolol was increased from 50 mg to 75mg BID \par Denies dizzy spells or associated dizziness, denies chest pains, denies shortness of breath at rest or with activity.  Denies syncope or near syncope. Denies recent GI or dark stools.\par Yesterday was diagnosed with basal cell on right cheek and is pending appointment for consultation regarding removal and treatment.\par \par I recommended that she undergo ablation of what sounds like recurrent atrial tachycardia (not atrial fibrillation) since her rhythm is regular in tachycardia. I also increased metoprolol to 75 mg in the morning and 100 mg in the evening. Risks, benefits and alternatives discussed. \par \par Sincerely,\par \par Osiel Potter MD

## 2019-11-08 ENCOUNTER — APPOINTMENT (OUTPATIENT)
Dept: ELECTROPHYSIOLOGY | Facility: CLINIC | Age: 79
End: 2019-11-08
Payer: MEDICARE

## 2019-11-08 LAB
BASOPHILS # BLD AUTO: 0.07 K/UL
BASOPHILS NFR BLD AUTO: 0.8 %
EOSINOPHIL # BLD AUTO: 0.1 K/UL
EOSINOPHIL NFR BLD AUTO: 1.1 %
HCT VFR BLD CALC: 43.3 %
HGB BLD-MCNC: 13.8 G/DL
IMM GRANULOCYTES NFR BLD AUTO: 0.3 %
LYMPHOCYTES # BLD AUTO: 2.07 K/UL
LYMPHOCYTES NFR BLD AUTO: 23.8 %
MAN DIFF?: NORMAL
MCHC RBC-ENTMCNC: 27.8 PG
MCHC RBC-ENTMCNC: 31.9 GM/DL
MCV RBC AUTO: 87.3 FL
MONOCYTES # BLD AUTO: 1.13 K/UL
MONOCYTES NFR BLD AUTO: 13 %
NEUTROPHILS # BLD AUTO: 5.31 K/UL
NEUTROPHILS NFR BLD AUTO: 61 %
PLATELET # BLD AUTO: 332 K/UL
RBC # BLD: 4.96 M/UL
RBC # FLD: 14.6 %
WBC # FLD AUTO: 8.71 K/UL

## 2019-11-08 PROCEDURE — 36415 COLL VENOUS BLD VENIPUNCTURE: CPT

## 2019-11-09 LAB
ALBUMIN SERPL ELPH-MCNC: 4.4 G/DL
ALP BLD-CCNC: 79 U/L
ALT SERPL-CCNC: 28 U/L
ANION GAP SERPL CALC-SCNC: 15 MMOL/L
AST SERPL-CCNC: 28 U/L
BILIRUB SERPL-MCNC: 1.1 MG/DL
BUN SERPL-MCNC: 20 MG/DL
CALCIUM SERPL-MCNC: 10.2 MG/DL
CHLORIDE SERPL-SCNC: 95 MMOL/L
CO2 SERPL-SCNC: 27 MMOL/L
CREAT SERPL-MCNC: 0.85 MG/DL
GLUCOSE SERPL-MCNC: 76 MG/DL
POTASSIUM SERPL-SCNC: 4.7 MMOL/L
PROT SERPL-MCNC: 7.5 G/DL
SODIUM SERPL-SCNC: 137 MMOL/L

## 2019-11-21 ENCOUNTER — INPATIENT (INPATIENT)
Facility: HOSPITAL | Age: 79
LOS: 0 days | Discharge: HOME | End: 2019-11-22
Attending: INTERNAL MEDICINE | Admitting: INTERNAL MEDICINE
Payer: MEDICARE

## 2019-11-21 ENCOUNTER — TRANSCRIPTION ENCOUNTER (OUTPATIENT)
Age: 79
End: 2019-11-21

## 2019-11-21 VITALS
TEMPERATURE: 99 F | RESPIRATION RATE: 14 BRPM | DIASTOLIC BLOOD PRESSURE: 35 MMHG | SYSTOLIC BLOOD PRESSURE: 80 MMHG | OXYGEN SATURATION: 100 % | HEART RATE: 68 BPM

## 2019-11-21 DIAGNOSIS — C44.91 BASAL CELL CARCINOMA OF SKIN, UNSPECIFIED: Chronic | ICD-10-CM

## 2019-11-21 DIAGNOSIS — I47.1 SUPRAVENTRICULAR TACHYCARDIA: ICD-10-CM

## 2019-11-21 LAB
ANION GAP SERPL CALC-SCNC: 9 MMO/L — SIGNIFICANT CHANGE UP (ref 7–14)
APTT BLD: 30 SEC — SIGNIFICANT CHANGE UP (ref 27.5–36.3)
BLD GP AB SCN SERPL QL: NEGATIVE — SIGNIFICANT CHANGE UP
BUN SERPL-MCNC: 15 MG/DL — SIGNIFICANT CHANGE UP (ref 7–23)
CALCIUM SERPL-MCNC: 8.4 MG/DL — SIGNIFICANT CHANGE UP (ref 8.4–10.5)
CHLORIDE SERPL-SCNC: 104 MMOL/L — SIGNIFICANT CHANGE UP (ref 98–107)
CO2 SERPL-SCNC: 25 MMOL/L — SIGNIFICANT CHANGE UP (ref 22–31)
CREAT SERPL-MCNC: 0.83 MG/DL — SIGNIFICANT CHANGE UP (ref 0.5–1.3)
GLUCOSE SERPL-MCNC: 95 MG/DL — SIGNIFICANT CHANGE UP (ref 70–99)
HCT VFR BLD CALC: 38.9 % — SIGNIFICANT CHANGE UP (ref 34.5–45)
HGB BLD-MCNC: 11.9 G/DL — SIGNIFICANT CHANGE UP (ref 11.5–15.5)
INR BLD: 1.17 — SIGNIFICANT CHANGE UP (ref 0.88–1.17)
MAGNESIUM SERPL-MCNC: 1.7 MG/DL — SIGNIFICANT CHANGE UP (ref 1.6–2.6)
MCHC RBC-ENTMCNC: 27.2 PG — SIGNIFICANT CHANGE UP (ref 27–34)
MCHC RBC-ENTMCNC: 30.6 % — LOW (ref 32–36)
MCV RBC AUTO: 88.8 FL — SIGNIFICANT CHANGE UP (ref 80–100)
NRBC # FLD: 0 K/UL — SIGNIFICANT CHANGE UP (ref 0–0)
PHOSPHATE SERPL-MCNC: 3.4 MG/DL — SIGNIFICANT CHANGE UP (ref 2.5–4.5)
PLATELET # BLD AUTO: 249 K/UL — SIGNIFICANT CHANGE UP (ref 150–400)
PMV BLD: 11.2 FL — SIGNIFICANT CHANGE UP (ref 7–13)
POTASSIUM SERPL-MCNC: 4 MMOL/L — SIGNIFICANT CHANGE UP (ref 3.5–5.3)
POTASSIUM SERPL-SCNC: 4 MMOL/L — SIGNIFICANT CHANGE UP (ref 3.5–5.3)
PROTHROM AB SERPL-ACNC: 13 SEC — SIGNIFICANT CHANGE UP (ref 9.8–13.1)
RBC # BLD: 4.38 M/UL — SIGNIFICANT CHANGE UP (ref 3.8–5.2)
RBC # FLD: 14.3 % — SIGNIFICANT CHANGE UP (ref 10.3–14.5)
RH IG SCN BLD-IMP: POSITIVE — SIGNIFICANT CHANGE UP
SODIUM SERPL-SCNC: 138 MMOL/L — SIGNIFICANT CHANGE UP (ref 135–145)
WBC # BLD: 16.87 K/UL — HIGH (ref 3.8–10.5)
WBC # FLD AUTO: 16.87 K/UL — HIGH (ref 3.8–10.5)

## 2019-11-21 PROCEDURE — 93306 TTE W/DOPPLER COMPLETE: CPT | Mod: 26

## 2019-11-21 PROCEDURE — 93621 COMP EP EVL L PAC&REC C SINS: CPT | Mod: 26

## 2019-11-21 PROCEDURE — 93655 ICAR CATH ABLTJ DSCRT ARRHYT: CPT

## 2019-11-21 PROCEDURE — 93653 COMPRE EP EVAL TX SVT: CPT

## 2019-11-21 PROCEDURE — 93613 INTRACARDIAC EPHYS 3D MAPG: CPT

## 2019-11-21 PROCEDURE — 33208 INSRT HEART PM ATRIAL & VENT: CPT | Mod: 59

## 2019-11-21 PROCEDURE — 71045 X-RAY EXAM CHEST 1 VIEW: CPT | Mod: 26

## 2019-11-21 PROCEDURE — 93010 ELECTROCARDIOGRAM REPORT: CPT

## 2019-11-21 PROCEDURE — 93623 PRGRMD STIMJ&PACG IV RX NFS: CPT | Mod: 26

## 2019-11-21 PROCEDURE — 93662 INTRACARDIAC ECG (ICE): CPT | Mod: 26

## 2019-11-21 PROCEDURE — 93462 L HRT CATH TRNSPTL PUNCTURE: CPT

## 2019-11-21 RX ORDER — SODIUM CHLORIDE 9 MG/ML
250 INJECTION INTRAMUSCULAR; INTRAVENOUS; SUBCUTANEOUS ONCE
Refills: 0 | Status: COMPLETED | OUTPATIENT
Start: 2019-11-21 | End: 2019-11-21

## 2019-11-21 RX ORDER — ASPIRIN/CALCIUM CARB/MAGNESIUM 324 MG
81 TABLET ORAL DAILY
Refills: 0 | Status: DISCONTINUED | OUTPATIENT
Start: 2019-11-22 | End: 2019-11-22

## 2019-11-21 RX ORDER — MAGNESIUM SULFATE 500 MG/ML
2 VIAL (ML) INJECTION ONCE
Refills: 0 | Status: COMPLETED | OUTPATIENT
Start: 2019-11-21 | End: 2019-11-21

## 2019-11-21 RX ORDER — WARFARIN SODIUM 2.5 MG/1
1 TABLET ORAL
Qty: 0 | Refills: 0 | DISCHARGE

## 2019-11-21 RX ORDER — CHLORHEXIDINE GLUCONATE 213 G/1000ML
1 SOLUTION TOPICAL DAILY
Refills: 0 | Status: DISCONTINUED | OUTPATIENT
Start: 2019-11-21 | End: 2019-11-22

## 2019-11-21 RX ORDER — SODIUM CHLORIDE 9 MG/ML
3 INJECTION INTRAMUSCULAR; INTRAVENOUS; SUBCUTANEOUS EVERY 8 HOURS
Refills: 0 | Status: DISCONTINUED | OUTPATIENT
Start: 2019-11-21 | End: 2019-11-22

## 2019-11-21 RX ADMIN — SODIUM CHLORIDE 1000 MILLILITER(S): 9 INJECTION INTRAMUSCULAR; INTRAVENOUS; SUBCUTANEOUS at 15:45

## 2019-11-21 RX ADMIN — SODIUM CHLORIDE 250 MILLILITER(S): 9 INJECTION INTRAMUSCULAR; INTRAVENOUS; SUBCUTANEOUS at 22:11

## 2019-11-21 RX ADMIN — SODIUM CHLORIDE 1000 MILLILITER(S): 9 INJECTION INTRAMUSCULAR; INTRAVENOUS; SUBCUTANEOUS at 15:46

## 2019-11-21 RX ADMIN — Medication 50 GRAM(S): at 17:34

## 2019-11-21 RX ADMIN — SODIUM CHLORIDE 500 MILLILITER(S): 9 INJECTION INTRAMUSCULAR; INTRAVENOUS; SUBCUTANEOUS at 18:54

## 2019-11-21 RX ADMIN — SODIUM CHLORIDE 3 MILLILITER(S): 9 INJECTION INTRAMUSCULAR; INTRAVENOUS; SUBCUTANEOUS at 22:00

## 2019-11-21 NOTE — H&P CARDIOLOGY - PMH
A-fib  2007 coverted 1/2008  Aortic stenosis    BCC (basal cell carcinoma of skin)    Constipation, unspecified constipation type    Fibroid    [place] (hard of hearing)    HTN (hypertension), benign    Melanoma    Mitral valve disease    Ovarian cyst    Rheumatic fever  childhood  Rheumatic fever  at 11 years of age

## 2019-11-21 NOTE — H&P CARDIOLOGY - REVIEW OF SYSTEMS
The patient denies chest pain, SOB,  dizziness, presyncope, syncope, b/l lower  extremities edema, abdominal pain, N/V/D/C, melena, hematochezia, h/o DVT, PE, STACY, fever, chills, urinary symptoms, hematuria.

## 2019-11-21 NOTE — H&P CARDIOLOGY - FAMILY HISTORY
Family history of early CAD     Mother  Still living? No  Family history of breast cancer in mother, Age at diagnosis: Age Unknown  Family history of heart attack, Age at diagnosis: Age Unknown  Family history of colon cancer in mother, Age at diagnosis: Age Unknown

## 2019-11-21 NOTE — CHART NOTE - NSCHARTNOTEFT_GEN_A_CORE
Type of procedure: PVI  Licensed independent practitioner: Osiel Potter MD  Assistant: None  Description of procedure: After informed consent was obtained, the patient was brought to the Electrophysiology laboratory in the postabsorptive state, and was prepped and draped in the usual sterile fashion. The patient was electively intubated by an anesthesiologist, who provided general anesthesia throughout the case. In addition an esophageal temperature probe was placed into the esophagus to allow temperate monitoring during ablation. Under sterile conditions, femoral venous access was obtained and catheters advanced to the right atrium under fluoroscopic guidance without complications.  Heparin 10,000 units followed by 2000 unit/hour drip was administered to maintain an ACT of 300-400 seconds throughout the case. An endocardial shell of the left atrium was created using ICE guidance and the pulmonary veins, left atrial appendage and esophagus were tagged.  Transeptal access was achieved using fluoroscopic and ICE guidance using an 8.5 Agilis Sheath and C1 Rome needle. The mean left atrial pressure was 24 mm Hg.  A PentaRay Clinton F curve catheter and a 3.5 mm irrigated-tip Navistar ThermoCool ST SF DF-curve ablation catheter were used for mapping and ablation. A FAM and voltage map of the LA were created using a Carto 3D mapping system.  Ablation was not performed around the anterior portion of the right pulmonary veins. Ablation was carried out at the PV antra with careful attention to avoid ablation within the pulmonary veins. Mapping showed low voltage areas around the posterior portion of the right inferior pulmonary vein and small patchy areas of the posterior wall; otherwise the RSPV, LSPV, and LIPV had been isolated previously. Entrance and exit block of the pulmonary veins remained for >30 minutes, without evidence of acute reconnection. An EP study was performed and demonstrated an atrial tachycardia originating from the superior anterior septal portion of the RA, several centimeters superior to the His bundle. Ablation was carried out at the site of earliest atrial activation during tachycardia and tachycardia terminated.  Post ablation there were no inducible atrial arrhythmias despite up to 3 atrial extrastimuli and burst atrial and ventricular pacing with and without dobutamine 20 mkm.  The catheters were removed from the left atrium, and heparin was discontinued and reversed with protamine 50 mg. Repeat ICE imaging revealed no significant pericardial effusion. All catheters and sheaths were removed and hemostasis achieved and at least 20 minutes of holding direct pressure to the access sites. The patient tolerated the procedure well and was successfully extubated and transferred to the CCU in stable condition.   Findings of procedure: Successful isolation of all four pulmonary veins and atrial tachycardia.  Estimated blood loss: <10 cc  Specimen removed: none  Preoperative Dx: Afib and RA AT  Postoperative Dx: Afib and RA AT  Complications: None  Anesthesia type: General    Osiel Potter MD

## 2019-11-21 NOTE — DISCHARGE NOTE PROVIDER - HOSPITAL COURSE
79F with AS, HTN, and s/p bovine MVR in 2007 c/b post op afib now with recently diagnosed AT in 3/2019 s/p DCCV and s/p AT ablation in 4/2019 presents with recurrent palpitations and AT, unresponsive to increasing doses of metoprolol. Now s/p successful AT ablation and ILR implant. EP lab course c/b by low BP requiring IVF challenge X 2. Also bradycardic post procedure, beta blockers on hold. 79F with AS, HTN, and s/p bovine MVR in 2007 c/b post op afib now with recently diagnosed AT in 3/2019 s/p DCCV and s/p AT ablation in 4/2019 presents with recurrent palpitations and AT, unresponsive to increasing doses of metoprolol. Now s/p successful AT ablation and ILR implant. EP lab course c/b by low BP requiring IVF challenge X 2. Also bradycardic post procedure, beta blockers on hold. Stable at present in CCU. 79F with AS, HTN, and s/p bovine MVR in 2007 c/b post op afib now with recently diagnosed AT in 3/2019 s/p DCCV and s/p AT ablation in 4/2019 presents with recurrent palpitations and AT, unresponsive to increasing doses of metoprolol. Now s/p successful AT ablation and ILR implant. EP lab course c/b by low BP requiring IVF challenge X 2. Also bradycardic post procedure, beta blockers on hold. Stable at present in CCU.  Per EP, resume home lopressor dose, do NOT continue warfarin. 79F with AS, HTN, and s/p bovine MVR in 2007 c/b post op afib now with recently diagnosed AT in 3/2019 s/p DCCV and s/p AT ablation in 4/2019 presents with recurrent palpitations and AT, unresponsive to increasing doses of metoprolol. Now s/p successful AT ablation and ILR implant. EP lab course c/b by low BP requiring IVF challenge X 2. Also bradycardic post procedure, beta blockers on hold. Stable at present in CCU.  Per EP, resume home lopressor dose, do NOT continue warfarin.  Please take protonix once per day.

## 2019-11-21 NOTE — DISCHARGE NOTE PROVIDER - CARE PROVIDER_API CALL
Osiel Potter (MD)  Cardiac Electrophysiology; Cardiovascular Disease; Internal Medicine  [address] [address] [place] [address], [address] [place] [address]  Phone: [phone]  Fax: [phone]  Follow Up Time: 2 weeks

## 2019-11-21 NOTE — CHART NOTE - NSCHARTNOTEFT_GEN_A_CORE
CCU Accept Note    PAVITHRA TRAN  79y  Patient is a 79y old  Female who presents with a chief complaint of     ====================  HPI & Hospital Course:     79F with AS, HTN, s/p bovine MVR in 2007 with post op afib, and recently diagnosed AT in 3/2019, s/p DCCV and AT ablation in 4/2019 presents with recurrent palpitations and AT unresponsive to increased metoprolol. Patient now s/p elective AT ablation and ILR placement.     Past Medical History:     afib  AS  HTN    Past Surgical History:     cardiac cath 2007  hyterectomy and oophorectomy  MVR replacement  tonsillectomy    Home Medications:  Aspirin Enteric Coated 81 mg oral delayed release tablet: 1 tab(s) orally once a day (21 Nov 2019 14:24)  Calcium 500+D oral tablet, chewable: 1 tab(s) orally once a day (21 Nov 2019 07:39)  Lopressor 100 mg oral tablet: 1 tab(s) orally once a day (at bedtime) (21 Nov 2019 14:24)  Lopressor 50 mg oral tablet: 1.5 tab(s) orally once a  in AM (21 Nov 2019 14:24)      Current Medications:   MEDICATIONS  (STANDING):  chlorhexidine 4% Liquid 1 Application(s) Topical daily  sodium chloride 0.9% lock flush 3 milliLiter(s) IV Push every 8 hours    MEDICATIONS  (PRN):      Allergies:     adhesives (hives)    Family History:     CAD  MI  breast CA mother     Social History:     no alcohol or tobacco use    ====================  ICU Vital Signs Last 24 Hrs  T(C): --  T(F): --  HR: --  BP: --  BP(mean): --  ABP: --  ABP(mean): --  RR: --  SpO2: --      Adult Advanced Hemodynamics Last 24 Hrs  CVP(mm Hg): --  CVP(cm H2O): --  CO: --  CI: --  PA: --  PA(mean): --  PCWP: -- 29 after procedure  SVR: --  SVRI: --  PVR: --  PVRI: --    Physical Exam:   General: NAD; tired appearing  HEENT: PERRL, EOMI, normal sclera and conjunctiva, no oral lesions  Neck: Supple, no JVD  Lungs: CTA bilaterally  Heart: RRR, normal S1S2, no murmurs/rubs/gallops  Abdomen: Soft, ND/NT  Extremities: 1+ peripheral pulses, no cyanosis/clubbing/edema  Skin: Warm, well-perfused  Neuro: A&O x3, no focal deficits      ====================  Labs & Imaging:               ====================  Assessment & Plan:     79 year old female PMH AS, afib w/ atrial tachycardia, and HTN presenting for AT ablation and ILD placement    #AT/afib, s/p ablation  -patient hypotensive upon arrival to floor  -giving bolus of NS, will continue to monitor  -stat TTE to r/o cardiac tamponade  -will get CBC, CMP    #HTN  -holding home lopressor in setting of hypotension, will resume as tolerated      ==================== CCU Accept Note    PAVITHRA TRAN  79y  Patient is a 79y old  Female who presents with a chief complaint of     ====================  HPI & Hospital Course:     79F with AS, HTN, s/p bovine MVR in 2007 with post op afib, and recently diagnosed AT in 3/2019, s/p DCCV and AT ablation in 4/2019 presents with recurrent palpitations and AT unresponsive to increased metoprolol. Patient now s/p elective AT ablation and ILR placement.     Past Medical History:     afib  AS  HTN    Past Surgical History:     cardiac cath 2007  hyterectomy and oophorectomy  MVR replacement  tonsillectomy    Home Medications:  Aspirin Enteric Coated 81 mg oral delayed release tablet: 1 tab(s) orally once a day (21 Nov 2019 14:24)  Calcium 500+D oral tablet, chewable: 1 tab(s) orally once a day (21 Nov 2019 07:39)  Lopressor 100 mg oral tablet: 1 tab(s) orally once a day (at bedtime) (21 Nov 2019 14:24)  Lopressor 50 mg oral tablet: 1.5 tab(s) orally once a  in AM (21 Nov 2019 14:24)      Current Medications:   MEDICATIONS  (STANDING):  chlorhexidine 4% Liquid 1 Application(s) Topical daily  sodium chloride 0.9% lock flush 3 milliLiter(s) IV Push every 8 hours    MEDICATIONS  (PRN):      Allergies:     adhesives (hives)    Family History:     CAD  MI  breast CA mother     Social History:     no alcohol or tobacco use    ====================  ICU Vital Signs Last 24 Hrs  T(C): --  T(F): --  HR: --  BP: --  BP(mean): --  ABP: --  ABP(mean): --  RR: --  SpO2: --      Adult Advanced Hemodynamics Last 24 Hrs  CVP(mm Hg): --  CVP(cm H2O): --  CO: --  CI: --  PA: --  PA(mean): --  PCWP: -- 29 after procedure  SVR: --  SVRI: --  PVR: --  PVRI: --    Physical Exam:   General: NAD; tired appearing  HEENT: PERRL, EOMI, normal sclera and conjunctiva, no oral lesions  Neck: Supple, no JVD  Lungs: CTA bilaterally  Heart: RRR, normal S1S2, no murmurs/rubs/gallops  Abdomen: Soft, ND/NT  Extremities: 1+ peripheral pulses, no cyanosis/clubbing/edema  Skin: Warm, well-perfused  Neuro: A&O x3, no focal deficits      ====================  Labs & Imaging:               ====================  Assessment & Plan:     79 year old female PMH AS, afib w/ atrial tachycardia, and HTN presenting for AT ablation and ILD placement    #AT/afib, s/p ablation  -patient hypotensive upon arrival to floor  -giving bolus of NS, will continue to monitor  -holding lopressor  -stat TTE to r/o cardiac tamponade  -will get CBC, CMP    #HTN  -holding home lopressor in setting of hypotension, will resume as tolerated    #AS/MVR  -holding warfarin, will resume as tolerated      ==================== CCU Accept Note    PAVITHRA TRAN  79y  Patient is a 79y old  Female who presents with a chief complaint of     ====================  HPI & Hospital Course:     79F with AS, HTN, s/p bovine MVR in 2007 with post op afib, and recently diagnosed AT in 3/2019, s/p DCCV and AT ablation in 4/2019 presents with recurrent palpitations and AT unresponsive to increased metoprolol. Patient now s/p elective AT ablation and ILR placement.     Past Medical History:     afib  AS  HTN    Past Surgical History:     cardiac cath 2007  hyterectomy and oophorectomy  MVR replacement  tonsillectomy    Home Medications:  Aspirin Enteric Coated 81 mg oral delayed release tablet: 1 tab(s) orally once a day (21 Nov 2019 14:24)  Calcium 500+D oral tablet, chewable: 1 tab(s) orally once a day (21 Nov 2019 07:39)  Lopressor 100 mg oral tablet: 1 tab(s) orally once a day (at bedtime) (21 Nov 2019 14:24)  Lopressor 50 mg oral tablet: 1.5 tab(s) orally once a  in AM (21 Nov 2019 14:24)      Current Medications:   MEDICATIONS  (STANDING):  chlorhexidine 4% Liquid 1 Application(s) Topical daily  sodium chloride 0.9% lock flush 3 milliLiter(s) IV Push every 8 hours    MEDICATIONS  (PRN):      Allergies:     adhesives (hives)    Family History:     CAD  MI  breast CA mother     Social History:     no alcohol or tobacco use    ====================  ICU Vital Signs Last 24 Hrs  T(C): --  T(F): --  HR: --  BP: --  BP(mean): --  ABP: --  ABP(mean): --  RR: --  SpO2: --      Adult Advanced Hemodynamics Last 24 Hrs  CVP(mm Hg): --  CVP(cm H2O): --  CO: --  CI: --  PA: --  PA(mean): --  PCWP: -- 29 after procedure  SVR: --  SVRI: --  PVR: --  PVRI: --    Physical Exam:   General: NAD; tired appearing  HEENT: PERRL, EOMI, normal sclera and conjunctiva, no oral lesions  Neck: Supple, no JVD  Lungs: CTA bilaterally  Heart: RRR, normal S1S2, no murmurs/rubs/gallops  Abdomen: Soft, ND/NT  Extremities: 1+ peripheral pulses, no cyanosis/clubbing/edema  Skin: Warm, well-perfused  Neuro: A&O x3, no focal deficits      ====================  Labs & Imaging:               ====================  Assessment & Plan:     79 year old female PMH AS, afib w/ atrial tachycardia, and HTN presenting for AT ablation and ILD placement    #AT/afib, s/p ablation  -patient hypotensive upon arrival to floor  -giving bolus of NS, will continue to monitor  -holding lopressor  -stat TTE to r/o cardiac tamponade  -if afib persists, will consider resuming anti-coagulation  -will get CBC, CMP    #HTN  -holding home lopressor in setting of hypotension, will resume as tolerated    #AS/MVR  -b/c MVR repair not mechanical, warfarin not indicated    #PPX  -Diet: DASH  -DVT: holding hep in setting of recent procedure      ==================== JOCE, intubated pt, RASS -3

## 2019-11-21 NOTE — H&P CARDIOLOGY - PSH
BCC (basal cell carcinoma)  removed from right neck  S/P cardiac catheterization  2007  S/P hysterectomy with oophorectomy    S/P MVR (mitral valve replacement)  2007 - bovine, 2017 bovine  S/P tonsillectomy

## 2019-11-21 NOTE — CHART NOTE - NSCHARTNOTEFT_GEN_A_CORE
Type of Procedure: Implantable Loop Recorder Implant  Licensed independent practitioner: Osiel Potter MD  Assistant: none  Description of procedure: sterile conditions, antibiotic coverage, ILR implanted 4th ICS left parasternal area  Findings of procedure: Normal sensing  Estimated blood loss: < 10 cc  Specimen removed: none  Preoperative Dx: monitor for AF/AT  Postoperative Dx: monitor for AF/AT  Complications: none  Anesthesia type: local anesthesia    Osiel Potter MD

## 2019-11-21 NOTE — DISCHARGE NOTE PROVIDER - NSDCMRMEDTOKEN_GEN_ALL_CORE_FT
Aspirin Enteric Coated 81 mg oral delayed release tablet: 1 tab(s) orally once a day  Calcium 500+D oral tablet, chewable: 1 tab(s) orally once a day  Lopressor 100 mg oral tablet: 1 tab(s) orally once a day (at bedtime)  Lopressor 50 mg oral tablet: 1.5 tab(s) orally once a  in AM Aspirin Enteric Coated 81 mg oral delayed release tablet: 1 tab(s) orally once a day  Calcium 500+D oral tablet, chewable: 1 tab(s) orally once a day  Lopressor 100 mg oral tablet: 1 tab(s) orally once a day (at bedtime)  Lopressor 50 mg oral tablet: 1.5 tab(s) orally once a  in AM  Protonix 40 mg oral delayed release tablet: 1 tab(s) orally once a day

## 2019-11-21 NOTE — DISCHARGE NOTE PROVIDER - NSDCACTIVITY_GEN_ALL_CORE
Stairs allowed/Showering allowed/Walking - Indoors allowed/Walking - Outdoors allowed/Bathing allowed/No heavy lifting/straining

## 2019-11-21 NOTE — DISCHARGE NOTE PROVIDER - NSDCCPCAREPLAN_GEN_ALL_CORE_FT
PRINCIPAL DISCHARGE DIAGNOSIS  Diagnosis: Atrial tachycardia  Assessment and Plan of Treatment: PRINCIPAL DISCHARGE DIAGNOSIS  Diagnosis: Atrial tachycardia  Assessment and Plan of Treatment: you had an ablation for this.  please follow up with electrophysiology on 12/13 PRINCIPAL DISCHARGE DIAGNOSIS  Diagnosis: Atrial tachycardia  Assessment and Plan of Treatment: you had an ablation for this. please continue to take your home lopressor dose.  do NOT take warfarin.  please follow up with electrophysiology on 12/13 PRINCIPAL DISCHARGE DIAGNOSIS  Diagnosis: Atrial tachycardia  Assessment and Plan of Treatment: you had an ablation for this. please continue to take your home lopressor dose.  do NOT take warfarin.  please take protonix once a day for 30 days. please follow up with electrophysiology on 12/13

## 2019-11-21 NOTE — H&P CARDIOLOGY - HISTORY OF PRESENT ILLNESS
79 y.o. female presents today for elective AT ablation and ILR implant.   The patient is s/p newly diagnosed A.FIb, s/p DCCV on 3/15/19 and PVI and AT ablation on 4/11/19. The patient still c/o episodes of palpitations, duration from 4hrs to 9 hrs, mostly at night. She was recommended to increase Metoprolol to 75 mg in AM and 100 mg in PM. The patient denies chest pain, SOB, dizziness, presyncope, syncope, b/l lower  extremities edema, abdominal pain, N/V/D/C, melena, hematochezia, h/o DVT, PE, STACY, fever, chills, urinary symptoms, hematuria.

## 2019-11-21 NOTE — DISCHARGE NOTE PROVIDER - NSDCFUADDAPPT_GEN_ALL_CORE_FT
-please follow up at the device clinic at 12:15 PM followed by an appointment with Dr. Potter at 12:30 (same [facility] [address])

## 2019-11-21 NOTE — DISCHARGE NOTE PROVIDER - NSDCFUSCHEDAPPT_GEN_ALL_CORE_FT
PAVITHRA TRAN ; 12/13/2019 ; NPP Cardio [facility] [id number] PAVITHRA TRAN ; 12/13/2019 ; NPP [facility]io [facility] [id number]  PAVITHRA TRAN ; 12/13/2019 ; NP Cardio Electro [id number]

## 2019-11-22 ENCOUNTER — TRANSCRIPTION ENCOUNTER (OUTPATIENT)
Age: 79
End: 2019-11-22

## 2019-11-22 VITALS
OXYGEN SATURATION: 97 % | DIASTOLIC BLOOD PRESSURE: 46 MMHG | HEART RATE: 84 BPM | SYSTOLIC BLOOD PRESSURE: 114 MMHG | RESPIRATION RATE: 12 BRPM

## 2019-11-22 PROBLEM — K59.00 CONSTIPATION, UNSPECIFIED: Chronic | Status: ACTIVE | Noted: 2019-11-21

## 2019-11-22 PROBLEM — I00 RHEUMATIC FEVER WITHOUT HEART INVOLVEMENT: Chronic | Status: ACTIVE | Noted: 2019-11-21

## 2019-11-22 PROBLEM — H91.90 UNSPECIFIED HEARING LOSS, UNSPECIFIED EAR: Chronic | Status: ACTIVE | Noted: 2019-11-21

## 2019-11-22 LAB
ALBUMIN SERPL ELPH-MCNC: 3.4 G/DL — SIGNIFICANT CHANGE UP (ref 3.3–5)
ALP SERPL-CCNC: 56 U/L — SIGNIFICANT CHANGE UP (ref 40–120)
ALT FLD-CCNC: 16 U/L — SIGNIFICANT CHANGE UP (ref 4–33)
ANION GAP SERPL CALC-SCNC: 11 MMO/L — SIGNIFICANT CHANGE UP (ref 7–14)
AST SERPL-CCNC: 39 U/L — HIGH (ref 4–32)
BASOPHILS # BLD AUTO: 0.04 K/UL — SIGNIFICANT CHANGE UP (ref 0–0.2)
BASOPHILS NFR BLD AUTO: 0.4 % — SIGNIFICANT CHANGE UP (ref 0–2)
BILIRUB SERPL-MCNC: 1 MG/DL — SIGNIFICANT CHANGE UP (ref 0.2–1.2)
BUN SERPL-MCNC: 14 MG/DL — SIGNIFICANT CHANGE UP (ref 7–23)
CALCIUM SERPL-MCNC: 8.5 MG/DL — SIGNIFICANT CHANGE UP (ref 8.4–10.5)
CHLORIDE SERPL-SCNC: 102 MMOL/L — SIGNIFICANT CHANGE UP (ref 98–107)
CO2 SERPL-SCNC: 21 MMOL/L — LOW (ref 22–31)
CREAT SERPL-MCNC: 0.75 MG/DL — SIGNIFICANT CHANGE UP (ref 0.5–1.3)
EOSINOPHIL # BLD AUTO: 0.1 K/UL — SIGNIFICANT CHANGE UP (ref 0–0.5)
EOSINOPHIL NFR BLD AUTO: 1 % — SIGNIFICANT CHANGE UP (ref 0–6)
GLUCOSE SERPL-MCNC: 86 MG/DL — SIGNIFICANT CHANGE UP (ref 70–99)
HCT VFR BLD CALC: 36 % — SIGNIFICANT CHANGE UP (ref 34.5–45)
HGB BLD-MCNC: 11.2 G/DL — LOW (ref 11.5–15.5)
IMM GRANULOCYTES NFR BLD AUTO: 0.5 % — SIGNIFICANT CHANGE UP (ref 0–1.5)
LYMPHOCYTES # BLD AUTO: 1.57 K/UL — SIGNIFICANT CHANGE UP (ref 1–3.3)
LYMPHOCYTES # BLD AUTO: 15 % — SIGNIFICANT CHANGE UP (ref 13–44)
MAGNESIUM SERPL-MCNC: 2.3 MG/DL — SIGNIFICANT CHANGE UP (ref 1.6–2.6)
MCHC RBC-ENTMCNC: 27.5 PG — SIGNIFICANT CHANGE UP (ref 27–34)
MCHC RBC-ENTMCNC: 31.1 % — LOW (ref 32–36)
MCV RBC AUTO: 88.2 FL — SIGNIFICANT CHANGE UP (ref 80–100)
MONOCYTES # BLD AUTO: 1.22 K/UL — HIGH (ref 0–0.9)
MONOCYTES NFR BLD AUTO: 11.7 % — SIGNIFICANT CHANGE UP (ref 2–14)
NEUTROPHILS # BLD AUTO: 7.47 K/UL — HIGH (ref 1.8–7.4)
NEUTROPHILS NFR BLD AUTO: 71.4 % — SIGNIFICANT CHANGE UP (ref 43–77)
NRBC # FLD: 0 K/UL — SIGNIFICANT CHANGE UP (ref 0–0)
PHOSPHATE SERPL-MCNC: 2.8 MG/DL — SIGNIFICANT CHANGE UP (ref 2.5–4.5)
PLATELET # BLD AUTO: 231 K/UL — SIGNIFICANT CHANGE UP (ref 150–400)
PMV BLD: 11.4 FL — SIGNIFICANT CHANGE UP (ref 7–13)
POTASSIUM SERPL-MCNC: 5 MMOL/L — SIGNIFICANT CHANGE UP (ref 3.5–5.3)
POTASSIUM SERPL-SCNC: 5 MMOL/L — SIGNIFICANT CHANGE UP (ref 3.5–5.3)
PROT SERPL-MCNC: 6.2 G/DL — SIGNIFICANT CHANGE UP (ref 6–8.3)
RBC # BLD: 4.08 M/UL — SIGNIFICANT CHANGE UP (ref 3.8–5.2)
RBC # FLD: 14.6 % — HIGH (ref 10.3–14.5)
SODIUM SERPL-SCNC: 134 MMOL/L — LOW (ref 135–145)
WBC # BLD: 10.45 K/UL — SIGNIFICANT CHANGE UP (ref 3.8–10.5)
WBC # FLD AUTO: 10.45 K/UL — SIGNIFICANT CHANGE UP (ref 3.8–10.5)

## 2019-11-22 RX ORDER — PANTOPRAZOLE SODIUM 20 MG/1
1 TABLET, DELAYED RELEASE ORAL
Qty: 30 | Refills: 0
Start: 2019-11-22 | End: 2019-12-21

## 2019-11-22 RX ADMIN — SODIUM CHLORIDE 3 MILLILITER(S): 9 INJECTION INTRAMUSCULAR; INTRAVENOUS; SUBCUTANEOUS at 05:19

## 2019-11-22 RX ADMIN — Medication 81 MILLIGRAM(S): at 12:43

## 2019-11-22 NOTE — PROGRESS NOTE ADULT - ASSESSMENT
This is a 77 yo female with past medical history of hypertension, rheumatic heart disease, aortic stenosis, MVR x2 (2007/2017) with post op atrial fibrillation and atrial tachycardia s/p  DCCV on 3/15/2019 and PVI and AT ablation on 4/11/2019.  Her LVEF is normal.  Off Coumadin. Recently developed recurrent symptomatic atrial tachycardia. Now s/p ablation of an ectopic atrial tachycardia and implantation of an ILR.  Tolerated the procedure well. No complications. No events overnight.  Post procedure ATach ablation and ILR teaching done with patient and her family.   Written instructions and contact information provided.   Labs reviewed and stable.   To continue on home medications including metoprolol.  Dosages will be adjusted as an outpatient.  No need for anticoagulation but continue low dose aspirin.  She has a follow-up appointment in the device  clinic on 12/13/2019 at 12:15pm followed by an appointment with Dr. Potter at 12:30pm  [address] [facility]  [phone].
79 year old female PMH AS, afib w/ atrial tachycardia, and HTN presenting for AT ablation and ILD placement    #AT/afib, s/p ablation  -patient hypotensive upon arrival to floor, has been improving w/ NS boluses  -holding lopressor  -TTE no evidence of effusion  -if afib persists, will consider resuming anti-coagulation    #HTN  -holding home lopressor in setting of hypotension, will resume as tolerated    #AS/MVR  -b/c MVR repair not mechanical, warfarin not indicated    #PPX  -Diet: DASH  -DVT: holding hep in setting of recent procedure

## 2019-11-22 NOTE — DISCHARGE NOTE NURSING/CASE MANAGEMENT/SOCIAL WORK - NSDCFUADDAPPT_GEN_ALL_CORE_FT
-please follow up at the device clinic at 12:15 PM followed by an appointment with Dr. Potter at 12:30 (same [facility])

## 2019-11-22 NOTE — PROGRESS NOTE ADULT - SUBJECTIVE AND OBJECTIVE BOX
PATIENT:  PAVITHRA TRAN  [id number]    CHIEF COMPLAINT:  Patient is a 79y old  Female who presents with a chief complaint of     INTERVAL HISTORY/OVERNIGHT EVENTS:    [employer], patient hypotensive w/ SBP in 70s --> received additional 250 NS bolus with increase in bp.  Denies pain in R groin access site.  Denies CP, SOB, palpitations, fever/chills.    REVIEW OF SYSTEMS:    Constitutional:     [ ] negative [ ] fevers [ ] chills [ ] weight loss [ ] weight gain  HEENT:                  [ ] negative [ ] dry eyes [ ] eye irritation [ ] postnasal drip [ ] nasal congestion  CV:                         [ ] negative  [ ] chest pain [ ] orthopnea [ ] palpitations [ ] murmur  Resp:                     [ ] negative [ ] cough [ ] shortness of breath [ ] dyspnea [ ] wheezing [ ] sputum [ ] hemoptysis  GI:                          [ ] negative [ ] nausea [ ] vomiting [ ] diarrhea [ ] constipation [ ] abd pain [ ] dysphagia   :                        [ ] negative [ ] dysuria [ ] nocturia [ ] hematuria [ ] increased urinary frequency  Musculoskeletal: [ ] negative [ ] back pain [ ] myalgias [ ] arthralgias [ ] fracture  Skin:                       [ ] negative [ ] rash [ ] itch  Neurological:        [ ] negative [ ] headache [ ] dizziness [ ] syncope [ ] weakness [ ] numbness  Psychiatric:           [ ] negative [ ] anxiety [ ] depression  Endocrine:            [ ] negative [ ] diabetes [ ] thyroid problem  Heme/Lymph:      [ ] negative [ ] anemia [ ] bleeding problem  Allergic/Immune: [ ] negative [ ] itchy eyes [ ] nasal discharge [ ] hives [ ] angioedema    [x] All other systems negative  [ ] Unable to assess ROS because ________.    MEDICATIONS:  MEDICATIONS  (STANDING):  aspirin enteric coated 81 milliGRAM(s) Oral daily  chlorhexidine 4% Liquid 1 Application(s) Topical daily  sodium chloride 0.9% lock flush 3 milliLiter(s) IV Push every 8 hours    MEDICATIONS  (PRN):      ALLERGIES:  Allergies    adhesives (Urticaria)  No Known Drug Allergies    Intolerances    [facility] PT (Unknown)      OBJECTIVE:  ICU Vital Signs Last 24 Hrs  T(C): 36.9 (2019 04:00), Max: 37.3 (2019 14:30)  T(F): 98.4 (2019 04:00), Max: 99.1 (2019 14:30)  HR: 75 (2019 06:00) (59 - 75)  BP: 107/48 (2019 06:00) (80/35 - 107/48)  BP(mean): 62 (2019 06:00) (46 - 62)  ABP: --  ABP(mean): --  RR: 13 (2019 06:00) (10 - 17)  SpO2: 95% (2019 06:00) (94% - 100%)      Adult Advanced Hemodynamics Last 24 Hrs  CVP(mm Hg): --  CVP(cm H2O): --  CO: --  CI: --  PA: --  PA(mean): --  PCWP: --  SVR: --  SVRI: --  PVR: --  PVRI: --  CAPILLARY BLOOD GLUCOSE        CAPILLARY BLOOD GLUCOSE        I&O's Summary    2019 07:01  -  2019 07:00  --------------------------------------------------------  IN: 1690 mL / OUT: 800 mL / NET: 890 mL      Daily     Daily Weight in k (2019 06:00)    PHYSICAL EXAMINATION:  General: WN/WD NAD  HEENT: PERRLA, EOMI, moist mucous membranes  Neurology: A&Ox3, nonfocal, NEFF x 4  Respiratory: CTA B/L, normal respiratory effort, no wheezes, crackles, rales  CV: RRR, S1S2, no murmurs, rubs or gallops  Abdominal: Soft, NT, ND +BS, Last BM  Extremities: No edema, + peripheral pulses  Incisions: R groin c/d/i  Tubes:    LABS:                          11.2   10.45 )-----------( 231      ( 2019 05:00 )             36.0     11-22    134<L>  |  102  |  14  ----------------------------<  86  5.0   |  21<L>  |  0.75    Ca    8.5      2019 05:00  Phos  2.8     11-22  Mg     2.3         TPro  6.2  /  Alb  3.4  /  TBili  1.0  /  DBili  x   /  AST  39<H>  /  ALT  16  /  AlkPhos  56      LIVER FUNCTIONS - ( 2019 05:00 )  Alb: 3.4 g/dL / Pro: 6.2 g/dL / ALK PHOS: 56 u/L / ALT: 16 u/L / AST: 39 u/L / GGT: x           PT/INR - ( 2019 15:35 )   PT: 13.0 SEC;   INR: 1.17          PTT - ( 2019 15:35 )  PTT:30.0 SEC            TELEMETRY:     EKG:     IMAGING:

## 2019-11-22 NOTE — DISCHARGE NOTE NURSING/CASE MANAGEMENT/SOCIAL WORK - PATIENT PORTAL LINK FT
You can access the FollowMyHealth Patient Portal offered by [facility] by registering at the following website: [email or web address]. By joining [employer]’s FollowMyHealth portal, you will also be able to view your health information using other applications (apps) compatible with our system.

## 2019-11-22 NOTE — PROGRESS NOTE ADULT - SUBJECTIVE AND OBJECTIVE BOX
Patient sitting up in chair.  Denies chest pain, shortness of breath, palpitations or lightheadedness overnight.  Feels much more awake and alert this morning.  Voiding without difficulty.  Ambulated to the bathroom without assistance. No groin pain.     Vital Signs Last 24 Hrs  T(C): 36.8 (22 Nov 2019 08:00), Max: 37.3 (21 Nov 2019 14:30)  T(F): 98.2 (22 Nov 2019 08:00), Max: 99.1 (21 Nov 2019 14:30)  HR: 84 (22 Nov 2019 09:00) (59 - 84)  BP: 114/46 (22 Nov 2019 09:00) (80/35 - 114/46)  BP(mean): 61 (22 Nov 2019 09:00) (46 - 62)  RR: 12 (22 Nov 2019 09:00) (10 - 17)  SpO2: 97% (22 Nov 2019 09:00) (94% - 100%)      EKG  Telemetry:  Normal sinus rhythm  60-80's.  No events overnight  MEDICATIONS  (STANDING):  aspirin enteric coated 81 milliGRAM(s) Oral daily  chlorhexidine 4% Liquid 1 Application(s) Topical daily  sodium chloride 0.9% lock flush 3 milliLiter(s) IV Push every 8 hours    MEDICATIONS  (PRN):          Physical exam:   Gen- appears awake, alert comfortable in NAD  Resp- clear to auscultation.  No wheezing, rales or rhonchi  CV- S1 and S2 RRR. No murmurs, gallops or rubs.  ILR dressing dry and intact.  No pain at the site.  ABD- soft nontender + bowel sounds  EXT- right groin nontender No bleeding, hematoma or ecchymosis. Pedal pulses strong and equal.  Neuro- grossly nonfocal                            11.2   10.45 )-----------( 231      ( 22 Nov 2019 05:00 )             36.0     PT/INR - ( 21 Nov 2019 15:35 )   PT: 13.0 SEC;   INR: 1.17          PTT - ( 21 Nov 2019 15:35 )  PTT:30.0 SEC  11-22    134<L>  |  102  |  14  ----------------------------<  86  5.0   |  21<L>  |  0.75    Ca    8.5      22 Nov 2019 05:00  Phos  2.8     11-22  Mg     2.3     11-22    TPro  6.2  /  Alb  3.4  /  TBili  1.0  /  DBili  x   /  AST  39<H>  /  ALT  16  /  AlkPhos  56  11-22

## 2019-12-13 ENCOUNTER — NON-APPOINTMENT (OUTPATIENT)
Age: 79
End: 2019-12-13

## 2019-12-13 ENCOUNTER — APPOINTMENT (OUTPATIENT)
Dept: ELECTROPHYSIOLOGY | Facility: CLINIC | Age: 79
End: 2019-12-13
Payer: MEDICARE

## 2019-12-13 VITALS
OXYGEN SATURATION: 99 % | DIASTOLIC BLOOD PRESSURE: 61 MMHG | BODY MASS INDEX: 19.16 KG/M2 | SYSTOLIC BLOOD PRESSURE: 137 MMHG | HEART RATE: 67 BPM | WEIGHT: 115 LBS | HEIGHT: 65 IN

## 2019-12-13 PROCEDURE — 93285 PRGRMG DEV EVAL SCRMS IP: CPT

## 2019-12-13 PROCEDURE — 93000 ELECTROCARDIOGRAM COMPLETE: CPT | Mod: 59

## 2019-12-13 PROCEDURE — 99024 POSTOP FOLLOW-UP VISIT: CPT

## 2019-12-13 RX ORDER — ASPIRIN 81 MG/1
81 TABLET ORAL DAILY
Refills: 0 | Status: ACTIVE | COMMUNITY

## 2019-12-13 NOTE — PHYSICAL EXAM
[General Appearance - Well Developed] : well developed [Normal Appearance] : normal appearance [General Appearance - Well Nourished] : well nourished [Well Groomed] : well groomed [No Deformities] : no deformities [Eyelids - No Xanthelasma] : the eyelids demonstrated no xanthelasmas [General Appearance - In No Acute Distress] : no acute distress [Normal Conjunctiva] : the conjunctiva exhibited no abnormalities [Normal Oral Mucosa] : normal oral mucosa [No Oral Cyanosis] : no oral cyanosis [No Oral Pallor] : no oral pallor [Normal Jugular Venous A Waves Present] : normal jugular venous A waves present [No Jugular Venous Giron A Waves] : no jugular venous giron A waves [Normal Jugular Venous V Waves Present] : normal jugular venous V waves present [Exaggerated Use Of Accessory Muscles For Inspiration] : no accessory muscle use [Auscultation Breath Sounds / Voice Sounds] : lungs were clear to auscultation bilaterally [Respiration, Rhythm And Depth] : normal respiratory rhythm and effort [Heart Rate And Rhythm] : heart rate and rhythm were normal [Murmurs] : no murmurs present [Heart Sounds] : normal S1 and S2 [Abdomen Tenderness] : non-tender [Abdomen Soft] : soft [Gait - Sufficient For Exercise Testing] : the gait was sufficient for exercise testing [Abnormal Walk] : normal gait [Abdomen Mass (___ Cm)] : no abdominal mass palpated [Nail Clubbing] : no clubbing of the fingernails [Petechial Hemorrhages (___cm)] : no petechial hemorrhages [Cyanosis, Localized] : no localized cyanosis [Skin Color & Pigmentation] : normal skin color and pigmentation [] : no rash [Skin Lesions] : no skin lesions [No Venous Stasis] : no venous stasis [Oriented To Time, Place, And Person] : oriented to person, place, and time [No Xanthoma] : no  xanthoma was observed [No Skin Ulcers] : no skin ulcer [No Anxiety] : not feeling anxious [Mood] : the mood was normal [Affect] : the affect was normal

## 2019-12-14 NOTE — REASON FOR VISIT
[Follow-Up - Clinic] : a clinic follow-up of [Palpitations] : palpitations [FreeTextEntry1] : s/p AT ablation

## 2019-12-14 NOTE — DISCUSSION/SUMMARY
[FreeTextEntry1] : In summary Ms. Carlos is an 80y/o woman with Hx of HTN, aortic stenosis, rheumatic heart disease, MVR x2 2007/2017 c/b post-op afib, and newly diagnosed AT s/p DCCV on 3/15/2019 w/ PVI and AT ablation (4 ATs noted) on 4/11/2019, remains off Coumadin, who recently underwent an AT ablation (5th AT, anteroseptal RA) w/ ILR placement for which patient presents here today for a f/u visit.  Patient states she feels well with no complaints today.  States compliance on all medications including Metoprolol which was increased at last visit prior to ablation.  Patient denies chest pain, palpitations, sob/gudino, lightheadedness, dizziness and syncope/near syncope. ILR showed no significant arrhythmias. I recommended that we attempt to taper her off beta blocker and follow-up as needed. She will reduce metoprolol to 75 mg PO BID. \par \par Sincerely,\par \par Osiel Potter MD

## 2019-12-14 NOTE — HISTORY OF PRESENT ILLNESS
[FreeTextEntry1] : Amber Guerrero MD\par \par Ms. Carlos is a 78y/o woman with Hx of HTN, aortic stenosis, rheumatic heart disease, MVR x2 2007/2017 c/b post-op afib, and newly diagnosed AT s/p DCCV on 3/15/2019 w/ PVI and AT ablation (4 ATs noted) on 4/11/2019, remains off Coumadin, who recently underwent an AT ablation (5th AT) w/ ILR placement for which patient presents here today for a f/u visit.  Patient states she feels well with no complaints today.  States compliance on all medications including Metoprolol which was increased at last visit prior to ablation.  Patient denies chest pain, palpitations, sob/gudino, lightheadedness, dizziness and syncope/near syncope. ILR showed no significant arrhythmias.

## 2019-12-16 ENCOUNTER — RX RENEWAL (OUTPATIENT)
Age: 79
End: 2019-12-16

## 2020-01-14 ENCOUNTER — APPOINTMENT (OUTPATIENT)
Dept: ELECTROPHYSIOLOGY | Facility: CLINIC | Age: 80
End: 2020-01-14
Payer: MEDICARE

## 2020-01-14 PROCEDURE — G2066: CPT

## 2020-01-14 PROCEDURE — 93298 REM INTERROG DEV EVAL SCRMS: CPT

## 2020-02-05 NOTE — H&P CARDIOLOGY - PSH
Pt has rash to hairline behind R ear and missing a patch of hair. 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

## 2020-02-14 ENCOUNTER — APPOINTMENT (OUTPATIENT)
Dept: ELECTROPHYSIOLOGY | Facility: CLINIC | Age: 80
End: 2020-02-14
Payer: MEDICARE

## 2020-02-14 PROCEDURE — G2066: CPT

## 2020-02-14 PROCEDURE — 93298 REM INTERROG DEV EVAL SCRMS: CPT

## 2020-03-13 ENCOUNTER — APPOINTMENT (OUTPATIENT)
Dept: ELECTROPHYSIOLOGY | Facility: CLINIC | Age: 80
End: 2020-03-13

## 2020-03-16 ENCOUNTER — APPOINTMENT (OUTPATIENT)
Dept: ELECTROPHYSIOLOGY | Facility: CLINIC | Age: 80
End: 2020-03-16
Payer: MEDICARE

## 2020-03-16 PROCEDURE — G2066: CPT

## 2020-03-16 PROCEDURE — 93298 REM INTERROG DEV EVAL SCRMS: CPT

## 2020-04-16 ENCOUNTER — APPOINTMENT (OUTPATIENT)
Dept: ELECTROPHYSIOLOGY | Facility: CLINIC | Age: 80
End: 2020-04-16
Payer: MEDICARE

## 2020-04-16 PROCEDURE — G2066: CPT

## 2020-04-16 PROCEDURE — 93298 REM INTERROG DEV EVAL SCRMS: CPT

## 2020-05-03 ENCOUNTER — RESULT REVIEW (OUTPATIENT)
Age: 80
End: 2020-05-03

## 2020-05-20 ENCOUNTER — APPOINTMENT (OUTPATIENT)
Dept: ELECTROPHYSIOLOGY | Facility: CLINIC | Age: 80
End: 2020-05-20
Payer: MEDICARE

## 2020-05-20 PROCEDURE — 93298 REM INTERROG DEV EVAL SCRMS: CPT

## 2020-05-20 PROCEDURE — G2066: CPT

## 2020-06-12 ENCOUNTER — NON-APPOINTMENT (OUTPATIENT)
Age: 80
End: 2020-06-12

## 2020-06-12 ENCOUNTER — APPOINTMENT (OUTPATIENT)
Dept: ELECTROPHYSIOLOGY | Facility: CLINIC | Age: 80
End: 2020-06-12
Payer: MEDICARE

## 2020-06-12 VITALS
HEIGHT: 65 IN | OXYGEN SATURATION: 99 % | DIASTOLIC BLOOD PRESSURE: 72 MMHG | TEMPERATURE: 97.6 F | RESPIRATION RATE: 14 BRPM | BODY MASS INDEX: 19.16 KG/M2 | WEIGHT: 115 LBS | SYSTOLIC BLOOD PRESSURE: 145 MMHG | HEART RATE: 64 BPM

## 2020-06-12 VITALS — SYSTOLIC BLOOD PRESSURE: 135 MMHG | DIASTOLIC BLOOD PRESSURE: 58 MMHG

## 2020-06-12 DIAGNOSIS — Z86.79 OTHER SPECIFIED POSTPROCEDURAL STATES: ICD-10-CM

## 2020-06-12 DIAGNOSIS — I10 ESSENTIAL (PRIMARY) HYPERTENSION: ICD-10-CM

## 2020-06-12 DIAGNOSIS — Z98.890 OTHER SPECIFIED POSTPROCEDURAL STATES: ICD-10-CM

## 2020-06-12 PROCEDURE — 93285 PRGRMG DEV EVAL SCRMS IP: CPT

## 2020-06-12 PROCEDURE — 93000 ELECTROCARDIOGRAM COMPLETE: CPT | Mod: 59

## 2020-06-12 PROCEDURE — 99213 OFFICE O/P EST LOW 20 MIN: CPT

## 2020-06-12 RX ORDER — METOPROLOL TARTRATE 25 MG/1
25 TABLET, FILM COATED ORAL TWICE DAILY
Qty: 180 | Refills: 3 | Status: ACTIVE | COMMUNITY

## 2020-06-12 RX ORDER — METOPROLOL TARTRATE 50 MG/1
50 TABLET, FILM COATED ORAL
Qty: 270 | Refills: 3 | Status: DISCONTINUED | COMMUNITY
Start: 2019-09-03 | End: 2020-06-12

## 2020-06-12 RX ORDER — PANTOPRAZOLE 40 MG/1
40 TABLET, DELAYED RELEASE ORAL
Qty: 30 | Refills: 0 | Status: DISCONTINUED | COMMUNITY
Start: 2019-04-12 | End: 2020-06-12

## 2020-06-12 NOTE — PHYSICAL EXAM
[General Appearance - Well Developed] : well developed [General Appearance - Well Nourished] : well nourished [Well Groomed] : well groomed [Normal Appearance] : normal appearance [No Deformities] : no deformities [Normal Conjunctiva] : the conjunctiva exhibited no abnormalities [General Appearance - In No Acute Distress] : no acute distress [Eyelids - No Xanthelasma] : the eyelids demonstrated no xanthelasmas [Normal Oral Mucosa] : normal oral mucosa [No Oral Pallor] : no oral pallor [No Oral Cyanosis] : no oral cyanosis [Normal Jugular Venous A Waves Present] : normal jugular venous A waves present [No Jugular Venous Giron A Waves] : no jugular venous giron A waves [Normal Jugular Venous V Waves Present] : normal jugular venous V waves present [Respiration, Rhythm And Depth] : normal respiratory rhythm and effort [Exaggerated Use Of Accessory Muscles For Inspiration] : no accessory muscle use [Auscultation Breath Sounds / Voice Sounds] : lungs were clear to auscultation bilaterally [Murmurs] : no murmurs present [Heart Rate And Rhythm] : heart rate and rhythm were normal [Heart Sounds] : normal S1 and S2 [Abdomen Soft] : soft [Abdomen Tenderness] : non-tender [Abnormal Walk] : normal gait [Abdomen Mass (___ Cm)] : no abdominal mass palpated [Gait - Sufficient For Exercise Testing] : the gait was sufficient for exercise testing [Nail Clubbing] : no clubbing of the fingernails [Cyanosis, Localized] : no localized cyanosis [Petechial Hemorrhages (___cm)] : no petechial hemorrhages [Skin Color & Pigmentation] : normal skin color and pigmentation [] : no rash [Skin Lesions] : no skin lesions [No Venous Stasis] : no venous stasis [No Skin Ulcers] : no skin ulcer [No Xanthoma] : no  xanthoma was observed [Oriented To Time, Place, And Person] : oriented to person, place, and time [Affect] : the affect was normal [Mood] : the mood was normal [No Anxiety] : not feeling anxious

## 2020-06-13 NOTE — DISCUSSION/SUMMARY
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 AT:  s/p DCCV on 3/15/2019 w/ PVI and AT ablation (4 ATs noted) on 4/11/2019 and s/p AT ablation (5th AT) w/ ILR placement on 11/21/2019. EKG today NSR. ILR with no evidence of recurrence. \par \par 2. PAF: no recurrent noted on ILR. Remains off warfarin. Will wean off metoprolol.  Blood pressure will need to watched. \par \par 3. HTN: resume oral antihypertensives as prescribed. Encouraged heart healthy diet, sodium restriction, and weight loss. Continue regular f/u with Cardiologist.  Will have follow-up of blood pressure with PCP off metoprolol. \par \par Sincerely,\par \par Osiel Potter MD

## 2020-06-13 NOTE — REASON FOR VISIT
[Palpitations] : palpitations [Follow-Up - Clinic] : a clinic follow-up of [FreeTextEntry1] : s/p AT ablation

## 2020-06-13 NOTE — HISTORY OF PRESENT ILLNESS
[FreeTextEntry1] : Amber Guerrero MD\par \par Felicity Carlos is a 80y/o woman with Hx of HTN, aortic stenosis, rheumatic heart disease, MVR x2 2007/2017 c/b post-op afib, and newly diagnosed AT s/p DCCV on 3/15/2019 w/ PVI and AT ablation (4 ATs noted) on 4/11/2019, remains off Coumadin, and s/p AT ablation (5th AT) w/ ILR placement on 11/21/2019 who presents today for routine f/u. Admits doing well with no issues or complaints. Denies chest pain, palpitations, SOB, syncope or near syncope.\par

## 2020-07-14 ENCOUNTER — APPOINTMENT (OUTPATIENT)
Dept: ELECTROPHYSIOLOGY | Facility: CLINIC | Age: 80
End: 2020-07-14
Payer: MEDICARE

## 2020-07-14 PROCEDURE — G2066: CPT

## 2020-07-14 PROCEDURE — 93298 REM INTERROG DEV EVAL SCRMS: CPT

## 2020-08-03 ENCOUNTER — RESULT REVIEW (OUTPATIENT)
Age: 80
End: 2020-08-03

## 2020-08-12 ENCOUNTER — RESULT REVIEW (OUTPATIENT)
Age: 80
End: 2020-08-12

## 2020-08-18 ENCOUNTER — APPOINTMENT (OUTPATIENT)
Dept: ELECTROPHYSIOLOGY | Facility: CLINIC | Age: 80
End: 2020-08-18
Payer: MEDICARE

## 2020-08-18 PROCEDURE — G2066: CPT

## 2020-08-18 PROCEDURE — 93298 REM INTERROG DEV EVAL SCRMS: CPT

## 2020-09-22 ENCOUNTER — APPOINTMENT (OUTPATIENT)
Dept: ELECTROPHYSIOLOGY | Facility: CLINIC | Age: 80
End: 2020-09-22
Payer: MEDICARE

## 2020-09-22 PROCEDURE — 93298 REM INTERROG DEV EVAL SCRMS: CPT

## 2020-09-22 PROCEDURE — G2066: CPT

## 2020-10-27 ENCOUNTER — APPOINTMENT (OUTPATIENT)
Dept: ELECTROPHYSIOLOGY | Facility: CLINIC | Age: 80
End: 2020-10-27
Payer: MEDICARE

## 2020-10-27 PROCEDURE — G2066: CPT

## 2020-10-27 PROCEDURE — 93298 REM INTERROG DEV EVAL SCRMS: CPT

## 2020-11-17 NOTE — DISCHARGE NOTE ADULT - MEDICATION SUMMARY - MEDICATIONS TO STOP TAKING
5
I will STOP taking the medications listed below when I get home from the hospital:    metoprolol tartrate 25 mg oral tablet  -- 0.5 tab(s) by mouth 2 times a day

## 2020-12-01 ENCOUNTER — APPOINTMENT (OUTPATIENT)
Dept: ELECTROPHYSIOLOGY | Facility: CLINIC | Age: 80
End: 2020-12-01
Payer: MEDICARE

## 2020-12-01 PROCEDURE — G2066: CPT

## 2020-12-01 PROCEDURE — 93298 REM INTERROG DEV EVAL SCRMS: CPT

## 2021-01-01 NOTE — ED PROVIDER NOTE - OBJECTIVE STATEMENT
DISCHARGE SUMMARY  8   Discharge date: 2021    BIRTH INFORMATION     YOB: 2022; Time of birth:  10:50 PM  Apgars: 8 and 9, at 1 min and 5 min respectively  Route of Delivery: Vaginal, Spontaneous  Position of delivery: Vertex Direct Occiput Anterior  Infant Blood Type:    Birth Measurements:        Weight: 6 lb 15.1 oz (3150 g)        Length: 19\"        Head circumference: 33.5 cm  Labs:  Cord Blood Evaluation:No results found  Blood gases sent? No   Cord pH No results found    HOSPITAL COURSE     Patient Active Problem List   Diagnosis   • Normal  (single liveborn)   • ABO incompatibility affecting    • Erythromycin refused     Girl Betsy Kelley is a 38-hour old female 6 lb 15.1 oz (3150 g) infant, delivered at Gestational Age: 37w6d on 2022 via Vaginal, Spontaneous to a 41 yo  @37w6d via  cb loose nuchal cord and retained placenta, uterine atony, QBL 2203cc, 3150 g  AGA , GBS neg, Apgars 8/9, Gamez 0.04, breastfeed, Mathx: GTHN with 1st pregnancy, AMA. Patient was    with a -7% weight change at discharge. By discharge,  was afebrile, well-appearing with good po intake and appropriate voiding and stool.       Routine [place] Care:   Vitamin K: given  Erythromycin eye ointment: not given due to parent refusal   Sepsis:   Risk Scores: Risk at Birth: 0.04; Risk - Well Appearin.02; Risk - Equivocal: 0.2  Sepsis Classification: (most recent) Well Appearing (10/23/21 0835)  Plan: Based on risk scores and a current classification of Well Appearing, routine vital signs. no CBC or blood cultures. No antibiotics.      Carseat Evaluation: not indicated as  >37 wks gestation AND >2500 g     Bilirubin Monitoring: low intermediate risk.   24 hr TCB: 7.5   Serum bilirubin:   Recent Labs   Lab 10/23/21  0104   BILIRUBIN 5.9      Screenings:  [place] Screen: obtained  , results pending  Congenital Heart Disease: Normal, Right  hand: 99 %, Foot: 98 %   Hearing Test: Pass R, Pass L  Immunizations: Hepatitis B Consent obtained, Administered     OBJECTIVE     Visit Vitals  Pulse 140   Temp 98.8 °F (37.1 °C) (Axillary)   Resp 48   Ht 19\" (48.3 cm) Comment: Filed from Delivery Summary   Wt 2929 g   HC 33.5 cm (13.19\") Comment: Filed from Delivery Summary   BMI 12.58 kg/m²     Birth Weight: 6 lb 15.1 oz (3150 g)  Wt Change: -7%    Intake/Output  Report      10/22 0700 - 10/23 0659 10/23 0700 - 10/24 0659          Urine Occurrence 2 x 1 x    Stool Occurrence 1 x 1 x    Unmeasured Breast Milk Occurrence 10 x           PHYSICAL EXAM:  GENERAL: alert, vigorous with appropriate behavior. No acute distress.  Easily consolable.  SKIN: warm with normal turgor. No jaundice. No rashes or bruises. No sacral dimple or hair masood.   HEAD: Atraumatic and normocephalic. AFOSF. No caput or cephalohematoma.   EYES: Conjunctivae appear normal.   Red reflexes present bilaterally.  EARS: Pinnae normal.   NOSE: There is no nasal flaring, nares patent bilaterally.  THROAT:  The oropharynx is normal. Moist mucous membranes.   NECK: Clavicles without crepitus.  LUNGS: CTAB, symmetrical, No nasal flaring, grunting, or retractions  HEART: Regular rate, S1 and S2 present, no murmurs. Femoral pulses 2+   ABDOMEN: soft, non-distended, umbilicus clamped and drying, no organomegaly, no hernia   GENITALIA: normal female genitalia  RECTAL: anus patent  EXTREMITIES: Moving all 4 extremities. Negative Ortolani & White.  NEUROLOGIC: normal tone, no tremors, positive brianna, grasp and suck    DISPOSITION  Home with parents    Follow up:  With [facility] Dr. Maryse CALLAWAY for weight check.    Parent(s) given discharge instructions, including information regarding feeding, any restrictions, and follow up information.     Patient was seen and staffed with Family Medicine attending, Dr. Fabian Madrid MD, who agrees with the plan.    Candice Emmanuel DO  2021   pt with hx post-op a fib s/p valve replacement, c/o few days of palp with dizziness/lightheadedness. no fevers, ha, cp, sob, abd pain, edema, calf pain, travel.  pmd - bianka trujillo

## 2021-01-05 ENCOUNTER — APPOINTMENT (OUTPATIENT)
Dept: ELECTROPHYSIOLOGY | Facility: CLINIC | Age: 81
End: 2021-01-05
Payer: MEDICARE

## 2021-01-05 PROCEDURE — G2066: CPT

## 2021-01-05 PROCEDURE — 93298 REM INTERROG DEV EVAL SCRMS: CPT

## 2021-02-09 ENCOUNTER — APPOINTMENT (OUTPATIENT)
Dept: ELECTROPHYSIOLOGY | Facility: CLINIC | Age: 81
End: 2021-02-09
Payer: MEDICARE

## 2021-02-09 PROCEDURE — 93298 REM INTERROG DEV EVAL SCRMS: CPT

## 2021-02-09 PROCEDURE — G2066: CPT

## 2021-03-02 ENCOUNTER — NON-APPOINTMENT (OUTPATIENT)
Age: 81
End: 2021-03-02

## 2021-03-16 ENCOUNTER — NON-APPOINTMENT (OUTPATIENT)
Age: 81
End: 2021-03-16

## 2021-03-16 ENCOUNTER — APPOINTMENT (OUTPATIENT)
Dept: ELECTROPHYSIOLOGY | Facility: CLINIC | Age: 81
End: 2021-03-16
Payer: MEDICARE

## 2021-03-16 PROCEDURE — G2066: CPT

## 2021-03-16 PROCEDURE — 93298 REM INTERROG DEV EVAL SCRMS: CPT

## 2021-04-20 ENCOUNTER — APPOINTMENT (OUTPATIENT)
Dept: ELECTROPHYSIOLOGY | Facility: CLINIC | Age: 81
End: 2021-04-20
Payer: MEDICARE

## 2021-04-20 ENCOUNTER — NON-APPOINTMENT (OUTPATIENT)
Age: 81
End: 2021-04-20

## 2021-04-20 PROCEDURE — 93298 REM INTERROG DEV EVAL SCRMS: CPT

## 2021-04-20 PROCEDURE — G2066: CPT

## 2021-06-18 ENCOUNTER — APPOINTMENT (OUTPATIENT)
Dept: ELECTROPHYSIOLOGY | Facility: CLINIC | Age: 81
End: 2021-06-18
Payer: MEDICARE

## 2021-06-18 VITALS — SYSTOLIC BLOOD PRESSURE: 132 MMHG | DIASTOLIC BLOOD PRESSURE: 51 MMHG

## 2021-06-18 DIAGNOSIS — I47.1 SUPRAVENTRICULAR TACHYCARDIA: ICD-10-CM

## 2021-06-18 PROCEDURE — 93285 PRGRMG DEV EVAL SCRMS IP: CPT

## 2021-07-18 ENCOUNTER — NON-APPOINTMENT (OUTPATIENT)
Age: 81
End: 2021-07-18

## 2021-07-19 ENCOUNTER — APPOINTMENT (OUTPATIENT)
Dept: ELECTROPHYSIOLOGY | Facility: CLINIC | Age: 81
End: 2021-07-19
Payer: MEDICARE

## 2021-07-19 PROCEDURE — 93298 REM INTERROG DEV EVAL SCRMS: CPT

## 2021-07-19 PROCEDURE — G2066: CPT

## 2021-08-19 ENCOUNTER — NON-APPOINTMENT (OUTPATIENT)
Age: 81
End: 2021-08-19

## 2021-08-23 ENCOUNTER — NON-APPOINTMENT (OUTPATIENT)
Age: 81
End: 2021-08-23

## 2021-08-23 ENCOUNTER — APPOINTMENT (OUTPATIENT)
Dept: ELECTROPHYSIOLOGY | Facility: CLINIC | Age: 81
End: 2021-08-23
Payer: MEDICARE

## 2021-08-23 PROCEDURE — 93298 REM INTERROG DEV EVAL SCRMS: CPT

## 2021-08-23 PROCEDURE — G2066: CPT

## 2021-09-27 ENCOUNTER — NON-APPOINTMENT (OUTPATIENT)
Age: 81
End: 2021-09-27

## 2021-09-27 ENCOUNTER — APPOINTMENT (OUTPATIENT)
Dept: ELECTROPHYSIOLOGY | Facility: CLINIC | Age: 81
End: 2021-09-27
Payer: MEDICARE

## 2021-09-27 PROCEDURE — G2066: CPT

## 2021-09-27 PROCEDURE — 93298 REM INTERROG DEV EVAL SCRMS: CPT

## 2021-11-01 ENCOUNTER — APPOINTMENT (OUTPATIENT)
Dept: ELECTROPHYSIOLOGY | Facility: CLINIC | Age: 81
End: 2021-11-01
Payer: MEDICARE

## 2021-11-01 ENCOUNTER — NON-APPOINTMENT (OUTPATIENT)
Age: 81
End: 2021-11-01

## 2021-11-01 PROCEDURE — G2066: CPT | Mod: NC

## 2021-11-01 PROCEDURE — 93298 REM INTERROG DEV EVAL SCRMS: CPT

## 2021-12-06 ENCOUNTER — NON-APPOINTMENT (OUTPATIENT)
Age: 81
End: 2021-12-06

## 2021-12-06 ENCOUNTER — APPOINTMENT (OUTPATIENT)
Dept: ELECTROPHYSIOLOGY | Facility: CLINIC | Age: 81
End: 2021-12-06
Payer: MEDICARE

## 2021-12-06 PROCEDURE — G2066: CPT

## 2021-12-06 PROCEDURE — 93298 REM INTERROG DEV EVAL SCRMS: CPT

## 2022-01-10 ENCOUNTER — APPOINTMENT (OUTPATIENT)
Dept: ELECTROPHYSIOLOGY | Facility: CLINIC | Age: 82
End: 2022-01-10
Payer: MEDICARE

## 2022-01-10 ENCOUNTER — NON-APPOINTMENT (OUTPATIENT)
Age: 82
End: 2022-01-10

## 2022-01-10 PROCEDURE — G2066: CPT

## 2022-01-10 PROCEDURE — 93298 REM INTERROG DEV EVAL SCRMS: CPT

## 2022-01-23 NOTE — PATIENT PROFILE ADULT - NSPROHMSYMPCOND_GEN_A_NUR
Internal Medicine History & Physical     Chief Complaint: Abdominal Pain (cramping)  Reason for Admission: acute diverticulitis  Primary Care Physician: Wisam Christian MD  Code status: full    History of Present Illness  Shoshana gerardo is a 79y.o. year old female who  has a past medical history of CAD (coronary artery disease), Chest pain, Hyperlipidemia, MI (myocardial infarction) ([address] Pleural effusion on left, S/P coronary artery bypass graft x 2, S/P thoracentesis, and Thyroid disease. .     The patient presented to the emergency room after having a few days of having abdominal pain and distal comfort and difficulty with having bowel movements. She tried taking some laxatives at home without any benefit. In the emergency room lab work overall within normal limits however on CT abdomen found evidence of diverticulitis with possible microperforation. Surgery consulted, recommend admission for IV antibiotic treatment and monitoring. At this current point patient feels fine except for has some diffuse abdominal tenderness. No nausea or vomiting.         Therapy in ED-   Medications   sodium chloride flush 0.9 % injection 10 mL (10 mLs IntraVENous Given 1/23/22 1112)   cefTRIAXone (ROCEPHIN) 1 g injection (  Not Given 1/23/22 1251)   sterile water injection (  Not Given 1/23/22 1252)   piperacillin-tazobactam (ZOSYN) 4,500 mg in dextrose 5 % 100 mL IVPB (mini-bag) (4,500 mg IntraVENous New Bag 1/23/22 1332)   acetaminophen (TYLENOL) tablet 650 mg (650 mg Oral Given 1/23/22 1332)   0.9 % sodium chloride bolus (0 mLs IntraVENous Stopped 1/23/22 1053)   iopamidol (ISOVUE-370) 76 % injection 75 mL (75 mLs IntraVENous Given 1/23/22 1108)   cefTRIAXone (ROCEPHIN) 1,000 mg in sterile water 10 mL IV syringe (0 mg IntraVENous Stopped 1/23/22 1315)       Past Medical History:   Diagnosis Date    CAD (coronary artery disease)     ischemic    Chest pain     Hyperlipidemia     MI (myocardial infarction) (HCC)     Pleural effusion on left     S/P coronary artery bypass graft x 2 03/16/10    Dr. Nunez Quiet S/P thoracentesis 04/23/10    Left    Thyroid disease     Hypothyroidism       Past Surgical History:   Procedure Laterality Date    CARDIAC SURGERY      CORONARY ARTERY BYPASS GRAFT  03/16/10    ACB x 2 by Dr. Debo Baldwin PROCEDURE  03/15/10    Dr. Bernadette Owusu  04/23/10    TONSILLECTOMY         Family History  History reviewed. No pertinent family history. Social History  Patient lives at home  Employment: Retired  Illicit drug use-none  TOBACCO:   reports that she has never smoked. She has never used smokeless tobacco.  ETOH:   reports current alcohol use. Home Medications  Prior to Admission medications    Medication Sig Start Date End Date Taking? Authorizing Provider   magnesium oxide (MAG-OX) 400 MG tablet Take 500 mg by mouth daily    Historical Provider, MD   simvastatin (ZOCOR) 40 MG tablet Take 40 mg by mouth nightly. Historical Provider, MD   levothyroxine (SYNTHROID) 25 MCG tablet Take 50 mcg by mouth daily     Historical Provider, MD   aspirin 81 MG EC tablet Take 81 mg by mouth daily. Historical Provider, MD       Allergies  Allergies   Allergen Reactions    Oxycontin [Oxycodone Hcl]     Percocet [Oxycodone-Acetaminophen]     Tetanus Toxoids     Vicodin [Hydrocodone-Acetaminophen]        Review of Systems  Please see HPI above. All bolded are positive. All un-bolded are negative.   Gen: fever, chills, fatigue, generalized weakness, diaphoresis, increase in thirst, unintentional weight change, loss of appetite  Head: headache, vision change, hearing loss  Chest: chest pain, chest heaviness, palpitations  Lungs: shortness of breath, wheezing, coughing  Abdomen: abdominal pain, nausea, vomiting, diarrhea, constipation, melena, hematochezia, hematemesis  Back: back pain  Extremities: lower extremity edema, myalgias, arthralgias  Urinary: dysuria, hematuria, or increase in frequency  Neurologic: lightheadedness, dizziness, confusion, syncope, numbness, tingling, weakness  Psychiatric: depression, suicidal ideation, or anxiety    Objective  /62   Pulse 80   Temp 97.5 °F (36.4 °C) (Temporal)   Resp 14   Ht 5' 1\" (1.549 m)   Wt 113 lb (51.3 kg)   SpO2 96%   BMI 21.35 kg/m²     Physical Exam:  General: awake, alert, oriented to person, place, time, and purpose, appears stated age, cooperative, no acute distress, pleasant   Head: normocephalic, atraumatic  Eyes: conjunctivae/corneas clear, sclera non icteric  Mouth: mucous membranes moist, no obvious oral sores  Neck: no JVD, no adenopathy, no carotid bruit, neck is supple, trachea is midline  Back: ROM normal, no CVA tenderness. Lungs: clear to auscultation bilaterally, without rhonchi, crackle, wheezing, or rale, no retractions or use of accessory muscles  Heart: regular rate and regular rhythm, no murmur, normal S1, S2  Abdomen: soft, diffusely tender but more in RLL; bowel sounds normal; no masses, no organomegaly  Extremities: no lower extremity edema, extremities atraumatic, no cyanosis, no clubbing, 2+ pedal pulses palpated  Skin: normal color, normal texture, normal turgor, no rashes, no lesions  Neurologic:5/5 muscle strength throughout, normal muscle tone throughout, PERRLA, EOMI, face symmetric, hearing intact, tongue midline, speech appropriate without slurring. Labs-   Lab Results   Component Value Date    WBC 10.3 01/23/2022    HGB 12.3 01/23/2022    HCT 37.6 01/23/2022     01/23/2022     01/23/2022    K 4.2 01/23/2022     01/23/2022    CREATININE 0.8 01/23/2022    BUN 12 01/23/2022    CO2 24 01/23/2022    GLUCOSE 155 (H) 01/23/2022    ALT 12 01/23/2022    AST 20 01/23/2022     Lab Results   Component Value Date    CKTOTAL 35 10/17/2010    CKMB <0.2 10/17/2010    TROPONINI <0.01 10/17/2010     No results for input(s): BNP in the last 72 hours.     Recent Radiological Studies:  CT ABDOMEN PELVIS W IV CONTRAST Additional Contrast? None    Result Date: 1/23/2022  EXAMINATION: CT OF THE ABDOMEN AND PELVIS WITH CONTRAST 1/23/2022 11:07 am TECHNIQUE: CT of the abdomen and pelvis was performed with the administration of intravenous contrast. Multiplanar reformatted images are provided for review. Dose modulation, iterative reconstruction, and/or weight based adjustment of the mA/kV was utilized to reduce the radiation dose to as low as reasonably achievable. COMPARISON: None. HISTORY: ORDERING SYSTEM PROVIDED HISTORY: abdominal pain TECHNOLOGIST PROVIDED HISTORY: Additional Contrast?->None Reason for exam:->abdominal pain Decision Support Exception - unselect if not a suspected or confirmed emergency medical condition->Emergency Medical Condition (MA) FINDINGS: Lower Chest: There is mild motion artifact. Lung bases are clear. No pleural fluid. The heart appears normal in size. There are changes of a prior median sternotomy. Organs: Liver and spleen are normal in size without focal lesion. There is cholelithiasis without cholecystitis. Pancreas appears normal.  The adrenal glands appear normal.  There is bilateral hydronephrosis and proximal hydroureter, left greater than right. No calculi detected. The distal ureters at the urinary bladder appear normal.  Bladder contains a small amount of gas but is otherwise unremarkable. GI/Bowel: No CT evidence of bowel obstruction. There is significant wall thickening and inflammation involving the sigmoid colon. Gas and inflammation is seen adjacent to the rectosigmoid colon lumen suggestive of contained micro perforation. No abscess is identified. There is a trace amount of free pelvic fluid. Pelvis: Uterus and ovaries appear normal. Peritoneum/Retroperitoneum: No retroperitoneal adenopathy or mass. There is ASVD of the abdominal aorta without evidence of aneurysm.  Bones/Soft Tissues: Subcutaneous tissues appear to be within normal limits. 1.  Severe inflammation of the rectosigmoid colon with contained adjacent micro perforation. No abscess detected. Findings are compatible with colitis or diverticulitis. Follow-up imaging or colonoscopy recommended following the patient's acute clinical course to help exclude neoplasm. 2.  Small amount of gas in the urinary bladder. Distal ureters are normal. No ureteral calculi. There is significant pelvic inflammation which may contribute to bilateral hydronephrosis left greater than right. 3.  The appendix is normal.       Assessment/Plan  Patient is a 79 y.o. female who presents with Abdominal Pain (cramping)      Full problem list includes:  Patient Active Problem List    Diagnosis Date Noted    Acute diverticulitis 01/23/2022    Hypothyroidism (acquired) 01/23/2022    Hx of CABG 10/08/2018    Pure hypercholesterolemia 10/08/2018    Coronary artery disease involving native coronary artery of native heart without angina pectoris 10/08/2018       · Acute Diverticulitis  · Gen Surg consulted  · IV antibiotics  · Gentle IV fluids overnight  · monitor  · Hypothyroidism  · Continue synthroid  · Coronary Artery Disease  · Continue aspirin  · Continue statin  · Routine labs in am  · Lovenox for DVT prophylaxis. · Please see orders for further management and care. · Discharge; likely to home about 2 days = her  is currently at a nursing home facility and is potentially being discharged to home with home hospice and patient would like to feel better in order to help take care of her dying . Li Sinha MD    1/23/2022  1:39 PM    NOTE:  This report was transcribed using voice recognition software. Every effort was made to ensure accuracy; however, inadvertent computerized transcription errors may be present. cardiovascular

## 2022-02-14 ENCOUNTER — APPOINTMENT (OUTPATIENT)
Dept: ELECTROPHYSIOLOGY | Facility: CLINIC | Age: 82
End: 2022-02-14
Payer: MEDICARE

## 2022-02-14 ENCOUNTER — NON-APPOINTMENT (OUTPATIENT)
Age: 82
End: 2022-02-14

## 2022-02-14 PROCEDURE — G2066: CPT

## 2022-02-14 PROCEDURE — 93298 REM INTERROG DEV EVAL SCRMS: CPT

## 2022-03-21 ENCOUNTER — APPOINTMENT (OUTPATIENT)
Dept: ELECTROPHYSIOLOGY | Facility: CLINIC | Age: 82
End: 2022-03-21
Payer: MEDICARE

## 2022-03-21 ENCOUNTER — NON-APPOINTMENT (OUTPATIENT)
Age: 82
End: 2022-03-21

## 2022-03-21 PROCEDURE — G2066: CPT

## 2022-03-21 PROCEDURE — 93298 REM INTERROG DEV EVAL SCRMS: CPT

## 2022-04-05 NOTE — DISCHARGE NOTE PROVIDER - PROVIDER TOKENS
Subjective   Carlos Pinto is a 48 y.o. male.     Chief Complaint   Patient presents with   • Diabetes     Check up        History of Present Illness     here today wiht his wife--anxity is stable --he doesnt monito his fsbs..tolearting statin wit jout myalgis       Current Outpatient Medications:   •  ALPRAZolam (XANAX) 0.5 MG tablet, Take 1 tablet by mouth 3 (Three) Times a Day As Needed for Anxiety., Disp: 10 tablet, Rfl: 0  •  atorvastatin (LIPITOR) 10 MG tablet, TAKE 1 TABLET BY MOUTH ONCE DAILY . APPOINTMENT REQUIRED FOR FUTURE REFILLS, Disp: 15 tablet, Rfl: 0  •  glucose blood (FREESTYLE LITE) test strip, Use to test blood sugar daily, Disp: 100 each, Rfl: 5  •  metFORMIN (GLUCOPHAGE) 500 MG tablet, TAKE 1 TABLET BY MOUTH TWICE DAILY WITH MEALS . APPOINTMENT REQUIRED FOR FUTURE REFILLS, Disp: 60 tablet, Rfl: 0  •  PARoxetine (PAXIL) 20 MG tablet, TAKE 1 TABLET BY MOUTH IN THE MORNING, Disp: 30 tablet, Rfl: 5  •  traZODone (DESYREL) 100 MG tablet, TAKE 1 TABLET BY MOUTH ONCE DAILY AT NIGHT, Disp: 30 tablet, Rfl: 2  No Known Allergies    Patient's Body mass index is 36.15 kg/m². indicating that he is obese (BMI >30). Obesity-related health conditions include the following: diabetes mellitus. Obesity is unchanged. BMI is is above average; BMI management plan is completed. We discussed portion control and increasing exercise..      No past medical history on file.  No past surgical history on file.    Review of Systems   Constitutional: Negative.    HENT: Negative.    Eyes: Negative.    Respiratory: Negative.    Cardiovascular: Negative.    Gastrointestinal: Negative.    Endocrine: Negative.    Genitourinary: Negative.    Musculoskeletal: Negative.    Skin: Negative.    Allergic/Immunologic: Negative.    Neurological: Negative.    Hematological: Negative.    Psychiatric/Behavioral: Positive for dysphoric mood and sleep disturbance. Negative for self-injury and suicidal ideas. The patient is nervous/anxious.   "      Objective  /82   Pulse 68   Resp 14   Ht 167.6 cm (66\")   Wt 102 kg (224 lb)   SpO2 97%   BMI 36.15 kg/m²   Physical Exam  Vitals and nursing note reviewed.   Constitutional:       Appearance: Normal appearance. He is normal weight.   HENT:      Head: Normocephalic and atraumatic.      Nose: Nose normal.      Mouth/Throat:      Mouth: Mucous membranes are moist.   Eyes:      Extraocular Movements: Extraocular movements intact.      Pupils: Pupils are equal, round, and reactive to light.   Cardiovascular:      Rate and Rhythm: Normal rate and regular rhythm.      Pulses: Normal pulses.      Heart sounds: Normal heart sounds.   Pulmonary:      Effort: Pulmonary effort is normal.      Breath sounds: Normal breath sounds.   Abdominal:      General: Abdomen is flat. Bowel sounds are normal.      Palpations: Abdomen is soft.   Musculoskeletal:         General: Normal range of motion.      Cervical back: Normal range of motion and neck supple.   Skin:     General: Skin is warm and dry.      Capillary Refill: Capillary refill takes less than 2 seconds.   Neurological:      General: No focal deficit present.      Mental Status: He is alert and oriented to person, place, and time. Mental status is at baseline.   Psychiatric:         Mood and Affect: Mood normal.         Assessment/Plan   Diagnoses and all orders for this visit:    1. DM type 2, goal A1C to be determined (HCC) (Primary)  -     PSA Screen  -     CBC & Differential  -     Comprehensive metabolic panel  -     Lipid Panel With / Chol / HDL Ratio  -     Hemoglobin A1c  -     MicroAlbumin, Urine, Random - Urine, Clean Catch    2. Anxiety  -     PSA Screen  -     CBC & Differential  -     Comprehensive metabolic panel  -     Lipid Panel With / Chol / HDL Ratio  -     Hemoglobin A1c  -     MicroAlbumin, Urine, Random - Urine, Clean Catch    3. Primary insomnia  -     PSA Screen  -     CBC & Differential  -     Comprehensive metabolic panel  -     " Lipid Panel With / Chol / HDL Ratio  -     Hemoglobin A1c  -     MicroAlbumin, Urine, Random - Urine, Clean Catch    4. Reactive depression  -     PSA Screen  -     CBC & Differential  -     Comprehensive metabolic panel  -     Lipid Panel With / Chol / HDL Ratio  -     Hemoglobin A1c  -     MicroAlbumin, Urine, Random - Urine, Clean Catch    5. Mixed hyperlipidemia  -     PSA Screen  -     CBC & Differential  -     Comprehensive metabolic panel  -     Lipid Panel With / Chol / HDL Ratio  -     Hemoglobin A1c  -     MicroAlbumin, Urine, Random - Urine, Clean Catch    6. Screening for malignant neoplasm of prostate  -     PSA Screen      He will monitor bs and keep me informed.           Orders Placed This Encounter   Procedures   • PSA Screen     Order Specific Question:   Release to patient     Answer:   Immediate   • Comprehensive metabolic panel     Order Specific Question:   Release to patient     Answer:   Immediate   • Lipid Panel With / Chol / HDL Ratio     Order Specific Question:   Release to patient     Answer:   Immediate   • Hemoglobin A1c     Order Specific Question:   Release to patient     Answer:   Immediate   • MicroAlbumin, Urine, Random - Urine, Clean Catch     Order Specific Question:   Release to patient     Answer:   Immediate   • CBC & Differential       Follow up: 12 month(s)   PROVIDER:[TOKEN:[[id number]],FOLLOWUP:[2 weeks]]

## 2022-04-25 ENCOUNTER — APPOINTMENT (OUTPATIENT)
Dept: ELECTROPHYSIOLOGY | Facility: CLINIC | Age: 82
End: 2022-04-25
Payer: MEDICARE

## 2022-04-25 ENCOUNTER — NON-APPOINTMENT (OUTPATIENT)
Age: 82
End: 2022-04-25

## 2022-04-25 PROCEDURE — 93298 REM INTERROG DEV EVAL SCRMS: CPT

## 2022-04-25 PROCEDURE — G2066: CPT

## 2022-05-27 ENCOUNTER — APPOINTMENT (OUTPATIENT)
Dept: ELECTROPHYSIOLOGY | Facility: CLINIC | Age: 82
End: 2022-05-27
Payer: MEDICARE

## 2022-05-27 ENCOUNTER — NON-APPOINTMENT (OUTPATIENT)
Age: 82
End: 2022-05-27

## 2022-05-27 PROCEDURE — 93298 REM INTERROG DEV EVAL SCRMS: CPT

## 2022-05-27 PROCEDURE — G2066: CPT

## 2022-06-17 ENCOUNTER — APPOINTMENT (OUTPATIENT)
Dept: ELECTROPHYSIOLOGY | Facility: CLINIC | Age: 82
End: 2022-06-17
Payer: MEDICARE

## 2022-06-17 VITALS — HEART RATE: 68 BPM | SYSTOLIC BLOOD PRESSURE: 147 MMHG | DIASTOLIC BLOOD PRESSURE: 66 MMHG | RESPIRATION RATE: 14 BRPM

## 2022-06-17 VITALS — DIASTOLIC BLOOD PRESSURE: 66 MMHG | HEART RATE: 68 BPM | SYSTOLIC BLOOD PRESSURE: 147 MMHG | RESPIRATION RATE: 14 BRPM

## 2022-06-17 DIAGNOSIS — Z86.79 OTHER SPECIFIED POSTPROCEDURAL STATES: ICD-10-CM

## 2022-06-17 DIAGNOSIS — Z98.890 OTHER SPECIFIED POSTPROCEDURAL STATES: ICD-10-CM

## 2022-06-17 PROCEDURE — 93285 PRGRMG DEV EVAL SCRMS IP: CPT

## 2022-06-17 RX ORDER — ALENDRONATE SODIUM 35 MG/1
35 TABLET ORAL
Refills: 0 | Status: ACTIVE | COMMUNITY

## 2022-07-13 NOTE — H&P PST ADULT - HEIGHT IN INCHES
Ketoconazole Pregnancy And Lactation Text: This medication is Pregnancy Category C and it isn't know if it is safe during pregnancy. It is also excreted in breast milk and breast feeding isn't recommended. 4

## 2022-07-22 ENCOUNTER — APPOINTMENT (OUTPATIENT)
Dept: ELECTROPHYSIOLOGY | Facility: CLINIC | Age: 82
End: 2022-07-22

## 2022-07-22 ENCOUNTER — NON-APPOINTMENT (OUTPATIENT)
Age: 82
End: 2022-07-22

## 2022-07-22 PROCEDURE — G2066: CPT

## 2022-07-22 PROCEDURE — 93298 REM INTERROG DEV EVAL SCRMS: CPT

## 2022-08-26 ENCOUNTER — APPOINTMENT (OUTPATIENT)
Dept: ELECTROPHYSIOLOGY | Facility: CLINIC | Age: 82
End: 2022-08-26

## 2022-08-26 ENCOUNTER — NON-APPOINTMENT (OUTPATIENT)
Age: 82
End: 2022-08-26

## 2022-08-26 PROCEDURE — G2066: CPT

## 2022-08-26 PROCEDURE — 93298 REM INTERROG DEV EVAL SCRMS: CPT

## 2022-09-30 ENCOUNTER — APPOINTMENT (OUTPATIENT)
Dept: ELECTROPHYSIOLOGY | Facility: CLINIC | Age: 82
End: 2022-09-30

## 2022-09-30 ENCOUNTER — NON-APPOINTMENT (OUTPATIENT)
Age: 82
End: 2022-09-30

## 2022-09-30 PROCEDURE — 93298 REM INTERROG DEV EVAL SCRMS: CPT

## 2022-09-30 PROCEDURE — G2066: CPT

## 2022-10-06 ENCOUNTER — NON-APPOINTMENT (OUTPATIENT)
Age: 82
End: 2022-10-06

## 2022-10-07 ENCOUNTER — EMERGENCY (EMERGENCY)
Facility: HOSPITAL | Age: 82
LOS: 1 days | Discharge: HOME | End: 2022-10-07
Attending: STUDENT IN AN ORGANIZED HEALTH CARE EDUCATION/TRAINING PROGRAM | Admitting: STUDENT IN AN ORGANIZED HEALTH CARE EDUCATION/TRAINING PROGRAM
Payer: COMMERCIAL

## 2022-10-07 VITALS
OXYGEN SATURATION: 98 % | RESPIRATION RATE: 16 BRPM | SYSTOLIC BLOOD PRESSURE: 147 MMHG | TEMPERATURE: 98 F | HEIGHT: 65 IN | HEART RATE: 64 BPM | WEIGHT: 115.08 LBS | DIASTOLIC BLOOD PRESSURE: 65 MMHG

## 2022-10-07 DIAGNOSIS — C44.91 BASAL CELL CARCINOMA OF SKIN, UNSPECIFIED: Chronic | ICD-10-CM

## 2022-10-07 PROCEDURE — 99284 EMERGENCY DEPT VISIT MOD MDM: CPT | Mod: 25

## 2022-10-07 PROCEDURE — G1004: CPT

## 2022-10-07 PROCEDURE — 70450 CT HEAD/BRAIN W/O DYE: CPT | Mod: 26,ME

## 2022-10-07 PROCEDURE — 12011 RPR F/E/E/N/L/M 2.5 CM/<: CPT

## 2022-10-07 PROCEDURE — 70450 CT HEAD/BRAIN W/O DYE: CPT | Mod: ME

## 2022-10-07 NOTE — ED PROVIDER NOTE - CROS ED SKIN ALL NEG
Immediate Brief Procedure Note    Patient Name:  Ajay Moreira  YOB: 1958  DATE OF PROCEDURE : 4/16/2018  PROCEDURALIST: Hernan Neves MD  ASSISTANT(S):  None  ANESTHESIA TYPE:  Moderate sedation  ANESTHESIOLOGIST:  None    PROCEDURE PERFORMED:  Right Port Placement.    Pre-procedure Dx:   Patient Active Problem List   Diagnosis   • CHF (congestive heart failure) (CMS/HCC)   • Erectile dysfunction   • DIAMANTE (generalized anxiety disorder)   • Moderate obstructive sleep apnea   • Squamous cell carcinoma of tonsil (CMS/[facility])       Post-procedure Dx: Same    Findings: Technically successful right port placement..    Estimated Blood Loss: Less than 5 ml.    Complications: None noted    Specimens Removed: No  
- - -

## 2022-10-07 NOTE — ED PROVIDER NOTE - ENMT, MLM
Left forehead laceration with dermabond and steristrips otherwise no obvious head/facial trauma noted.

## 2022-10-07 NOTE — ED PROVIDER NOTE - NS ED ATTENDING STATEMENT MOD
This was a shared visit with the MARIANNE. I reviewed and verified the documentation and independently performed the documented:

## 2022-10-07 NOTE — ED PROVIDER NOTE - NSICDXPASTSURGICALHX_GEN_ALL_CORE_FT
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

## 2022-10-07 NOTE — ED PROVIDER NOTE - NSICDXPASTMEDICALHX_GEN_ALL_CORE_FT
PAST MEDICAL HISTORY:  A-fib 2007 coverted 1/2008    Aortic stenosis     BCC (basal cell carcinoma of skin)     Constipation, unspecified constipation type     Fibroid     [place] (hard of hearing)     HTN (hypertension), benign     Melanoma     Mitral valve disease     Ovarian cyst     Rheumatic fever at 11 years of age    Rheumatic fever childhood

## 2022-10-07 NOTE — ED PROVIDER NOTE - PATIENT PORTAL LINK FT
You can access the FollowMyHealth Patient Portal offered by [facility] by registering at the following website: [email or web address]. By joining [employer]’s FollowMyHealth portal, you will also be able to view your health information using other applications (apps) compatible with our system.

## 2022-10-07 NOTE — ED PROVIDER NOTE - NSFOLLOWUPINSTRUCTIONS_ED_ALL_ED_FT
There are many types of head injuries. They can be as minor as a small bump. Some head injuries can be worse. Worse injuries include:  •A strong hit to the head that shakes the brain back and forth, causing damage (concussion).      •A bruise (contusion) of the brain. This means there is bleeding in the brain that can cause swelling.      •A cracked skull (skull fracture).      •Bleeding in the brain that gathers, gets thick (makes a clot), and forms a bump (hematoma).      Most problems from a head injury come in the first 24 hours. However, you may still have side effects up to 7–10 days after your injury. It is important to watch your condition for any changes. You may need to be watched in the emergency department or urgent care, or you may need to stay in the hospital.      What are the causes?    There are many possible causes of a head injury. A serious head injury may be caused by:  •A car accident.      •Bicycle or motorcycle accidents.      •Sports injuries.      •Falls.      •Being hit by an object.        What are the signs or symptoms?    Symptoms of a head injury include a bruise, bump, or bleeding where the injury happened. Other physical symptoms may include:  •Headache.      •Feeling like you may vomit (nauseous) or vomiting.      •Dizziness.      •Blurred or double vision.      •Being uncomfortable around bright lights or loud noises.      •Shaking movements that you cannot control (seizures).      •Feeling tired.      •Trouble being woken up.      •Fainting or loss of consciousness.      Mental or emotional symptoms may include:  •Feeling grumpy or cranky.      •Confusion and memory problems.      •Having trouble paying attention or concentrating.      •Changes in eating or sleeping habits.      •Feeling worried or nervous (anxious).      •Feeling sad (depressed).        How is this treated?    Treatment for this condition depends on how severe the injury is and the type of injury you have. The main goal is to prevent problems and to allow the brain time to heal.    Mild head injury     If you have a mild head injury, you may be sent home, and treatment may include:  •Being watched. A responsible adult should stay with you for 24 hours after your injury and check on you often.      •Physical rest.      •Brain rest.      •Pain medicines.      Severe head injury    If you have a severe head injury, treatment may include:  •Being watched closely. This includes staying in the hospital.    •Medicines to:  •Help with pain.      •Prevent seizures.      •Help with brain swelling.        •Protecting your airway and using a machine that helps you breathe (ventilator).      •Treatments to watch for and manage swelling inside the brain.    •Brain surgery. This may be needed to:  •Remove a collection of blood or blood clots.      •Stop the bleeding.      •Remove a part of the skull. This allows room for the brain to swell.          Follow these instructions at home:    Activity     •Rest.      •Avoid activities that are hard or tiring.      •Make sure you get enough sleep.    •Let your brain rest. Do this by limiting activities that need a lot of thought or attention, such as:  •Watching TV.      •Playing memory games and puzzles.      •Job-related work or homework.      •Working on the computer, social media, and texting.        •Avoid activities that could cause another head injury until your doctor says it is okay. This includes playing sports. Having another head injury, especially before the first one has healed, can be dangerous.      •Ask your doctor when it is safe for you to go back to your normal activities, such as work or school. Ask your doctor for a step-by-step plan for slowly going back to your normal activities.      •Ask your doctor when you can drive, ride a bicycle, or use heavy machinery. Do not do these activities if you are dizzy.        Lifestyle      • Do not drink alcohol until your doctor says it is okay.      • Do not use drugs.      •If it is harder than usual to remember things, write them down.      •If you are easily distracted, try to do one thing at a time.      •Talk with family members or close friends when making important decisions.      •Tell your friends, family, a trusted co-worker, and  about your injury, symptoms, and limits (restrictions). Have them watch for any problems that are new or getting worse.      General instructions     •Take over-the-counter and prescription medicines only as told by your doctor.      •Have someone stay with you for 24 hours after your head injury. This person should watch you for any changes in your symptoms and be ready to get help.      •Keep all follow-up visits as told by your doctor. This is important.      How is this prevented?     •Work on your balance and strength. This can help you avoid falls.      •Wear a seat belt when you are in a moving vehicle.    •Wear a helmet when you:  •Ride a bicycle.      •Ski.      •Do any other sport or activity that has a risk of injury.      •If you drink alcohol:•Limit how much you use to:  •0–1 drink a day for nonpregnant women.      •0–2 drinks a day for men.        •Be aware of how much alcohol is in your drink. In the [place] one drink equals one 12 oz bottle of beer (355 mL), one 5 oz glass of wine (148 mL), or one 1½ oz glass of hard liquor (44 mL).      •Make your home safer by:  •Getting rid of clutter from the floors and stairs. This includes things that can make you trip.      •Using grab bars in bathrooms and handrails by stairs.      •Placing non-slip mats on floors and in bathtubs.      •Putting more light in dim areas.          Where to find more information    •Centers for Disease Control and Prevention: www.cdc.gov        Get help right away if:  •You have:  •A very bad headache that is not helped by medicine.      •Trouble walking or weakness in your arms and legs.      •Clear or bloody fluid coming from your nose or ears.      •Changes in how you see (vision).      •A seizure.      •More confusion or more grumpy moods.        •Your symptoms get worse.      •You are sleepier than normal and have trouble staying awake.      •You lose your balance.      •The black centers of your eyes (pupils) change in size.      •Your speech is slurred.      •Your dizziness gets worse.      •You vomit.      These symptoms may be an emergency. Do not wait to see if the symptoms will go away. Get medical help right away. Call your local emergency services (911 in the [place] Do not drive yourself to the hospital.       Summary    •Head injuries can be as minor as a small bump. Some head injuries can be worse.      •Treatment for this condition depends on how severe the injury is and the type of injury you have.      •Have someone stay with you for 24 hours after your head injury.      •Ask your doctor when it is safe for you to go back to your normal activities, such as work or school.      •To prevent a head injury, wear a seat belt in a car, wear a helmet when you use a bicycle, limit your alcohol use, and make your home safer.      This information is not intended to replace advice given to you by your health care provider. Make sure you discuss any questions you have with your health care provider.

## 2022-10-07 NOTE — ED ADULT NURSE NOTE - OBJECTIVE STATEMENT
pt ambulatory from triage sent from urgent care for head CT. pt s/p trip & fall on step hitting head on glass door. area of localized edema noted to left upper forehead with laceration that was repaired at urgent care prior to arrival. neuro intact denies headache/dizziness/visual changes.

## 2022-10-07 NOTE — ED PROVIDER NOTE - NSICDXFAMILYHX_GEN_ALL_CORE_FT
FAMILY HISTORY:  Family history of breast cancer in mother  Family history of colon cancer in mother  Family history of early CAD  Family history of heart attack

## 2022-10-07 NOTE — ED PROVIDER NOTE - OBJECTIVE STATEMENT
81 F accompanied by son, referred from urgent care for CT of head s/p trip and fall. Pt states she didn't see a step and fell forward, striking forehead on a glass door when she fell. No LOC. On ASA. Also has bruise to left hand by karl. Denies HA, dizziness, vision changes, neck pain, back pain, focal numbness/weakness. Lac to forehead repaired by urgent care with dermabond and steristrips.

## 2022-10-07 NOTE — ED PROVIDER NOTE - CLINICAL SUMMARY MEDICAL DECISION MAKING FREE TEXT BOX
81 F accompanied by son, referred from urgent care for CT of head s/p trip and fall. Pt states she didn't see a step and fell forward, striking forehead on a glass door when she fell. No LOC. On ASA. Also has bruise to left hand by pinky. Denies HA, dizziness, vision changes, neck pain, back pain, focal numbness/weakness. Lac to forehead repaired by urgent care with dermabond and steristrips.  pt well appearing, neuro intact, no neck or back tenderness, will get ct head

## 2022-10-07 NOTE — ED ADULT NURSE NOTE - AREA
You were seen for hand pain.    Follow up with Dr. Sy this week.     Keep splint clean and dry.     Seek immediate medical assistance for any new or worsening symptoms such as worsening pain, numbness, tingling, nausea vomiting or fever.     Schedule an outpatient MRI for your left hand (carpal joint)    Take Alleve two times per day. You were seen for hand pain.    Follow up with Dr. Sy this week.     Keep splint clean and dry.     Seek immediate medical assistance for any new or worsening symptoms such as worsening pain, numbness, tingling, nausea vomiting or fever.     Schedule an outpatient MRI for your left hand (carpal joint)    Take Aleve two times per day. upper

## 2022-10-07 NOTE — ED ADULT NURSE NOTE - NSIMPLEMENTINTERV_GEN_ALL_ED
Implemented All Fall with Harm Risk Interventions:  [place] to call system. Call bell, personal items and telephone within reach. Instruct patient to call for assistance. Room bathroom lighting operational. Non-slip footwear when patient is off stretcher. Physically safe environment: no spills, clutter or unnecessary equipment. Stretcher in lowest position, wheels locked, appropriate side rails in place. Provide visual cue, wrist band, yellow gown, etc. Monitor gait and stability. Monitor for mental status changes and reorient to person, place, and time. Review medications for side effects contributing to fall risk. Reinforce activity limits and safety measures with patient and family. Provide visual clues: red socks.

## 2022-11-04 ENCOUNTER — APPOINTMENT (OUTPATIENT)
Dept: ELECTROPHYSIOLOGY | Facility: CLINIC | Age: 82
End: 2022-11-04

## 2022-11-04 ENCOUNTER — NON-APPOINTMENT (OUTPATIENT)
Age: 82
End: 2022-11-04

## 2022-11-04 PROCEDURE — G2066: CPT

## 2022-11-04 PROCEDURE — 93298 REM INTERROG DEV EVAL SCRMS: CPT

## 2022-12-09 ENCOUNTER — NON-APPOINTMENT (OUTPATIENT)
Age: 82
End: 2022-12-09

## 2022-12-09 ENCOUNTER — APPOINTMENT (OUTPATIENT)
Dept: ELECTROPHYSIOLOGY | Facility: CLINIC | Age: 82
End: 2022-12-09

## 2022-12-09 PROCEDURE — 93298 REM INTERROG DEV EVAL SCRMS: CPT

## 2022-12-09 PROCEDURE — G2066: CPT

## 2023-01-13 ENCOUNTER — APPOINTMENT (OUTPATIENT)
Dept: ELECTROPHYSIOLOGY | Facility: CLINIC | Age: 83
End: 2023-01-13
Payer: MEDICARE

## 2023-01-13 ENCOUNTER — NON-APPOINTMENT (OUTPATIENT)
Age: 83
End: 2023-01-13

## 2023-01-13 PROCEDURE — G2066: CPT

## 2023-01-13 PROCEDURE — 93298 REM INTERROG DEV EVAL SCRMS: CPT

## 2023-02-17 ENCOUNTER — NON-APPOINTMENT (OUTPATIENT)
Age: 83
End: 2023-02-17

## 2023-02-17 ENCOUNTER — APPOINTMENT (OUTPATIENT)
Dept: ELECTROPHYSIOLOGY | Facility: CLINIC | Age: 83
End: 2023-02-17
Payer: MEDICARE

## 2023-02-17 PROCEDURE — G2066: CPT

## 2023-02-17 PROCEDURE — 93298 REM INTERROG DEV EVAL SCRMS: CPT

## 2023-03-24 ENCOUNTER — NON-APPOINTMENT (OUTPATIENT)
Age: 83
End: 2023-03-24

## 2023-03-24 ENCOUNTER — APPOINTMENT (OUTPATIENT)
Dept: ELECTROPHYSIOLOGY | Facility: CLINIC | Age: 83
End: 2023-03-24
Payer: MEDICARE

## 2023-03-24 PROCEDURE — 93298 REM INTERROG DEV EVAL SCRMS: CPT

## 2023-03-24 PROCEDURE — G2066: CPT

## 2023-04-26 ENCOUNTER — APPOINTMENT (OUTPATIENT)
Dept: ELECTROPHYSIOLOGY | Facility: CLINIC | Age: 83
End: 2023-04-26
Payer: MEDICARE

## 2023-04-26 ENCOUNTER — NON-APPOINTMENT (OUTPATIENT)
Age: 83
End: 2023-04-26

## 2023-04-26 PROCEDURE — 93298 REM INTERROG DEV EVAL SCRMS: CPT

## 2023-04-26 PROCEDURE — G2066: CPT

## 2023-05-11 NOTE — PHYSICAL THERAPY INITIAL EVALUATION ADULT - BED MOBILITY LIMITATIONS, REHAB EVAL
Yes decreased ability to use arms for pushing/pulling/decreased ability to use legs for bridging/pushing

## 2023-06-01 ENCOUNTER — APPOINTMENT (OUTPATIENT)
Dept: ELECTROPHYSIOLOGY | Facility: CLINIC | Age: 83
End: 2023-06-01
Payer: MEDICARE

## 2023-06-01 ENCOUNTER — NON-APPOINTMENT (OUTPATIENT)
Age: 83
End: 2023-06-01

## 2023-06-01 PROCEDURE — G2066: CPT

## 2023-06-01 PROCEDURE — 93298 REM INTERROG DEV EVAL SCRMS: CPT

## 2023-06-16 ENCOUNTER — APPOINTMENT (OUTPATIENT)
Dept: ELECTROPHYSIOLOGY | Facility: CLINIC | Age: 83
End: 2023-06-16
Payer: MEDICARE

## 2023-06-16 DIAGNOSIS — I47.1 SUPRAVENTRICULAR TACHYCARDIA: ICD-10-CM

## 2023-06-16 DIAGNOSIS — I48.0 PAROXYSMAL ATRIAL FIBRILLATION: ICD-10-CM

## 2023-06-16 PROCEDURE — 93285 PRGRMG DEV EVAL SCRMS IP: CPT

## 2023-06-16 RX ORDER — FLUOXETINE HYDROCHLORIDE 10 MG/1
10 TABLET ORAL
Refills: 0 | Status: ACTIVE | COMMUNITY

## 2023-07-21 ENCOUNTER — APPOINTMENT (OUTPATIENT)
Dept: ELECTROPHYSIOLOGY | Facility: CLINIC | Age: 83
End: 2023-07-21
Payer: MEDICARE

## 2023-07-21 ENCOUNTER — NON-APPOINTMENT (OUTPATIENT)
Age: 83
End: 2023-07-21

## 2023-07-21 PROCEDURE — G2066: CPT

## 2023-07-21 PROCEDURE — 93298 REM INTERROG DEV EVAL SCRMS: CPT

## 2023-07-28 ENCOUNTER — NON-APPOINTMENT (OUTPATIENT)
Age: 83
End: 2023-07-28

## 2023-08-23 ENCOUNTER — OUTPATIENT (OUTPATIENT)
Dept: OUTPATIENT SERVICES | Facility: HOSPITAL | Age: 83
LOS: 1 days | Discharge: HOME | End: 2023-08-23
Payer: MEDICARE

## 2023-08-23 DIAGNOSIS — Z95.818 PRESENCE OF OTHER CARDIAC IMPLANTS AND GRAFTS: Chronic | ICD-10-CM

## 2023-08-23 DIAGNOSIS — I48.0 PAROXYSMAL ATRIAL FIBRILLATION: ICD-10-CM

## 2023-08-23 DIAGNOSIS — C44.91 BASAL CELL CARCINOMA OF SKIN, UNSPECIFIED: Chronic | ICD-10-CM

## 2023-08-23 PROCEDURE — 33286 RMVL SUBQ CAR RHYTHM MNTR: CPT | Mod: 59

## 2023-08-23 PROCEDURE — 33285 INSJ SUBQ CAR RHYTHM MNTR: CPT

## 2023-08-23 RX ORDER — ALENDRONATE SODIUM 70 MG/1
1 TABLET ORAL
Refills: 0 | DISCHARGE

## 2023-08-23 RX ORDER — FLUOXETINE HCL 10 MG
1 CAPSULE ORAL
Refills: 0 | DISCHARGE

## 2023-08-23 RX ORDER — METOPROLOL TARTRATE 50 MG
1 TABLET ORAL
Refills: 0 | DISCHARGE

## 2023-08-23 RX ORDER — ASPIRIN/CALCIUM CARB/MAGNESIUM 324 MG
1 TABLET ORAL
Qty: 0 | Refills: 0 | DISCHARGE

## 2023-08-23 RX ORDER — METOPROLOL TARTRATE 50 MG
1 TABLET ORAL
Qty: 0 | Refills: 0 | DISCHARGE

## 2023-08-23 NOTE — H&P CARDIOLOGY - NSICDXPASTMEDICALHX_GEN_ALL_CORE_FT
PAST MEDICAL HISTORY:  A-fib 2007 coverted 1/2008    Aortic stenosis     BCC (basal cell carcinoma of skin)     Constipation, unspecified constipation type     Fibroid     [place] (hard of hearing)     HTN (hypertension), benign     Melanoma     Mitral valve disease     Ovarian cyst     Rheumatic fever at 11 years of age    Rheumatic fever childhood

## 2023-08-23 NOTE — H&P CARDIOLOGY - NS ATTEND AMEND GEN_ALL_CORE FT
Patient here for loop replacement.  Hx of AF and ablation.  Need to make sure she remains in sinus rhythm.

## 2023-08-23 NOTE — CHART NOTE - NSCHARTNOTEFT_GEN_A_CORE
ELECTROPHYSIOLOGY      Patient s/p explant and reimplant of an ILR.   Tolerated the procedure well. No complications.   Vital signs stable.   Device site without bleeding, ecchymosis or swelling.  Instructed to remove dressing in 24 hours.   Post procedure ILR teaching done.   Written instructions and contact information provided.   Patient given a home monitor with written and verbal instruction.   All questions answered.   As per her request, patient will receive a follow-up phone call from a device clinic RN on 9/8/23 at 11:00am.

## 2023-08-23 NOTE — H&P CARDIOLOGY - HISTORY OF PRESENT ILLNESS
83 y/o F w/ PMH w/ PMH of hypertension, rheumatic heart disease, aortic stenosis, MVR x2 (2007/2017) with post op Afib and AT s/p  DCCV(3/15/2019), PVI and AT ablation(4/11/2019) and repeat AT ablation(11/2019) + ILR implantation presents for ILR explantationan and implantation. 83 y/o F w/ PMH w/ PMH of hypertension, rheumatic heart disease, aortic stenosis, MVR x2 (2007/2017) with post op Afib and AT s/p  DCCV(3/15/2019), PVI and AT ablation(4/11/2019) and repeat AT ablation(11/2019) + ILR implantation presents for ILR explantation and implantation. Pt's device has reached EOL and she will require further monitoring for reoccurrence of Afib.

## 2023-08-23 NOTE — H&P CARDIOLOGY - NSICDXPASTSURGICALHX_GEN_ALL_CORE_FT
PAST SURGICAL HISTORY:  BCC (basal cell carcinoma) removed from right neck    Implantable loop recorder present     S/P cardiac catheterization 2007    S/P hysterectomy with oophorectomy     S/P MVR (mitral valve replacement) 2007 - bovine, 2017 bovine    S/P tonsillectomy

## 2023-08-25 ENCOUNTER — APPOINTMENT (OUTPATIENT)
Dept: ELECTROPHYSIOLOGY | Facility: CLINIC | Age: 83
End: 2023-08-25

## 2023-09-08 ENCOUNTER — APPOINTMENT (OUTPATIENT)
Dept: ELECTROPHYSIOLOGY | Facility: CLINIC | Age: 83
End: 2023-09-08

## 2023-10-06 ENCOUNTER — APPOINTMENT (OUTPATIENT)
Dept: ELECTROPHYSIOLOGY | Facility: CLINIC | Age: 83
End: 2023-10-06
Payer: MEDICARE

## 2023-10-06 ENCOUNTER — NON-APPOINTMENT (OUTPATIENT)
Age: 83
End: 2023-10-06

## 2023-10-06 PROCEDURE — 93298 REM INTERROG DEV EVAL SCRMS: CPT

## 2023-10-06 PROCEDURE — G2066: CPT

## 2023-10-07 ENCOUNTER — NON-APPOINTMENT (OUTPATIENT)
Age: 83
End: 2023-10-07

## 2023-10-10 NOTE — PROGRESS NOTE ADULT - PROBLEM SELECTOR PLAN 3
normal sinus rhythm currently, was in rapid a-fib earlier  follow up EKG   continue amio and lopressor   Continue coumadin check INR in AM.  patient education Yes - the patient is able to be screened

## 2023-11-10 ENCOUNTER — APPOINTMENT (OUTPATIENT)
Dept: ELECTROPHYSIOLOGY | Facility: CLINIC | Age: 83
End: 2023-11-10
Payer: MEDICARE

## 2023-11-10 ENCOUNTER — NON-APPOINTMENT (OUTPATIENT)
Age: 83
End: 2023-11-10

## 2023-11-11 PROCEDURE — G2066: CPT | Mod: NC

## 2023-11-11 PROCEDURE — 93298 REM INTERROG DEV EVAL SCRMS: CPT | Mod: NC

## 2023-12-15 ENCOUNTER — NON-APPOINTMENT (OUTPATIENT)
Age: 83
End: 2023-12-15

## 2023-12-15 ENCOUNTER — APPOINTMENT (OUTPATIENT)
Dept: ELECTROPHYSIOLOGY | Facility: CLINIC | Age: 83
End: 2023-12-15
Payer: MEDICARE

## 2023-12-16 PROCEDURE — G2066: CPT

## 2023-12-16 PROCEDURE — 93298 REM INTERROG DEV EVAL SCRMS: CPT

## 2023-12-18 NOTE — PROGRESS NOTE ADULT - PROBLEM/PLAN-7
Last CPX: 01/06/20  BP Readings from Last 2 Encounters:   10/05/23 (!) 144/86   09/15/23 (!) 170/90     Follow up appointment: Visit date not found   Courtesy refill contacted patient to schedule appointment for future refills .  Name from pharmacy: METOPROLOL ER SUCCINATE 100MG TABS          Will file in chart as: metoPROLOL succinate (TOPROL-XL) 100 MG 24 hr tablet    Sig: Take 1 tablet by mouth daily.    Original sig: TAKE 1 TABLET BY MOUTH DAILY    Disp: 90 tablet    Refills: 3 (Pharmacy requested: Not specified)    Start: 12/16/2023    Class: Eprescribe    Non-formulary For: Benign essential HTN    Last ordered: 1 year ago (9/20/2022) by Mavis Mcneill MD    Last refill: 9/19/2023    Rx #: [id number]    Beta Blocker Refill Protocol - 12 Month Protocol [id number]/16/2023 04:07 AM   Protocol Details Last BP was under 140/90 or if patient has diabetes, CAD, or PVD, BP under 130/80 in past year -- IF CRITERIA FAILED REFER TO PROTOCOL DETAILS    eGFR within last 12 months looking at last value    Seen by prescribing provider or same department within the last 12 months or has a future appt in 3 months - IF FAILED PLEASE LOOK AT CHART REVIEW FOR LAST VISIT AND PROCEED ACCORDINGLY    Last recorded pulse is greater than 49    Medication (including dose and sig) on current meds list    Request is NOT for Sotalol HCL      To be filled at: [id number] [place], [place] - [phone] AT [facility] OF [address]      DISPLAY PLAN FREE TEXT

## 2024-01-23 ENCOUNTER — APPOINTMENT (OUTPATIENT)
Dept: ELECTROPHYSIOLOGY | Facility: CLINIC | Age: 84
End: 2024-01-23
Payer: MEDICARE

## 2024-01-23 ENCOUNTER — NON-APPOINTMENT (OUTPATIENT)
Age: 84
End: 2024-01-23

## 2024-01-24 PROCEDURE — 93298 REM INTERROG DEV EVAL SCRMS: CPT

## 2024-02-27 ENCOUNTER — APPOINTMENT (OUTPATIENT)
Dept: ELECTROPHYSIOLOGY | Facility: CLINIC | Age: 84
End: 2024-02-27
Payer: MEDICARE

## 2024-02-27 ENCOUNTER — NON-APPOINTMENT (OUTPATIENT)
Age: 84
End: 2024-02-27

## 2024-02-27 PROCEDURE — 93298 REM INTERROG DEV EVAL SCRMS: CPT

## 2024-04-01 ENCOUNTER — NON-APPOINTMENT (OUTPATIENT)
Age: 84
End: 2024-04-01

## 2024-04-01 ENCOUNTER — APPOINTMENT (OUTPATIENT)
Dept: ELECTROPHYSIOLOGY | Facility: CLINIC | Age: 84
End: 2024-04-01
Payer: MEDICARE

## 2024-04-01 PROCEDURE — 93298 REM INTERROG DEV EVAL SCRMS: CPT

## 2024-04-24 NOTE — PATIENT PROFILE ADULT - FAMILY NEEDS
Pt would like another referral to Pain Management. Pts wife, Maria Guadalupe, called today. She stated the person they had at [facility] was rude to pt and more or less told him it was all in his head. She stated to pt that his PCP was wrong and then ordered a lot more testing. Pt no longer feels comfortable going there. Pts wife stated office could leave a detailed message on VM if she doesn't answer phone. Please advise    no

## 2024-05-06 ENCOUNTER — APPOINTMENT (OUTPATIENT)
Dept: ELECTROPHYSIOLOGY | Facility: CLINIC | Age: 84
End: 2024-05-06
Payer: MEDICARE

## 2024-05-06 ENCOUNTER — NON-APPOINTMENT (OUTPATIENT)
Age: 84
End: 2024-05-06

## 2024-05-06 PROCEDURE — 93298 REM INTERROG DEV EVAL SCRMS: CPT

## 2024-06-10 ENCOUNTER — NON-APPOINTMENT (OUTPATIENT)
Age: 84
End: 2024-06-10

## 2024-06-10 ENCOUNTER — APPOINTMENT (OUTPATIENT)
Dept: ELECTROPHYSIOLOGY | Facility: CLINIC | Age: 84
End: 2024-06-10
Payer: MEDICARE

## 2024-06-10 PROCEDURE — 93298 REM INTERROG DEV EVAL SCRMS: CPT

## 2024-07-12 ENCOUNTER — APPOINTMENT (OUTPATIENT)
Dept: ELECTROPHYSIOLOGY | Facility: CLINIC | Age: 84
End: 2024-07-12
Payer: MEDICARE

## 2024-07-12 ENCOUNTER — NON-APPOINTMENT (OUTPATIENT)
Age: 84
End: 2024-07-12

## 2024-07-12 PROCEDURE — 93298 REM INTERROG DEV EVAL SCRMS: CPT

## 2024-08-16 ENCOUNTER — APPOINTMENT (OUTPATIENT)
Dept: ELECTROPHYSIOLOGY | Facility: CLINIC | Age: 84
End: 2024-08-16
Payer: MEDICARE

## 2024-08-16 ENCOUNTER — NON-APPOINTMENT (OUTPATIENT)
Age: 84
End: 2024-08-16

## 2024-08-16 PROCEDURE — 93298 REM INTERROG DEV EVAL SCRMS: CPT

## 2024-09-11 NOTE — ED ADULT NURSE NOTE - NSFALLRSKHARMRISK_ED_ALL_ED
Urine calcium is very slkightly elevated. Please verify if taking any supplem,ents prior to urine test
no

## 2024-09-19 ENCOUNTER — APPOINTMENT (OUTPATIENT)
Dept: ELECTROPHYSIOLOGY | Facility: CLINIC | Age: 84
End: 2024-09-19
Payer: MEDICARE

## 2024-09-19 ENCOUNTER — NON-APPOINTMENT (OUTPATIENT)
Age: 84
End: 2024-09-19

## 2024-09-19 PROCEDURE — 93298 REM INTERROG DEV EVAL SCRMS: CPT

## 2024-10-24 ENCOUNTER — APPOINTMENT (OUTPATIENT)
Dept: ELECTROPHYSIOLOGY | Facility: CLINIC | Age: 84
End: 2024-10-24
Payer: MEDICARE

## 2024-10-24 ENCOUNTER — NON-APPOINTMENT (OUTPATIENT)
Age: 84
End: 2024-10-24

## 2024-10-24 PROCEDURE — 93298 REM INTERROG DEV EVAL SCRMS: CPT

## 2024-12-06 ENCOUNTER — APPOINTMENT (OUTPATIENT)
Dept: ELECTROPHYSIOLOGY | Facility: CLINIC | Age: 84
End: 2024-12-06
Payer: MEDICARE

## 2024-12-06 ENCOUNTER — NON-APPOINTMENT (OUTPATIENT)
Age: 84
End: 2024-12-06

## 2024-12-06 VITALS
DIASTOLIC BLOOD PRESSURE: 63 MMHG | WEIGHT: 118 LBS | BODY MASS INDEX: 20.14 KG/M2 | OXYGEN SATURATION: 99 % | SYSTOLIC BLOOD PRESSURE: 148 MMHG | HEIGHT: 64 IN | HEART RATE: 64 BPM | RESPIRATION RATE: 16 BRPM | TEMPERATURE: 98.4 F

## 2024-12-06 DIAGNOSIS — I10 ESSENTIAL (PRIMARY) HYPERTENSION: ICD-10-CM

## 2024-12-06 DIAGNOSIS — I47.19 OTHER SUPRAVENTRICULAR TACHYCARDIA: ICD-10-CM

## 2024-12-06 DIAGNOSIS — I48.0 PAROXYSMAL ATRIAL FIBRILLATION: ICD-10-CM

## 2024-12-06 PROCEDURE — 99213 OFFICE O/P EST LOW 20 MIN: CPT | Mod: 25

## 2024-12-06 PROCEDURE — 93000 ELECTROCARDIOGRAM COMPLETE: CPT

## 2025-01-10 ENCOUNTER — NON-APPOINTMENT (OUTPATIENT)
Age: 85
End: 2025-01-10

## 2025-01-10 ENCOUNTER — APPOINTMENT (OUTPATIENT)
Dept: ELECTROPHYSIOLOGY | Facility: CLINIC | Age: 85
End: 2025-01-10
Payer: MEDICARE

## 2025-01-10 PROCEDURE — 93298 REM INTERROG DEV EVAL SCRMS: CPT

## 2025-01-20 NOTE — PHYSICAL THERAPY INITIAL EVALUATION ADULT - LEVEL OF INDEPENDENCE: SUPINE/SIT, REHAB EVAL
Refilled as requested. Scheduled cpx for 04/15/2025.   
moderate assist (50% patients effort)/minimum assist (75% patients effort)

## 2025-02-14 ENCOUNTER — APPOINTMENT (OUTPATIENT)
Dept: ELECTROPHYSIOLOGY | Facility: CLINIC | Age: 85
End: 2025-02-14

## 2025-02-14 PROCEDURE — 93298 REM INTERROG DEV EVAL SCRMS: CPT

## 2025-03-21 ENCOUNTER — APPOINTMENT (OUTPATIENT)
Dept: ELECTROPHYSIOLOGY | Facility: CLINIC | Age: 85
End: 2025-03-21
Payer: MEDICARE

## 2025-03-21 ENCOUNTER — NON-APPOINTMENT (OUTPATIENT)
Age: 85
End: 2025-03-21

## 2025-03-21 PROCEDURE — 93298 REM INTERROG DEV EVAL SCRMS: CPT

## 2025-04-25 ENCOUNTER — APPOINTMENT (OUTPATIENT)
Dept: ELECTROPHYSIOLOGY | Facility: CLINIC | Age: 85
End: 2025-04-25

## 2025-04-25 ENCOUNTER — NON-APPOINTMENT (OUTPATIENT)
Age: 85
End: 2025-04-25

## 2025-04-25 PROCEDURE — 93298 REM INTERROG DEV EVAL SCRMS: CPT

## 2025-05-29 ENCOUNTER — NON-APPOINTMENT (OUTPATIENT)
Age: 85
End: 2025-05-29

## 2025-05-29 ENCOUNTER — APPOINTMENT (OUTPATIENT)
Dept: ELECTROPHYSIOLOGY | Facility: CLINIC | Age: 85
End: 2025-05-29
Payer: MEDICARE

## 2025-05-29 PROCEDURE — 93298 REM INTERROG DEV EVAL SCRMS: CPT

## 2025-07-02 ENCOUNTER — NON-APPOINTMENT (OUTPATIENT)
Age: 85
End: 2025-07-02

## 2025-07-02 ENCOUNTER — APPOINTMENT (OUTPATIENT)
Dept: ELECTROPHYSIOLOGY | Facility: CLINIC | Age: 85
End: 2025-07-02
Payer: MEDICARE

## 2025-07-02 PROCEDURE — 93298 REM INTERROG DEV EVAL SCRMS: CPT

## 2025-08-05 ENCOUNTER — NON-APPOINTMENT (OUTPATIENT)
Age: 85
End: 2025-08-05

## 2025-08-05 ENCOUNTER — APPOINTMENT (OUTPATIENT)
Dept: ELECTROPHYSIOLOGY | Facility: CLINIC | Age: 85
End: 2025-08-05
Payer: MEDICARE

## 2025-08-05 PROCEDURE — 93298 REM INTERROG DEV EVAL SCRMS: CPT

## 2025-09-09 ENCOUNTER — APPOINTMENT (OUTPATIENT)
Dept: ELECTROPHYSIOLOGY | Facility: CLINIC | Age: 85
End: 2025-09-09
Payer: MEDICARE

## 2025-09-09 ENCOUNTER — NON-APPOINTMENT (OUTPATIENT)
Age: 85
End: 2025-09-09

## 2025-09-09 PROCEDURE — 93298 REM INTERROG DEV EVAL SCRMS: CPT
